# Patient Record
Sex: MALE | Race: WHITE | Employment: FULL TIME | URBAN - METROPOLITAN AREA
[De-identification: names, ages, dates, MRNs, and addresses within clinical notes are randomized per-mention and may not be internally consistent; named-entity substitution may affect disease eponyms.]

---

## 2022-04-14 ENCOUNTER — TELEPHONE (OUTPATIENT)
Dept: CARDIOLOGY | Age: 67
End: 2022-04-14

## 2022-04-26 ENCOUNTER — TELEPHONE (OUTPATIENT)
Dept: CARDIOLOGY | Age: 67
End: 2022-04-26

## 2022-04-26 NOTE — TELEPHONE ENCOUNTER
Spoke with patient and confirmed nuclear stress test appointment on April 28, 2022 at 0800. Instructions for test and COVID-19 preprocedure checklist reviewed with patient, questions answered.

## 2022-04-28 ENCOUNTER — HOSPITAL ENCOUNTER (OUTPATIENT)
Dept: CARDIOLOGY | Age: 67
Discharge: HOME | End: 2022-04-28
Payer: MEDICARE

## 2022-04-28 VITALS
DIASTOLIC BLOOD PRESSURE: 88 MMHG | RESPIRATION RATE: 12 BRPM | HEIGHT: 70 IN | HEART RATE: 74 BPM | SYSTOLIC BLOOD PRESSURE: 148 MMHG | WEIGHT: 200 LBS | BODY MASS INDEX: 28.63 KG/M2

## 2022-04-28 DIAGNOSIS — R06.02 SHORTNESS OF BREATH: Primary | ICD-10-CM

## 2022-04-28 PROCEDURE — 93017 CV STRESS TEST TRACING ONLY: CPT

## 2022-04-28 PROCEDURE — 2580000003 HC RX 258: Performed by: INTERNAL MEDICINE

## 2022-04-28 PROCEDURE — 3430000000 HC RX DIAGNOSTIC RADIOPHARMACEUTICAL: Performed by: INTERNAL MEDICINE

## 2022-04-28 PROCEDURE — 78452 HT MUSCLE IMAGE SPECT MULT: CPT

## 2022-04-28 PROCEDURE — A9500 TC99M SESTAMIBI: HCPCS | Performed by: INTERNAL MEDICINE

## 2022-04-28 PROCEDURE — 6360000002 HC RX W HCPCS: Performed by: FAMILY MEDICINE

## 2022-04-28 RX ORDER — ALBUTEROL SULFATE 90 UG/1
2 AEROSOL, METERED RESPIRATORY (INHALATION) PRN
COMMUNITY
Start: 2022-03-29

## 2022-04-28 RX ORDER — SODIUM CHLORIDE 0.9 % (FLUSH) 0.9 %
10 SYRINGE (ML) INJECTION PRN
Status: DISCONTINUED | OUTPATIENT
Start: 2022-04-28 | End: 2022-04-29 | Stop reason: HOSPADM

## 2022-04-28 RX ADMIN — SODIUM CHLORIDE, PRESERVATIVE FREE 10 ML: 5 INJECTION INTRAVENOUS at 10:22

## 2022-04-28 RX ADMIN — SODIUM CHLORIDE, PRESERVATIVE FREE 10 ML: 5 INJECTION INTRAVENOUS at 08:12

## 2022-04-28 RX ADMIN — REGADENOSON 0.4 MG: 0.08 INJECTION, SOLUTION INTRAVENOUS at 10:22

## 2022-04-28 RX ADMIN — Medication 10.7 MILLICURIE: at 08:11

## 2022-04-28 RX ADMIN — Medication 31.3 MILLICURIE: at 10:22

## 2022-04-28 RX ADMIN — SODIUM CHLORIDE, PRESERVATIVE FREE 10 ML: 5 INJECTION INTRAVENOUS at 10:23

## 2022-04-28 NOTE — PROCEDURES
[address] [facility] [address] [place] [phone]                Pharmacologic Stress Nuclear Gated SPECT Study    Name: Jannie Ron Account Number: [de-identified]    :  1955          Sex: male         Date of Study:  2022    Height: 5' 10\" (177.8 cm)         Weight: 200 lb (90.7 kg)     Yahaira Arguello MD       PCP: Fransisca Baer MD      Cardiologist: None             Interpreting Physician: Malika Archuleta MD  _________________________________________________________________________________    Indication:   Detecting the presence and location of coronary artery disease    Clinical History:   Patient has no known history of coronary artery disease. Resting ECG:    AZ int *** sec, QRS int *** sec, QT int *** sec; HR 83 bpm  {Resting ECG Findings:95099}    Procedure:   Patient unable to achieve target heart rate on treadmill due to dyspnea. Pharmacologic stress testing was performed with regadenoson 0.4 mg for 15 seconds. The heart rate was 83 at baseline and mihaela to 108 beats during the infusion. This corresponds to 70% of maximum predicted heart rate. The blood pressure at baseline was 146/84 and blood pressure at the end of infusion was 128/76. Blood pressure response was normal during the stress procedure. The patient experienced mild shortness of breath and flushed sensation during the infusion. ECG during the infusion {Blank single:11392::\"developed ST segment changes consistent with ischemia\",\"did not change\"}. IMAGING: Myocardial perfusion imaging was performed at rest 30-35 minutes following the intravenous injection of 10.7 mCi of (Tc-Sestamibi) followed by 10 ml of Normal Saline. As per infusion protocol, the patient was injected intravenously with 31.3 mCi of (Tc-Sestamibi) followed by 10 ml of Normal Saline.   Gated post-stress tomographic imaging was performed 20-25 minutes after stress. FINDINGS: The overall quality of the study was {RATIN}. Left ventricular cavity size was noted to be {Blank single:69090::\"enlarged on rest study only\",\"enlarged on stress study only\",\"enlarged on both rest and stress studies\",\"normal\"}. Rotational analog analysis demonstrated {Nuclear Stress Test Findings:69001::\"no patient motion or abnormal extracardiac radioactivity\"}. The gated SPECT stress imaging in the short, vertical long, and horizontal long axis demonstrated normal homogeneous tracer distribution throughout the myocardium. IF TEST NORMAL-HIGHLIGHT AND DELETE FOLLOWING SECTIONS:    A {MILD, MOD, SEV:10135} defect was present in the {Nuclear Stress Test Defect Segments:62929} wall(s) that was  {Blank single:65431::\"small\",\"moderate\",\"large\"} sized by quantification. There also was a {MILD, MOD, SEV:17731} defect present in the {Nuclear Stress Test Defect Segments:25457} wall(s) that was  {Blank single:25611::\"small\",\"moderate\",\"large\"} sized by quantification:     The resting images {Blank single:53065::\"are normal\",\"reveal partial reversibility\",\"reveal complete reversibility\",\"show no change\"}. Gated SPECT left ventricular ejection fraction was calculated to be ***%, with {Myocardial Wall Motion:10445::\"normal myocardial thickening and wall motion\"}. Impression:    1. ECG during the infusion {Blank single:84528::\"developed ST segment changes consistent with ischemia\",\"did not change\"}. 2. The myocardial perfusion imaging was {Myocardial perfusion imagin}. IF TEST NORMAL-HIGHLIGHT AND DELETE FOLLOWING SECTIONS:  The abnormality was a {Myocardial imaging defects:74340}    3. Overall left ventricular systolic function was {Blank single:45187::\"abnormal with regional wall motion abnormalities\",\"abnormal without regional wall motion abnormalities\",\"normal without regional wall motion abnormalities\"}.   4. {Blank single:77239::\"High\",\"Intermediate\",\"Low\"} risk {Blank single:82894::\"pre-operative\",\"post MI\",\"general\"} pharmacologic stress test.    Thank you for sending your patient to this [facility].      {Esignature:[id number]}

## 2022-04-28 NOTE — PROCEDURES
[address] [facility] [address] [phone]                Pharmacologic Stress Nuclear Gated SPECT Study    Name: Jannie Ron Account Number: [de-identified]    :  1955          Sex: male         Date of Study:  2022    Height: 5' 10\" (177.8 cm)         Weight: 200 lb (90.7 kg)     Juan Kuhn MD       PCP: Edu Napier MD      Cardiologist: None             Interpreting Physician: Alley Hyman MD  _________________________________________________________________________________    Indication:   Detecting the presence and location of coronary artery disease    Clinical History:   Patient has no known history of coronary artery disease. Procedure:   Patient unable to achieve target heart rate on treadmill due to dyspnea. Pharmacologic stress testing was performed with regadenoson 0.4 mg for 15 seconds. The heart rate was 83 at baseline and mihaela to 108 beats during the infusion. This corresponds to 70% of maximum predicted heart rate. The blood pressure at baseline was 146/84 and blood pressure at the end of infusion was 128/76. Blood pressure response was normal during the stress procedure. The patient experienced mild shortness of breath and flushed sensation during the infusion. ECG during the infusion did not change. IMAGING: Myocardial perfusion imaging was performed at rest 30-35 minutes following the intravenous injection of 10.7 mCi of (Tc-Sestamibi) followed by 10 ml of Normal Saline. As per infusion protocol, the patient was injected intravenously with 31.3 mCi of (Tc-Sestamibi) followed by 10 ml of Normal Saline. Gated post-stress tomographic imaging was performed 20-25 minutes after stress. FINDINGS: The overall quality of the study was fair.      Left ventricular cavity size was noted to be normal.    Rotational analog analysis demonstrated soft tissue diaphragmatic attenuation and bowel uptake. The gated SPECT stress imaging in the short, vertical long, and horizontal long axis demonstrated normal homogeneous tracer distribution throughout the myocardium, apart from stated artifact. The resting images show no change. Gated SPECT left ventricular ejection fraction was calculated to be 63%, with normal myocardial thickening and wall motion. Impression:    1. ECG during the infusion did not change. 2. The myocardial perfusion imaging was normal with attenuation artifact. 3. Overall left ventricular systolic function was normal without regional wall motion abnormalities. 4. Low risk general pharmacologic nuclear  stress test.    Thank you for sending your patient to this [facility].      Electronically signed by Wil Rainey MD on 4/28/22 at 11:44 AM EDT

## 2022-05-20 ENCOUNTER — HOSPITAL ENCOUNTER (OUTPATIENT)
Dept: GENERAL RADIOLOGY | Age: 67
Discharge: HOME | End: 2022-05-22
Payer: MEDICARE

## 2022-05-20 ENCOUNTER — HOSPITAL ENCOUNTER (OUTPATIENT)
Age: 67
Discharge: HOME | End: 2022-05-22
Payer: MEDICARE

## 2022-05-20 DIAGNOSIS — R06.00 DYSPNEA, UNSPECIFIED TYPE: ICD-10-CM

## 2022-05-20 DIAGNOSIS — R06.02 SHORTNESS OF BREATH: ICD-10-CM

## 2022-05-20 PROCEDURE — 71046 X-RAY EXAM CHEST 2 VIEWS: CPT

## 2022-06-13 ENCOUNTER — HOSPITAL ENCOUNTER (OUTPATIENT)
Dept: HOSPITAL 83 - RESCLI | Age: 67
Discharge: HOME | End: 2022-06-13
Attending: STUDENT IN AN ORGANIZED HEALTH CARE EDUCATION/TRAINING PROGRAM
Payer: MEDICARE

## 2022-06-13 DIAGNOSIS — Z79.899: ICD-10-CM

## 2022-06-13 DIAGNOSIS — J44.9: Primary | ICD-10-CM

## 2023-04-05 ENCOUNTER — HOSPITAL ENCOUNTER (OUTPATIENT)
Dept: HOSPITAL 83 - RESCLI | Age: 68
Discharge: HOME | End: 2023-04-05
Attending: STUDENT IN AN ORGANIZED HEALTH CARE EDUCATION/TRAINING PROGRAM
Payer: MEDICARE

## 2023-04-05 DIAGNOSIS — J44.9: ICD-10-CM

## 2023-04-05 DIAGNOSIS — I10: Primary | ICD-10-CM

## 2023-04-05 DIAGNOSIS — Z87.891: ICD-10-CM

## 2023-04-05 DIAGNOSIS — F10.90: ICD-10-CM

## 2023-04-05 DIAGNOSIS — Z98.890: ICD-10-CM

## 2023-04-05 DIAGNOSIS — Z79.899: ICD-10-CM

## 2024-06-07 ENCOUNTER — HOSPITAL ENCOUNTER (OUTPATIENT)
Dept: HOSPITAL 83 - RESCLI | Age: 69
Discharge: HOME | End: 2024-06-07
Attending: INTERNAL MEDICINE
Payer: MEDICARE

## 2024-06-07 DIAGNOSIS — Z98.890: ICD-10-CM

## 2024-06-07 DIAGNOSIS — Z88.8: ICD-10-CM

## 2024-06-07 DIAGNOSIS — Z79.899: ICD-10-CM

## 2024-06-07 DIAGNOSIS — I10: Primary | ICD-10-CM

## 2024-06-07 DIAGNOSIS — J44.9: ICD-10-CM

## 2025-01-31 ENCOUNTER — APPOINTMENT (OUTPATIENT)
Dept: GENERAL RADIOLOGY | Age: 70
DRG: 193 | End: 2025-01-31
Payer: MEDICARE

## 2025-01-31 ENCOUNTER — HOSPITAL ENCOUNTER (INPATIENT)
Age: 70
LOS: 3 days | Discharge: HOME | DRG: 193 | End: 2025-02-03
Attending: EMERGENCY MEDICINE | Admitting: INTERNAL MEDICINE
Payer: MEDICARE

## 2025-01-31 DIAGNOSIS — J96.01 ACUTE RESPIRATORY FAILURE WITH HYPOXIA: Primary | ICD-10-CM

## 2025-01-31 DIAGNOSIS — J96.01 ACUTE HYPOXIC RESPIRATORY FAILURE: ICD-10-CM

## 2025-01-31 DIAGNOSIS — J44.1 COPD EXACERBATION (HCC): ICD-10-CM

## 2025-01-31 LAB
AADO2: 184.3 MMHG
ALBUMIN SERPL-MCNC: 4 G/DL (ref 3.5–5.2)
ALBUMIN SERPL-MCNC: 4.3 G/DL (ref 3.5–5.2)
ALLEN TEST: POSITIVE
ALP SERPL-CCNC: 119 U/L (ref 40–129)
ALP SERPL-CCNC: 121 U/L (ref 40–129)
ALT SERPL-CCNC: 26 U/L (ref 0–40)
ALT SERPL-CCNC: 28 U/L (ref 0–40)
ANION GAP SERPL CALCULATED.3IONS-SCNC: 15 MMOL/L (ref 7–16)
ANION GAP SERPL CALCULATED.3IONS-SCNC: 15 MMOL/L (ref 7–16)
AST SERPL-CCNC: 50 U/L (ref 0–39)
AST SERPL-CCNC: 54 U/L (ref 0–39)
B PARAP IS1001 DNA NPH QL NAA+NON-PROBE: NOT DETECTED
B PERT DNA SPEC QL NAA+PROBE: NOT DETECTED
B.E.: -1.4 MMOL/L (ref -3–3)
BASOPHILS # BLD: 0.02 K/UL (ref 0–0.2)
BASOPHILS # BLD: 0.05 K/UL (ref 0–0.2)
BASOPHILS NFR BLD: 0 % (ref 0–2)
BASOPHILS NFR BLD: 1 % (ref 0–2)
BILIRUB SERPL-MCNC: 0.3 MG/DL (ref 0–1.2)
BILIRUB SERPL-MCNC: 0.5 MG/DL (ref 0–1.2)
BNP SERPL-MCNC: 589 PG/ML (ref 0–125)
BNP SERPL-MCNC: 712 PG/ML (ref 0–125)
BUN SERPL-MCNC: 17 MG/DL (ref 6–23)
BUN SERPL-MCNC: 18 MG/DL (ref 6–23)
C PNEUM DNA NPH QL NAA+NON-PROBE: NOT DETECTED
CALCIUM SERPL-MCNC: 8.1 MG/DL (ref 8.6–10.2)
CALCIUM SERPL-MCNC: 8.8 MG/DL (ref 8.6–10.2)
CHLORIDE SERPL-SCNC: 94 MMOL/L (ref 98–107)
CHLORIDE SERPL-SCNC: 98 MMOL/L (ref 98–107)
CO2 SERPL-SCNC: 21 MMOL/L (ref 22–29)
CO2 SERPL-SCNC: 27 MMOL/L (ref 22–29)
COHB: 0.3 % (ref 0–1.5)
CREAT SERPL-MCNC: 1 MG/DL (ref 0.7–1.2)
CREAT SERPL-MCNC: 1.1 MG/DL (ref 0.7–1.2)
CRITICAL ACTION: NORMAL
CRITICAL NOTIFICATION DATE/TIME: NORMAL
CRITICAL NOTIFICATION: NORMAL
CRITICAL VALUE READ BACK: YES
CRITICAL: ABNORMAL
DATE ANALYZED: ABNORMAL
DATE OF COLLECTION: ABNORMAL
EOSINOPHIL # BLD: 0 K/UL (ref 0.05–0.5)
EOSINOPHIL # BLD: 0 K/UL (ref 0.05–0.5)
EOSINOPHILS RELATIVE PERCENT: 0 % (ref 0–6)
EOSINOPHILS RELATIVE PERCENT: 0 % (ref 0–6)
ERYTHROCYTE [DISTWIDTH] IN BLOOD BY AUTOMATED COUNT: 12.3 % (ref 11.5–15)
ERYTHROCYTE [DISTWIDTH] IN BLOOD BY AUTOMATED COUNT: 12.4 % (ref 11.5–15)
FIO2: 100 %
FLOW RATE: 4.5
FLUAV H3 RNA NPH QL NAA+NON-PROBE: DETECTED
FLUAV RNA NPH QL NAA+NON-PROBE: DETECTED
FLUBV RNA NPH QL NAA+NON-PROBE: NOT DETECTED
GFR, ESTIMATED: 71 ML/MIN/1.73M2
GFR, ESTIMATED: 82 ML/MIN/1.73M2
GLUCOSE BLD-MCNC: 131 MG/DL (ref 74–99)
GLUCOSE SERPL-MCNC: 122 MG/DL (ref 74–99)
GLUCOSE SERPL-MCNC: 141 MG/DL (ref 74–99)
HADV DNA NPH QL NAA+NON-PROBE: NOT DETECTED
HCO3: 27.3 MMOL/L (ref 22–26)
HCOV 229E RNA NPH QL NAA+NON-PROBE: NOT DETECTED
HCOV HKU1 RNA NPH QL NAA+NON-PROBE: NOT DETECTED
HCOV NL63 RNA NPH QL NAA+NON-PROBE: NOT DETECTED
HCOV OC43 RNA NPH QL NAA+NON-PROBE: NOT DETECTED
HCT VFR BLD AUTO: 51.2 % (ref 37–54)
HCT VFR BLD AUTO: 53 % (ref 37–54)
HGB BLD-MCNC: 16.6 G/DL (ref 12.5–16.5)
HGB BLD-MCNC: 16.7 G/DL (ref 12.5–16.5)
HHB: 0.3 % (ref 0–5)
HMPV RNA NPH QL NAA+NON-PROBE: NOT DETECTED
HPIV1 RNA NPH QL NAA+NON-PROBE: NOT DETECTED
HPIV2 RNA NPH QL NAA+NON-PROBE: NOT DETECTED
HPIV3 RNA NPH QL NAA+NON-PROBE: NOT DETECTED
HPIV4 RNA NPH QL NAA+NON-PROBE: NOT DETECTED
IMM GRANULOCYTES # BLD AUTO: 0.07 K/UL (ref 0–0.58)
IMM GRANULOCYTES # BLD AUTO: 0.07 K/UL (ref 0–0.58)
IMM GRANULOCYTES NFR BLD: 1 % (ref 0–5)
IMM GRANULOCYTES NFR BLD: 1 % (ref 0–5)
LAB: ABNORMAL
LACTATE BLDV-SCNC: 1.1 MMOL/L (ref 0.5–2.2)
LYMPHOCYTES NFR BLD: 0.38 K/UL (ref 1.5–4)
LYMPHOCYTES NFR BLD: 0.67 K/UL (ref 1.5–4)
LYMPHOCYTES RELATIVE PERCENT: 5 % (ref 20–42)
LYMPHOCYTES RELATIVE PERCENT: 7 % (ref 20–42)
Lab: 1923
M PNEUMO DNA NPH QL NAA+NON-PROBE: NOT DETECTED
MAGNESIUM SERPL-MCNC: 2.2 MG/DL (ref 1.6–2.6)
MAGNESIUM SERPL-MCNC: 2.7 MG/DL (ref 1.6–2.6)
MCH RBC QN AUTO: 30.3 PG (ref 26–35)
MCH RBC QN AUTO: 30.5 PG (ref 26–35)
MCHC RBC AUTO-ENTMCNC: 31.5 G/DL (ref 32–34.5)
MCHC RBC AUTO-ENTMCNC: 32.4 G/DL (ref 32–34.5)
MCV RBC AUTO: 93.9 FL (ref 80–99.9)
MCV RBC AUTO: 96 FL (ref 80–99.9)
METHB: 0.8 % (ref 0–1.5)
MODE: ABNORMAL
MONOCYTES NFR BLD: 0.08 K/UL (ref 0.1–0.95)
MONOCYTES NFR BLD: 1 % (ref 2–12)
MONOCYTES NFR BLD: 1.12 K/UL (ref 0.1–0.95)
MONOCYTES NFR BLD: 12 % (ref 2–12)
NEGATIVE BASE EXCESS, ART: 0.2 MMOL/L
NEUTROPHILS NFR BLD: 80 % (ref 43–80)
NEUTROPHILS NFR BLD: 93 % (ref 43–80)
NEUTS SEG NFR BLD: 7.25 K/UL (ref 1.8–7.3)
NEUTS SEG NFR BLD: 7.69 K/UL (ref 1.8–7.3)
O2 DELIVERY DEVICE: ABNORMAL
O2 SATURATION: 99.7 % (ref 92–98.5)
O2HB: 98.6 % (ref 94–97)
OPERATOR ID: 1768
PATIENT TEMP: 37
PATIENT TEMP: 37 C
PCO2: 61.5 MMHG (ref 35–45)
PEEP/CPAP: 8 CMH2O
PFO2: 4.67 MMHG/%
PH BLOOD GAS: 7.26 (ref 7.35–7.45)
PHOSPHATE SERPL-MCNC: 4.3 MG/DL (ref 2.5–4.5)
PLATELET # BLD AUTO: 137 K/UL (ref 130–450)
PLATELET # BLD AUTO: 140 K/UL (ref 130–450)
PMV BLD AUTO: 12.3 FL (ref 7–12)
PMV BLD AUTO: 12.7 FL (ref 7–12)
PO2: 467.2 MMHG (ref 75–100)
POC HCO3: 31.6 MMOL/L (ref 22–26)
POC O2 SATURATION: 96.2 % (ref 92–98.5)
POC PCO2 TEMP: 80.7 MM HG
POC PCO2: 80.7 MM HG (ref 35–45)
POC PH TEMP: 7.2
POC PH: 7.2 (ref 7.35–7.45)
POC PO2 TEMP: 106.1 MM HG
POC PO2: 106.1 MM HG (ref 60–80)
POTASSIUM SERPL-SCNC: 5.2 MMOL/L (ref 3.5–5)
POTASSIUM SERPL-SCNC: 5.2 MMOL/L (ref 3.5–5)
PROT SERPL-MCNC: 7.2 G/DL (ref 6.4–8.3)
PROT SERPL-MCNC: 7.3 G/DL (ref 6.4–8.3)
RBC # BLD AUTO: 5.45 M/UL (ref 3.8–5.8)
RBC # BLD AUTO: 5.52 M/UL (ref 3.8–5.8)
RBC # BLD: ABNORMAL 10*6/UL
RI(T): 0.39
RR MECHANICAL: 22 B/MIN
RSV RNA NPH QL NAA+NON-PROBE: NOT DETECTED
RV+EV RNA NPH QL NAA+NON-PROBE: NOT DETECTED
SAMPLE SITE: ABNORMAL
SARS-COV-2 RNA NPH QL NAA+NON-PROBE: NOT DETECTED
SODIUM SERPL-SCNC: 134 MMOL/L (ref 132–146)
SODIUM SERPL-SCNC: 136 MMOL/L (ref 132–146)
SOURCE, BLOOD GAS: ABNORMAL
SPECIMEN DESCRIPTION: ABNORMAL
THB: 17 G/DL (ref 11.5–16.5)
TIME ANALYZED: 1942
TROPONIN I SERPL HS-MCNC: 19 NG/L (ref 0–11)
TROPONIN I SERPL HS-MCNC: 27 NG/L (ref 0–11)
TROPONIN I SERPL HS-MCNC: 28 NG/L (ref 0–11)
VT MECHANICAL: 400 ML
WBC OTHER # BLD: 7.8 K/UL (ref 4.5–11.5)
WBC OTHER # BLD: 9.6 K/UL (ref 4.5–11.5)

## 2025-01-31 PROCEDURE — 82805 BLOOD GASES W/O2 SATURATION: CPT

## 2025-01-31 PROCEDURE — 6370000000 HC RX 637 (ALT 250 FOR IP)

## 2025-01-31 PROCEDURE — 83605 ASSAY OF LACTIC ACID: CPT

## 2025-01-31 PROCEDURE — 84484 ASSAY OF TROPONIN QUANT: CPT

## 2025-01-31 PROCEDURE — 83880 ASSAY OF NATRIURETIC PEPTIDE: CPT

## 2025-01-31 PROCEDURE — 99291 CRITICAL CARE FIRST HOUR: CPT

## 2025-01-31 PROCEDURE — 71045 X-RAY EXAM CHEST 1 VIEW: CPT

## 2025-01-31 PROCEDURE — 5A09357 ASSISTANCE WITH RESPIRATORY VENTILATION, LESS THAN 24 CONSECUTIVE HOURS, CONTINUOUS POSITIVE AIRWAY PRESSURE: ICD-10-PCS | Performed by: INTERNAL MEDICINE

## 2025-01-31 PROCEDURE — 82803 BLOOD GASES ANY COMBINATION: CPT

## 2025-01-31 PROCEDURE — 6360000002 HC RX W HCPCS

## 2025-01-31 PROCEDURE — 6360000002 HC RX W HCPCS: Performed by: INTERNAL MEDICINE

## 2025-01-31 PROCEDURE — 2000000000 HC ICU R&B

## 2025-01-31 PROCEDURE — 85025 COMPLETE CBC W/AUTO DIFF WBC: CPT

## 2025-01-31 PROCEDURE — 6360000002 HC RX W HCPCS: Performed by: NURSE PRACTITIONER

## 2025-01-31 PROCEDURE — 2500000003 HC RX 250 WO HCPCS

## 2025-01-31 PROCEDURE — 80053 COMPREHEN METABOLIC PANEL: CPT

## 2025-01-31 PROCEDURE — 96365 THER/PROPH/DIAG IV INF INIT: CPT

## 2025-01-31 PROCEDURE — 83735 ASSAY OF MAGNESIUM: CPT

## 2025-01-31 PROCEDURE — 2580000003 HC RX 258

## 2025-01-31 PROCEDURE — 2500000003 HC RX 250 WO HCPCS: Performed by: NURSE PRACTITIONER

## 2025-01-31 PROCEDURE — 99291 CRITICAL CARE FIRST HOUR: CPT | Performed by: INTERNAL MEDICINE

## 2025-01-31 PROCEDURE — 6370000000 HC RX 637 (ALT 250 FOR IP): Performed by: NURSE PRACTITIONER

## 2025-01-31 PROCEDURE — 84100 ASSAY OF PHOSPHORUS: CPT

## 2025-01-31 PROCEDURE — 82962 GLUCOSE BLOOD TEST: CPT

## 2025-01-31 PROCEDURE — 93005 ELECTROCARDIOGRAM TRACING: CPT

## 2025-01-31 PROCEDURE — 94660 CPAP INITIATION&MGMT: CPT

## 2025-01-31 PROCEDURE — 94640 AIRWAY INHALATION TREATMENT: CPT

## 2025-01-31 PROCEDURE — 0202U NFCT DS 22 TRGT SARS-COV-2: CPT

## 2025-01-31 RX ORDER — ARFORMOTEROL TARTRATE 15 UG/2ML
15 SOLUTION RESPIRATORY (INHALATION)
Status: DISCONTINUED | OUTPATIENT
Start: 2025-01-31 | End: 2025-02-03 | Stop reason: HOSPADM

## 2025-01-31 RX ORDER — ACETAMINOPHEN 650 MG/1
650 SUPPOSITORY RECTAL EVERY 6 HOURS PRN
Status: DISCONTINUED | OUTPATIENT
Start: 2025-01-31 | End: 2025-02-03 | Stop reason: HOSPADM

## 2025-01-31 RX ORDER — MAGNESIUM SULFATE IN WATER 40 MG/ML
2000 INJECTION, SOLUTION INTRAVENOUS ONCE
Status: COMPLETED | OUTPATIENT
Start: 2025-01-31 | End: 2025-01-31

## 2025-01-31 RX ORDER — ACETAMINOPHEN 325 MG/1
650 TABLET ORAL EVERY 6 HOURS PRN
Status: DISCONTINUED | OUTPATIENT
Start: 2025-01-31 | End: 2025-02-03 | Stop reason: HOSPADM

## 2025-01-31 RX ORDER — METHYLPREDNISOLONE SODIUM SUCCINATE 125 MG/2ML
INJECTION INTRAMUSCULAR; INTRAVENOUS
Status: DISPENSED
Start: 2025-01-31 | End: 2025-02-01

## 2025-01-31 RX ORDER — ONDANSETRON 4 MG/1
4 TABLET, ORALLY DISINTEGRATING ORAL EVERY 8 HOURS PRN
Status: DISCONTINUED | OUTPATIENT
Start: 2025-01-31 | End: 2025-02-03 | Stop reason: HOSPADM

## 2025-01-31 RX ORDER — ENOXAPARIN SODIUM 100 MG/ML
40 INJECTION SUBCUTANEOUS DAILY
Status: DISCONTINUED | OUTPATIENT
Start: 2025-01-31 | End: 2025-02-03 | Stop reason: HOSPADM

## 2025-01-31 RX ORDER — SODIUM CHLORIDE 9 MG/ML
INJECTION, SOLUTION INTRAVENOUS PRN
Status: DISCONTINUED | OUTPATIENT
Start: 2025-01-31 | End: 2025-02-03 | Stop reason: HOSPADM

## 2025-01-31 RX ORDER — PREDNISONE 20 MG/1
20 TABLET ORAL 2 TIMES DAILY
Status: DISCONTINUED | OUTPATIENT
Start: 2025-02-02 | End: 2025-02-03 | Stop reason: HOSPADM

## 2025-01-31 RX ORDER — BUDESONIDE 0.5 MG/2ML
0.5 INHALANT ORAL
Status: DISCONTINUED | OUTPATIENT
Start: 2025-01-31 | End: 2025-02-03 | Stop reason: HOSPADM

## 2025-01-31 RX ORDER — SODIUM CHLORIDE 9 MG/ML
INJECTION, SOLUTION INTRAVENOUS CONTINUOUS
Status: ACTIVE | OUTPATIENT
Start: 2025-01-31 | End: 2025-02-01

## 2025-01-31 RX ORDER — OSELTAMIVIR PHOSPHATE 75 MG/1
75 CAPSULE ORAL 2 TIMES DAILY
Status: DISCONTINUED | OUTPATIENT
Start: 2025-02-03 | End: 2025-02-03 | Stop reason: HOSPADM

## 2025-01-31 RX ORDER — GUAIFENESIN 400 MG/1
400 TABLET ORAL 3 TIMES DAILY
Status: DISCONTINUED | OUTPATIENT
Start: 2025-01-31 | End: 2025-02-03 | Stop reason: HOSPADM

## 2025-01-31 RX ORDER — ACETAMINOPHEN 500 MG
1000 TABLET ORAL ONCE
Status: COMPLETED | OUTPATIENT
Start: 2025-01-31 | End: 2025-01-31

## 2025-01-31 RX ORDER — DIPHENHYDRAMINE HYDROCHLORIDE 50 MG/ML
25 INJECTION INTRAMUSCULAR; INTRAVENOUS ONCE
Status: COMPLETED | OUTPATIENT
Start: 2025-01-31 | End: 2025-01-31

## 2025-01-31 RX ORDER — ONDANSETRON 2 MG/ML
4 INJECTION INTRAMUSCULAR; INTRAVENOUS EVERY 6 HOURS PRN
Status: DISCONTINUED | OUTPATIENT
Start: 2025-01-31 | End: 2025-02-03 | Stop reason: HOSPADM

## 2025-01-31 RX ORDER — IPRATROPIUM BROMIDE AND ALBUTEROL SULFATE 2.5; .5 MG/3ML; MG/3ML
1 SOLUTION RESPIRATORY (INHALATION)
Status: DISCONTINUED | OUTPATIENT
Start: 2025-01-31 | End: 2025-02-03 | Stop reason: HOSPADM

## 2025-01-31 RX ORDER — HYDRALAZINE HYDROCHLORIDE 20 MG/ML
10 INJECTION INTRAMUSCULAR; INTRAVENOUS ONCE
Status: DISCONTINUED | OUTPATIENT
Start: 2025-01-31 | End: 2025-02-03 | Stop reason: HOSPADM

## 2025-01-31 RX ORDER — IPRATROPIUM BROMIDE AND ALBUTEROL SULFATE 2.5; .5 MG/3ML; MG/3ML
1 SOLUTION RESPIRATORY (INHALATION)
Status: DISCONTINUED | OUTPATIENT
Start: 2025-01-31 | End: 2025-01-31

## 2025-01-31 RX ORDER — BUDESONIDE AND FORMOTEROL FUMARATE DIHYDRATE 160; 4.5 UG/1; UG/1
2 AEROSOL RESPIRATORY (INHALATION) 2 TIMES DAILY
COMMUNITY

## 2025-01-31 RX ORDER — SODIUM CHLORIDE 0.9 % (FLUSH) 0.9 %
5-40 SYRINGE (ML) INJECTION PRN
Status: DISCONTINUED | OUTPATIENT
Start: 2025-01-31 | End: 2025-02-03 | Stop reason: HOSPADM

## 2025-01-31 RX ORDER — HYDRALAZINE HYDROCHLORIDE 20 MG/ML
INJECTION INTRAMUSCULAR; INTRAVENOUS
Status: COMPLETED
Start: 2025-01-31 | End: 2025-01-31

## 2025-01-31 RX ORDER — DEXAMETHASONE SODIUM PHOSPHATE 4 MG/ML
10 INJECTION, SOLUTION INTRA-ARTICULAR; INTRALESIONAL; INTRAMUSCULAR; INTRAVENOUS; SOFT TISSUE ONCE
Status: DISCONTINUED | OUTPATIENT
Start: 2025-01-31 | End: 2025-02-03 | Stop reason: HOSPADM

## 2025-01-31 RX ORDER — SODIUM CHLORIDE 0.9 % (FLUSH) 0.9 %
5-40 SYRINGE (ML) INJECTION EVERY 12 HOURS SCHEDULED
Status: DISCONTINUED | OUTPATIENT
Start: 2025-01-31 | End: 2025-02-03 | Stop reason: HOSPADM

## 2025-01-31 RX ORDER — 0.9 % SODIUM CHLORIDE 0.9 %
1000 INTRAVENOUS SOLUTION INTRAVENOUS ONCE
Status: COMPLETED | OUTPATIENT
Start: 2025-01-31 | End: 2025-01-31

## 2025-01-31 RX ORDER — IPRATROPIUM BROMIDE AND ALBUTEROL SULFATE 2.5; .5 MG/3ML; MG/3ML
3 SOLUTION RESPIRATORY (INHALATION) ONCE
Status: COMPLETED | OUTPATIENT
Start: 2025-01-31 | End: 2025-01-31

## 2025-01-31 RX ORDER — TIOTROPIUM BROMIDE 18 UG/1
18 CAPSULE ORAL; RESPIRATORY (INHALATION) DAILY
COMMUNITY

## 2025-01-31 RX ORDER — ALBUTEROL SULFATE 0.83 MG/ML
2.5 SOLUTION RESPIRATORY (INHALATION)
Status: DISCONTINUED | OUTPATIENT
Start: 2025-01-31 | End: 2025-01-31

## 2025-01-31 RX ORDER — HYDRALAZINE HYDROCHLORIDE 20 MG/ML
10 INJECTION INTRAMUSCULAR; INTRAVENOUS EVERY 6 HOURS PRN
Status: DISCONTINUED | OUTPATIENT
Start: 2025-01-31 | End: 2025-02-03 | Stop reason: HOSPADM

## 2025-01-31 RX ADMIN — HYDRALAZINE HYDROCHLORIDE 10 MG: 20 INJECTION INTRAMUSCULAR; INTRAVENOUS at 15:35

## 2025-01-31 RX ADMIN — SODIUM CHLORIDE 1000 ML: 9 INJECTION, SOLUTION INTRAVENOUS at 11:35

## 2025-01-31 RX ADMIN — IPRATROPIUM BROMIDE AND ALBUTEROL SULFATE 1 DOSE: 2.5; .5 SOLUTION RESPIRATORY (INHALATION) at 21:37

## 2025-01-31 RX ADMIN — BUDESONIDE INHALATION 500 MCG: 0.5 SUSPENSION RESPIRATORY (INHALATION) at 21:37

## 2025-01-31 RX ADMIN — HYDRALAZINE HYDROCHLORIDE: 20 INJECTION INTRAMUSCULAR; INTRAVENOUS at 11:14

## 2025-01-31 RX ADMIN — ARFORMOTEROL TARTRATE 15 MCG: 15 SOLUTION RESPIRATORY (INHALATION) at 21:36

## 2025-01-31 RX ADMIN — WATER 1000 MG: 1 INJECTION INTRAMUSCULAR; INTRAVENOUS; SUBCUTANEOUS at 13:45

## 2025-01-31 RX ADMIN — MAGNESIUM SULFATE HEPTAHYDRATE 2000 MG: 40 INJECTION, SOLUTION INTRAVENOUS at 08:25

## 2025-01-31 RX ADMIN — IPRATROPIUM BROMIDE AND ALBUTEROL SULFATE 3 DOSE: 2.5; .5 SOLUTION RESPIRATORY (INHALATION) at 08:26

## 2025-01-31 RX ADMIN — SODIUM CHLORIDE, PRESERVATIVE FREE 10 ML: 5 INJECTION INTRAVENOUS at 21:37

## 2025-01-31 RX ADMIN — DIPHENHYDRAMINE HYDROCHLORIDE 25 MG: 50 INJECTION INTRAMUSCULAR; INTRAVENOUS at 15:36

## 2025-01-31 RX ADMIN — WATER 40 MG: 1 INJECTION INTRAMUSCULAR; INTRAVENOUS; SUBCUTANEOUS at 13:43

## 2025-01-31 RX ADMIN — METHYLPREDNISOLONE SODIUM SUCCINATE 125 MG: 125 INJECTION, POWDER, LYOPHILIZED, FOR SOLUTION INTRAMUSCULAR; INTRAVENOUS at 14:24

## 2025-01-31 RX ADMIN — ACETAMINOPHEN 1000 MG: 500 TABLET, FILM COATED ORAL at 11:34

## 2025-01-31 RX ADMIN — GUAIFENESIN 400 MG: 400 TABLET ORAL at 21:35

## 2025-01-31 ASSESSMENT — LIFESTYLE VARIABLES
HOW MANY STANDARD DRINKS CONTAINING ALCOHOL DO YOU HAVE ON A TYPICAL DAY: PATIENT DOES NOT DRINK
HOW OFTEN DO YOU HAVE A DRINK CONTAINING ALCOHOL: NEVER

## 2025-01-31 ASSESSMENT — PAIN - FUNCTIONAL ASSESSMENT: PAIN_FUNCTIONAL_ASSESSMENT: NONE - DENIES PAIN

## 2025-01-31 ASSESSMENT — PAIN SCALES - GENERAL: PAINLEVEL_OUTOF10: 0

## 2025-01-31 NOTE — ED PROVIDER NOTES
[facility] EMERGENCY DEPARTMENT  EMERGENCY DEPARTMENT ENCOUNTER        Pt Name: Frank Niño  MRN: [id number]  Birthdate 1955  Date of evaluation: 1/31/2025  Provider: Bob Bess MD  PCP: Royce Dowell MD  Note Started: 7:52 AM EST 1/31/25    CHIEF COMPLAINT       Chief Complaint   Patient presents with    Shortness of Breath     X1 week with cough, 85% on RA now 96% after duoneb on 6L       HISTORY OF PRESENT ILLNESS: 1 or more Elements   History From: Patient    Limitations to history : None  Social Determinants : None    Frank Niño is a 69 y.o. male past medical history of COPD who presents to the emergency department with complaints of worsening shortness of breath.  Patient stated symptoms started on Monday have been getting worse.  Patient has a history of COPD.  Patient states that nothing seems to be making symptoms better or worse.  Patient was found to be hypoxic on room air.  EMS gave patient 1 DuoNeb, and Solu-Medrol.  Patient denies any chest pain, fever, chills.  Patient states that he does not follow-up with any pulmonologist.    Nursing Notes were all reviewed and agreed with or any disagreements were addressed in the HPI.    ROS:   Pertinent positives and negatives are stated within HPI, all other systems reviewed and are negative.      --------------------------------------------- PAST HISTORY ---------------------------------------------  Past Medical History:  has no past medical history on file.    Past Surgical History:  has no past surgical history on file.    Social History:  reports that he quit smoking about 12 years ago. His smoking use included cigarettes. He quit smokeless tobacco use about 4 years ago.  His smokeless tobacco use included snuff. He reports current alcohol use of about 6.0 standard drinks of alcohol per week.    Family History: He was adopted. Family history is unknown by patient.     The  PORTABLE   Final Result   Heart size is within normal limits. Lungs are clear. No pleural effusion.           No results found.    No results found.    Procedures:      ------------------------- NURSING NOTES AND VITALS REVIEWED ---------------------------   The nursing notes within the ED encounter and vital signs as below have been reviewed by myself and ED attending.  /86   Pulse (!) 128   Temp 99.1 °F (37.3 °C)   Resp 26   Wt 90.7 kg (200 lb)   SpO2 98%   BMI 28.70 kg/m²   Oxygen Saturation Interpretation: Normal    The patient’s available past medical records and past encounters were reviewed by myself and ED attending        ------------------------------ ED COURSE/MEDICAL DECISION MAKING----------------------    Medical Decision Making/Differential Diagnosis:    CC/HPI Summary, Pertinent Physical Exam Findings, Social Determinants of health, Records Reviewed, DDx, testing done/not done, ED Course, Reassessment, disposition considerations/shared decision making with patient, consults, disposition:      Medical Decision Making/ED COURSE:    Chronic Conditions affecting care:    has no past medical history on file.       Myself and ED attending interpreted findings during patient's stay.   Vital signs /86   Pulse (!) 128   Temp 99.1 °F (37.3 °C)   Resp 26   Wt 90.7 kg (200 lb)   SpO2 98%   BMI 28.70 kg/m²     Frank Niño is a 69 y.o. male past medical history of COPD who presents to the emergency department with complaints of worsening shortness of breath.  Patient stated symptoms started on Monday have been getting worse.While in the ED patient was hemodynamically stable, afebrile, nontoxic-appearing, in no respiratory distress. Physical exam remarkable for diminished breath sounds at the bases of the lungs, tachypnea, scattered wheezing.  Patient's was found to be tachycardic.  Rest of physical exam was unremarkable.  Respiratory panel positive for influenza A, CMP with no

## 2025-01-31 NOTE — PROGRESS NOTES
4 Eyes Skin Assessment     NAME:  Frank Niño  YOB: 1955  MEDICAL RECORD NUMBER:  [id number]    The patient is being assessed for  Admission    I agree that at least one RN has performed a thorough Head to Toe Skin Assessment on the patient. ALL assessment sites listed below have been assessed.      Areas assessed by both nurses:    Head, Face, Ears, Shoulders, Back, Chest, Arms, Elbows, Hands, Sacrum. Buttock, Coccyx, Ischium, Legs. Feet and Heels, and Under Medical Devices         Does the Patient have a Wound? No noted wound(s)       Freeman Prevention initiated by RN: Yes  Wound Care Orders initiated by RN: No    Pressure Injury (Stage 3,4, Unstageable, DTI, NWPT, and Complex wounds) if present, place Wound referral order by RN under : No    New Ostomies, if present place, Ostomy referral order under : No     Nurse 1 eSignature: Electronically signed by MAX SMITH RN on 1/31/25 at 4:32 PM EST    **SHARE this note so that the co-signing nurse can place an eSignature**    Nurse 2 eSignature: Electronically signed by Denver Gutierrez on 1/31/25 at 4:49 PM EST

## 2025-01-31 NOTE — MANAGEMENT PLAN
Responded to Stroke alert and spoke with responding residents at bedside. Patient having respiratory issues and not stroke symptoms, resident canceled stroke alert.     Velma Guthrie, RN, BSN, Stroke Navigator

## 2025-01-31 NOTE — SIGNIFICANT EVENT
Critical Care - Rapid Response Team Note      Date of event: 1/31/2025   Time of event: 02:06 PM     Frank Niño 69 y.o. year old male   YOB: 1955     PCP:  Royce Dowell MD   Location: [facility]   Witnessed? : []Yes  [] No  Initial Code status: [] Full  [] DNR-CCA  []DNR-CC    []Limited  ______________________________________________________________________  Reason for RRT:   Other: Stroke alert and respiratory distress    Chief Complaint for this admission:   Chief Complaint   Patient presents with    Shortness of Breath     X1 week with cough, 85% on RA now 96% after duoneb on 6L       Admit date:  1/31/2025     Admitting Diagnosis: COPD exacerbation (HCC) [J44.1]  Acute respiratory failure with hypoxia [J96.01]  Acute hypoxic respiratory failure [J96.01]      Current Diagnosis: The primary encounter diagnosis was Acute respiratory failure with hypoxia. A diagnosis of COPD exacerbation (HCC) was also pertinent to this visit.     Subjective: Frank Niño is a 68 y/o man w/ PMH of COPD who was admitted to the hospital for COPD exacerbation. He was being managed for his COPD exacerbation and he is hospital day #1. RRT was called today for concern of stroke. On arrival the patient was nonverbal, but he was able to follow commands, no neuro deficit though. However, he was seen in respiratory distress and that was a new change for him. Given the acute concern and risk for decompensation the patient was transferred to MICU for further management.     Objective:   Initial Assessment on arrival:  Vital signs: Temp: 100.1, BP: 230/110, RR: 35, HR: 124 SpO2:95% on 5L    Airway and Condition: Pulse present and Stridor noted    Lungs And Circulation: decreased breath sounds on bilateral lung fields  noted                  Neurologic: responds to pain and follows commands, no deficits, nonverbal initially, alert, awake, oriented        Initial Interventions: Labs ordered: CBC, CMP, lactic,

## 2025-01-31 NOTE — CONSULTS
MICU Consult       Frank Niño  :  1955(69 y.o.)  MRN:  [id number]    Date: 2025   Attending:      Subjective:      Chief Complaint: Shortness of breath    HPI: Patient initially presented to the emergency room due to complaints of worsening shortness of breath.  He does have a known history of COPD.  Apparently today after receiving a dose of Rocephin he became flushed in the face tachycardic had worsening shortness of breath and stridor.  An RRT was called and he was given a dose of Solu-Medrol, Pepcid, and racemic epinephrine.  He was also placed on the BiPAP after an ABG showed respiratory acidosis.    No past medical history on file.    No past surgical history on file.     Family History   Adopted: Yes   Family history unknown: Yes       Social History     Socioeconomic History    Marital status:      Spouse name: Not on file    Number of children: Not on file    Years of education: Not on file    Highest education level: Not on file   Occupational History    Not on file   Tobacco Use    Smoking status: Former     Current packs/day: 0.00     Types: Cigarettes     Quit date: 2012     Years since quittin.7    Smokeless tobacco: Former     Types: Snuff     Quit date: 2020   Vaping Use    Vaping status: Never Used   Substance and Sexual Activity    Alcohol use: Yes     Alcohol/week: 6.0 standard drinks of alcohol     Types: 6 Cans of beer per week     Comment: only during golf season    Drug use: Not on file    Sexual activity: Not on file   Other Topics Concern    Not on file   Social History Narrative    Not on file     Social Determinants of Health     Financial Resource Strain: Not on file   Food Insecurity: No Food Insecurity (2025)    Hunger Vital Sign     Worried About Running Out of Food in the Last Year: Never true     Ran Out of Food in the Last Year: Never true   Transportation Needs: No Transportation Needs (2025)    PRAPARE - Transportation

## 2025-01-31 NOTE — PROGRESS NOTES
4 Eyes Skin Assessment     NAME:  Frank Niño  YOB: 1955  MEDICAL RECORD NUMBER:  [id number]    The patient is being assessed for  Admission    I agree that at least one RN has performed a thorough Head to Toe Skin Assessment on the patient. ALL assessment sites listed below have been assessed.      Areas assessed by both nurses:    Head, Face, Ears, Shoulders, Back, Chest, Arms, Elbows, Hands, Sacrum. Buttock, Coccyx, Ischium, Legs. Feet and Heels, and Under Medical Devices         Does the Patient have a Wound? No noted wound(s)       Freeman Prevention initiated by RN: No  Wound Care Orders initiated by RN: No    Pressure Injury (Stage 3,4, Unstageable, DTI, NWPT, and Complex wounds) if present, place Wound referral order by RN under : No    New Ostomies, if present place, Ostomy referral order under : No     Nurse 1 eSignature: Electronically signed by Freya Alexandra RN on 1/31/25 at 1:04 PM EST    **SHARE this note so that the co-signing nurse can place an eSignature**    Nurse 2 eSignature: Electronically signed by Emerald Hawkins RN on 1/31/25 at 1:06 PM EST

## 2025-01-31 NOTE — PLAN OF CARE
Problem: Skin/Tissue Integrity  Goal: Skin integrity remains intact  Description: 1.  Monitor for areas of redness and/or skin breakdown  2.  Assess vascular access sites hourly  3.  Every 4-6 hours minimum:  Change oxygen saturation probe site  4.  Every 4-6 hours:  If on nasal continuous positive airway pressure, respiratory therapy assess nares and determine need for appliance change or resting period  Outcome: Progressing  Flowsheets (Taken 1/31/2025 1621)  Skin Integrity Remains Intact: Monitor for areas of redness and/or skin breakdown     Problem: Respiratory - Adult  Goal: Achieves optimal ventilation and oxygenation  Outcome: Progressing  Flowsheets (Taken 1/31/2025 1621)  Achieves optimal ventilation and oxygenation:   Assess for changes in respiratory status   Assess for changes in mentation and behavior   Position to facilitate oxygenation and minimize respiratory effort   Oxygen supplementation based on oxygen saturation or arterial blood gases   Assess and instruct to report shortness of breath or any respiratory difficulty   Respiratory therapy support as indicated     Problem: Skin/Tissue Integrity - Adult  Goal: Skin integrity remains intact  Description: 1.  Monitor for areas of redness and/or skin breakdown  2.  Assess vascular access sites hourly  3.  Every 4-6 hours minimum:  Change oxygen saturation probe site  4.  Every 4-6 hours:  If on nasal continuous positive airway pressure, respiratory therapy assess nares and determine need for appliance change or resting period  Outcome: Progressing  Flowsheets (Taken 1/31/2025 1621)  Skin Integrity Remains Intact: Monitor for areas of redness and/or skin breakdown     Problem: Pain  Goal: Verbalizes/displays adequate comfort level or baseline comfort level  Outcome: Progressing  Flowsheets (Taken 1/31/2025 1621)  Verbalizes/displays adequate comfort level or baseline comfort level:   Encourage patient to monitor pain and request assistance

## 2025-01-31 NOTE — PROGRESS NOTES
Patient admitted to MICU with the following belongings:  chapstick, keys, undergarments, Wallet, Pants, Socks, and Shoes. ALL, were sent home with the patient's family.

## 2025-01-31 NOTE — ED PROVIDER NOTES
ATTENDING PROVIDER ATTESTATION:     Frank Niño presented to the emergency department for evaluation of Shortness of Breath (X1 week with cough, 85% on RA now 96% after duoneb on 6L)   and was initially evaluated by the Medical Resident. See Original ED Note for H&P and ED course above.     I have reviewed and discussed the case, including pertinent history (medical, surgical, family and social) and exam findings with the Medical Resident assigned to Frank Niño.  I have personally performed and/or participated in the history, exam, medical decision making, and procedures and agree with all pertinent clinical information and any additional changes or corrections are noted below in my assessment and plan. I have discussed this patient in detail with the resident, and provided the instruction and education,       I have reviewed my findings and recommendations with the assigned Medical Resident, Frank Niño and members of family present at the time of disposition.      I have performed a history and physical examination of this patient and directly supervised the resident caring for this patient              [facility]  EMERGENCY DEPARTMENT ENCOUNTER        Pt Name: Frank Niño  MRN: [id number]  Birthdate 1955  Date of evaluation: 1/31/2025  Provider: Sascha Clarke MD  PCP: Royce Dowell MD  Note Started: 7:58 AM EST 1/31/25    CHIEF COMPLAINT       Chief Complaint   Patient presents with    Shortness of Breath     X1 week with cough, 85% on RA now 96% after duoneb on 6L       HISTORY OF PRESENT ILLNESS: 1 or more Elements       Frank Niño is a 69 y.o. male who presents for breath.  Patient reports shortness of breath with cough for the last week.  He reports that his cough has been slightly productive.  EMS was called as he was worse today.  He arrives in respite distress.  He was 85% on room air.  After DuoNeb treatments and 6 L his oxygen saturation is 96%.  He was still with  tachycardia  Comparison to Old EKG Changes from prior EKG        Risk  OTC drugs.  Prescription drug management.  Decision regarding hospitalization.                  CONSULTS:   IP CONSULT TO INTERNAL MEDICINE  IP CONSULT TO SOCIAL WORK            PROCEDURES   Unless otherwise noted below, none      CRITICAL CARE TIME (.cct)   CRITICAL CARE:  Please note that the withdrawal or failure to initiate urgent interventions for this patient would likely result in a life threatening deterioration or permanent disability.      Accordingly this patient received 30 minutes of critical care time, including coordination of care, and direct bedside care and excluding separately billable procedures.        Sascha SALDANA MD, am the primary provider of record      FINAL IMPRESSION      1. Acute respiratory failure with hypoxia    2. COPD exacerbation (HCC)          DISPOSITION/PLAN     DISPOSITION Admitted 01/31/2025 10:07:54 AM      PATIENT REFERRED TO:  No follow-up provider specified.    DISCHARGE MEDICATIONS:  Current Discharge Medication List          DISCONTINUED MEDICATIONS:  Current Discharge Medication List                 (Please note that portions of this note were completed with a voice recognition program.  Efforts were made to edit the dictations but occasionally words are mis-transcribed.)    Sascha Clarke MD (electronically signed)            Sascha Clarke MD  01/31/25 1084

## 2025-02-01 LAB
AADO2: 341 MMHG
ALBUMIN SERPL-MCNC: 3.8 G/DL (ref 3.5–5.2)
ALP SERPL-CCNC: 103 U/L (ref 40–129)
ALT SERPL-CCNC: 24 U/L (ref 0–40)
ANION GAP SERPL CALCULATED.3IONS-SCNC: 12 MMOL/L (ref 7–16)
AST SERPL-CCNC: 49 U/L (ref 0–39)
B.E.: -0.2 MMOL/L (ref -3–3)
BASOPHILS # BLD: 0 K/UL (ref 0–0.2)
BASOPHILS NFR BLD: 0 % (ref 0–2)
BILIRUB SERPL-MCNC: 0.3 MG/DL (ref 0–1.2)
BUN SERPL-MCNC: 25 MG/DL (ref 6–23)
CALCIUM SERPL-MCNC: 8.3 MG/DL (ref 8.6–10.2)
CHLORIDE SERPL-SCNC: 95 MMOL/L (ref 98–107)
CO2 SERPL-SCNC: 26 MMOL/L (ref 22–29)
COHB: 0.5 % (ref 0–1.5)
CREAT SERPL-MCNC: 0.9 MG/DL (ref 0.7–1.2)
CRITICAL: ABNORMAL
DATE ANALYZED: ABNORMAL
DATE OF COLLECTION: ABNORMAL
EOSINOPHIL # BLD: 0 K/UL (ref 0.05–0.5)
EOSINOPHILS RELATIVE PERCENT: 0 % (ref 0–6)
ERYTHROCYTE [DISTWIDTH] IN BLOOD BY AUTOMATED COUNT: 12.6 % (ref 11.5–15)
FIO2: 100 %
GFR, ESTIMATED: 88 ML/MIN/1.73M2
GLUCOSE SERPL-MCNC: 147 MG/DL (ref 74–99)
HCO3: 27.7 MMOL/L (ref 22–26)
HCT VFR BLD AUTO: 50.5 % (ref 37–54)
HGB BLD-MCNC: 16.2 G/DL (ref 12.5–16.5)
HHB: 0.2 % (ref 0–5)
IMM GRANULOCYTES # BLD AUTO: 0.03 K/UL (ref 0–0.58)
IMM GRANULOCYTES NFR BLD: 0 % (ref 0–5)
LAB: ABNORMAL
LYMPHOCYTES NFR BLD: 0.42 K/UL (ref 1.5–4)
LYMPHOCYTES RELATIVE PERCENT: 6 % (ref 20–42)
Lab: 429
MCH RBC QN AUTO: 30.7 PG (ref 26–35)
MCHC RBC AUTO-ENTMCNC: 32.1 G/DL (ref 32–34.5)
MCV RBC AUTO: 95.8 FL (ref 80–99.9)
METHB: 0.5 % (ref 0–1.5)
MODE: ABNORMAL
MONOCYTES NFR BLD: 0.59 K/UL (ref 0.1–0.95)
MONOCYTES NFR BLD: 8 % (ref 2–12)
NEUTROPHILS NFR BLD: 85 % (ref 43–80)
NEUTS SEG NFR BLD: 5.97 K/UL (ref 1.8–7.3)
O2 SATURATION: 99.8 % (ref 92–98.5)
O2HB: 98.8 % (ref 94–97)
OPERATOR ID: 7221
PATIENT TEMP: 37 C
PCO2: 57.4 MMHG (ref 35–45)
PEEP/CPAP: 10 CMH2O
PFO2: 3.15 MMHG/%
PH BLOOD GAS: 7.3 (ref 7.35–7.45)
PLATELET # BLD AUTO: 144 K/UL (ref 130–450)
PMV BLD AUTO: 12.7 FL (ref 7–12)
PO2: 314.6 MMHG (ref 75–100)
POTASSIUM SERPL-SCNC: 5.7 MMOL/L (ref 3.5–5)
PROCALCITONIN SERPL-MCNC: 0.32 NG/ML (ref 0–0.08)
PROT SERPL-MCNC: 6.9 G/DL (ref 6.4–8.3)
RBC # BLD AUTO: 5.27 M/UL (ref 3.8–5.8)
RBC # BLD: NORMAL 10*6/UL
RI(T): 1.08
RR MECHANICAL: 24 B/MIN
SODIUM SERPL-SCNC: 133 MMOL/L (ref 132–146)
SOURCE, BLOOD GAS: ABNORMAL
THB: 16.6 G/DL (ref 11.5–16.5)
TIME ANALYZED: 448
VT MECHANICAL: 450 ML
WBC OTHER # BLD: 7 K/UL (ref 4.5–11.5)

## 2025-02-01 PROCEDURE — 82805 BLOOD GASES W/O2 SATURATION: CPT

## 2025-02-01 PROCEDURE — 6370000000 HC RX 637 (ALT 250 FOR IP): Performed by: NURSE PRACTITIONER

## 2025-02-01 PROCEDURE — 2500000003 HC RX 250 WO HCPCS: Performed by: NURSE PRACTITIONER

## 2025-02-01 PROCEDURE — 36415 COLL VENOUS BLD VENIPUNCTURE: CPT

## 2025-02-01 PROCEDURE — 94660 CPAP INITIATION&MGMT: CPT

## 2025-02-01 PROCEDURE — 84145 PROCALCITONIN (PCT): CPT

## 2025-02-01 PROCEDURE — 2700000000 HC OXYGEN THERAPY PER DAY

## 2025-02-01 PROCEDURE — 80053 COMPREHEN METABOLIC PANEL: CPT

## 2025-02-01 PROCEDURE — 6370000000 HC RX 637 (ALT 250 FOR IP)

## 2025-02-01 PROCEDURE — 6360000002 HC RX W HCPCS

## 2025-02-01 PROCEDURE — 99233 SBSQ HOSP IP/OBS HIGH 50: CPT | Performed by: INTERNAL MEDICINE

## 2025-02-01 PROCEDURE — 1200000000 HC SEMI PRIVATE

## 2025-02-01 PROCEDURE — 85025 COMPLETE CBC W/AUTO DIFF WBC: CPT

## 2025-02-01 PROCEDURE — 2580000003 HC RX 258: Performed by: NURSE PRACTITIONER

## 2025-02-01 PROCEDURE — 87040 BLOOD CULTURE FOR BACTERIA: CPT

## 2025-02-01 PROCEDURE — 6360000002 HC RX W HCPCS: Performed by: NURSE PRACTITIONER

## 2025-02-01 PROCEDURE — 6360000002 HC RX W HCPCS: Performed by: INTERNAL MEDICINE

## 2025-02-01 PROCEDURE — 94640 AIRWAY INHALATION TREATMENT: CPT

## 2025-02-01 RX ORDER — LABETALOL HYDROCHLORIDE 5 MG/ML
10 INJECTION, SOLUTION INTRAVENOUS EVERY 6 HOURS PRN
Status: DISCONTINUED | OUTPATIENT
Start: 2025-02-01 | End: 2025-02-03 | Stop reason: HOSPADM

## 2025-02-01 RX ORDER — AMLODIPINE BESYLATE 5 MG/1
5 TABLET ORAL DAILY
Status: DISCONTINUED | OUTPATIENT
Start: 2025-02-01 | End: 2025-02-03 | Stop reason: HOSPADM

## 2025-02-01 RX ADMIN — GUAIFENESIN 400 MG: 400 TABLET ORAL at 08:57

## 2025-02-01 RX ADMIN — BUDESONIDE INHALATION 500 MCG: 0.5 SUSPENSION RESPIRATORY (INHALATION) at 09:14

## 2025-02-01 RX ADMIN — IPRATROPIUM BROMIDE AND ALBUTEROL SULFATE 1 DOSE: 2.5; .5 SOLUTION RESPIRATORY (INHALATION) at 09:14

## 2025-02-01 RX ADMIN — IPRATROPIUM BROMIDE AND ALBUTEROL SULFATE 1 DOSE: 2.5; .5 SOLUTION RESPIRATORY (INHALATION) at 15:59

## 2025-02-01 RX ADMIN — IPRATROPIUM BROMIDE AND ALBUTEROL SULFATE 1 DOSE: 2.5; .5 SOLUTION RESPIRATORY (INHALATION) at 11:13

## 2025-02-01 RX ADMIN — WATER 40 MG: 1 INJECTION INTRAMUSCULAR; INTRAVENOUS; SUBCUTANEOUS at 15:05

## 2025-02-01 RX ADMIN — ENOXAPARIN SODIUM 40 MG: 100 INJECTION SUBCUTANEOUS at 08:58

## 2025-02-01 RX ADMIN — BUDESONIDE INHALATION 500 MCG: 0.5 SUSPENSION RESPIRATORY (INHALATION) at 21:52

## 2025-02-01 RX ADMIN — GUAIFENESIN 400 MG: 400 TABLET ORAL at 20:52

## 2025-02-01 RX ADMIN — SODIUM CHLORIDE, PRESERVATIVE FREE 10 ML: 5 INJECTION INTRAVENOUS at 09:14

## 2025-02-01 RX ADMIN — ARFORMOTEROL TARTRATE 15 MCG: 15 SOLUTION RESPIRATORY (INHALATION) at 21:52

## 2025-02-01 RX ADMIN — AZITHROMYCIN MONOHYDRATE 500 MG: 500 INJECTION, POWDER, LYOPHILIZED, FOR SOLUTION INTRAVENOUS at 15:08

## 2025-02-01 RX ADMIN — WATER 40 MG: 1 INJECTION INTRAMUSCULAR; INTRAVENOUS; SUBCUTANEOUS at 02:30

## 2025-02-01 RX ADMIN — SODIUM CHLORIDE, PRESERVATIVE FREE 10 ML: 5 INJECTION INTRAVENOUS at 20:52

## 2025-02-01 RX ADMIN — ARFORMOTEROL TARTRATE 15 MCG: 15 SOLUTION RESPIRATORY (INHALATION) at 09:14

## 2025-02-01 RX ADMIN — GUAIFENESIN 400 MG: 400 TABLET ORAL at 15:05

## 2025-02-01 RX ADMIN — IPRATROPIUM BROMIDE AND ALBUTEROL SULFATE 1 DOSE: 2.5; .5 SOLUTION RESPIRATORY (INHALATION) at 21:52

## 2025-02-01 RX ADMIN — AMLODIPINE BESYLATE 5 MG: 5 TABLET ORAL at 08:57

## 2025-02-01 RX ADMIN — HYDRALAZINE HYDROCHLORIDE 10 MG: 20 INJECTION INTRAMUSCULAR; INTRAVENOUS at 02:33

## 2025-02-01 RX ADMIN — SODIUM POLYSTYRENE SULFONATE 15 G: 15 SUSPENSION ORAL; RECTAL at 09:57

## 2025-02-01 ASSESSMENT — PAIN SCALES - GENERAL
PAINLEVEL_OUTOF10: 0
PAINLEVEL_OUTOF10: 0

## 2025-02-01 NOTE — H&P
[facility]  History and Physical      CHIEF COMPLAINT:    Chief Complaint   Patient presents with    Shortness of Breath     X1 week with cough, 85% on RA now 96% after duoneb on 6L        Patient of Royce Dowell MD presents with:  Acute hypoxic respiratory failure    History of Present Illness:   Mr. Niño is a 69 year old male with a past medical history of COPD.   Mr. Niño presented to [facility] ED on 01/31/2025 with complaints of worsening dyspnea over the past week with cough.  EMS was summoned and upon their arrival his SaO2 reportedly was 85% on RA for which she was placed on 6 L NC and received DuoNeb and route with repeat SaO2 96%.  Upon arrival to the ED his VS were oral temperature 99.1-131-20-96% RA-192/104.  Respiratory panel positive for influenza A.  WBC 9.6.  H&H 16.6/51.2.  .  K5.2.  BUN/SCr 17/1.1.  NT proBNP 589.  Troponin 27.  CXR unremarkable.  EKG ST with .  He received DuoNeb, magnesium sulfate 2 g IV.  He was admitted to a telemetry monitored unit for continued management.  On 01/31/2025 after admission to the telemetry monitored unit an RRT was called for concern of stroke.  Upon arrival, he was nonverbal but able to follow commands without neuro deficit.  He was noted to be in respiratory distress.  Of note, he recently received ceftriaxone.  He was evaluated by Dr. Ferguson (Neurology) with no need for CT of the head as he returned to baseline.  He received methylprednisolone and Decadron.  Given risk for acute decompensation he was transferred to MICU for further management.  On evaluation he is resting comfortably in the ICU setting.  Multiple family members are at bedside.  He denies any acute complaints, he still appears quite dyspneic and acutely ill on my evaluation today.  He is asking about being discharged to home.  He does have a history of COPD for which she does not follow with pulmonology as an outpatient.  Unfortunately he received Rocephin  in the ER and developed a significant reaction requiring racemic epi, steroids, Protonix etc. he also had to wear BiPAP overnight which she is not very happy about.    REVIEW OF SYSTEMS:  Pertinent negatives are above in HPI.  10 point ROS otherwise negative.      No past medical history on file.      No past surgical history on file.    Medications Prior to Admission:    Medications Prior to Admission: budesonide-formoterol (SYMBICORT) 160-4.5 MCG/ACT AERO, Inhale 2 puffs into the lungs 2 times daily  tiotropium (SPIRIVA) 18 MCG inhalation capsule, Inhale 1 capsule into the lungs daily  albuterol sulfate  (90 Base) MCG/ACT inhaler, Inhale 2 puffs into the lungs every 4 hours as needed for Wheezing or Shortness of Breath    Note that the patient's home medications were reviewed and the above list is accurate to the best of my knowledge at the time of the exam.    Allergies:    Rocephin [ceftriaxone] and Shellfish-derived products    Social History:    reports that he quit smoking about 12 years ago. His smoking use included cigarettes. He quit smokeless tobacco use about 4 years ago.  His smokeless tobacco use included snuff. He reports current alcohol use of about 6.0 standard drinks of alcohol per week.    Family History:   He was adopted. Family history is unknown by patient.      PHYSICAL EXAM:    Vitals:  BP (!) 158/80   Pulse (!) 101   Temp 97.5 °F (36.4 °C) (Temporal)   Resp 23   Ht 1.778 m (5' 10\")   Wt 90.7 kg (199 lb 15.3 oz)   SpO2 99%   BMI 28.69 kg/m²       General appearance: NAD, conversant, appears acutely ill  Eyes: Sclerae anicteric, PERRLA  HEENT: AT/NC, MMM  Neck: FROM, supple, no thyromegaly  Lymph: No cervical / supraclavicular lymphadenopathy  Lungs: Coarse breath sounds bilateral lung fields  CV: Sinus tachycardia, no MRGs, no lower extremity edema  Abdomen: Soft, non-tender; no masses or HSM, +BS  Extremities: FROM without synovitis.  No clubbing or cyanosis of the

## 2025-02-01 NOTE — PROGRESS NOTES
Notified by nursing staff regarding patient's lethargy soon after he came up to the floor from ER  ABGs and stat head CT ordered   RRT called shortly thereafter by nursing staff due to concern for acute stroke   RRT's team's ASSESSMENT: thought to be acute reaction to IV Rocephin  Epi/methylprednisolone Decadron famotidine given with improvement in symptoms  List penicillin as allergy  Discussed with medical resident  Monitor in ICU  Can likely transfer out tomorrow    Pam Shafer MD  1/31/2025

## 2025-02-01 NOTE — PROGRESS NOTES
4 Eyes Skin Assessment     NAME:  Frank Niño  YOB: 1955  MEDICAL RECORD NUMBER:  [id number]    The patient is being assessed for  Transfer to New Unit    I agree that at least one RN has performed a thorough Head to Toe Skin Assessment on the patient. ALL assessment sites listed below have been assessed.      Areas assessed by both nurses:    Head, Face, Ears, Shoulders, Back, Chest, Arms, Elbows, Hands, Sacrum. Buttock, Coccyx, Ischium, and Legs. Feet and Heels        Does the Patient have a Wound? No noted wound(s)       [facility] Prevention initiated by RN: Yes  Wound Care Orders initiated by RN: No    Pressure Injury (Stage 3,4, Unstageable, DTI, NWPT, and Complex wounds) if present, place Wound referral order by RN under : No    New Ostomies, if present place, Ostomy referral order under : No     Nurse 1 eSignature: Electronically signed by Kalyani Dempsey RN on 2/1/25 at 6:43 PM EST    **SHARE this note so that the co-signing nurse can place an eSignature**    Nurse 2 eSignature: Electronically signed by Antonio Packer RN on 2/2/25 at 7:09 AM EST

## 2025-02-01 NOTE — PROGRESS NOTES
CRITICAL CARE PROGRESS NOTE      [facility]  Department of Pulmonary, Critical Care and Sleep Medicine  [facility]  Dr. Melton, Dr. Ramos, Dr. Israel    SUBJECTIVE:    Patient seen and examined at bedside.  Tolerated the BiPAP overnight.  Overall he is feeling much better today he is asking whether or not he will be able to be discharged today.    OBJECTIVE:  Vitals:    02/01/25 1113 02/01/25 1200 02/01/25 1300 02/01/25 1400   BP:  (!) 143/81 (!) 154/77 (!) 160/78   Pulse: (!) 101 (!) 116 (!) 105 (!) 105   Resp: 22 21 21 26   Temp:       TempSrc:       SpO2: 95% 95% 94% 95%   Weight:       Height:           1. General: Alert and oriented x 3, No acute distress.  2. Eyes: Vision - grossly normal, PERRLA   3. HENT: Head is atraumatic & normocephalic. Neck Supple, No jugular venous distention   4. Respiratory: Continues to have expiratory wheezing bilaterally  5. Cardiovascular: S1, S2 normal, Regular rate & rhythm, No murmur, No pedal edema   6. Gastrointestinal: Soft, Non-tender, Non-distended, Normal bowel sounds. No organomegaly.  7. Neurologic: Awake & Alert, Cranial nerves 2-12 grossly intact, No focal motor or sensory deficits.  8. Skin: no rashes, breakdown  9. Musculoskeletal: no LE edema, no joint effusion.  10. Psychiatric - No Anxiety, Depression    DATA:    CBC:   Lab Results   Component Value Date    WBC 7.0 02/01/2025    HGB 16.2 02/01/2025    HCT 50.5 02/01/2025    MCV 95.8 02/01/2025     02/01/2025     LFTs:   Lab Results   Component Value Date    ALT 24 02/01/2025    AST 49 (H) 02/01/2025    ALKPHOS 103 02/01/2025    BILITOT 0.3 02/01/2025    ALBUMIN 3.8 02/01/2025     Coags: No results found for: \"INR\"    RADIOLOGY:      XR CHEST PORTABLE    Result Date: 1/31/2025  EXAMINATION: ONE XRAY VIEW OF THE CHEST 1/31/2025 2:37 pm COMPARISON: 8:26 a.m. HISTORY: ORDERING SYSTEM PROVIDED HISTORY: Hypoxic TECHNOLOGIST PROVIDED

## 2025-02-01 NOTE — PLAN OF CARE
Problem: Discharge Planning  Goal: Discharge to home or other facility with appropriate resources  Outcome: Progressing     Problem: Skin/Tissue Integrity  Goal: Skin integrity remains intact  Description: 1.  Monitor for areas of redness and/or skin breakdown  2.  Assess vascular access sites hourly  3.  Every 4-6 hours minimum:  Change oxygen saturation probe site  4.  Every 4-6 hours:  If on nasal continuous positive airway pressure, respiratory therapy assess nares and determine need for appliance change or resting period  Outcome: Progressing  Flowsheets (Taken 2/1/2025 0800)  Skin Integrity Remains Intact: Monitor for areas of redness and/or skin breakdown     Problem: Neurosensory - Adult  Goal: Achieves stable or improved neurological status  Outcome: Progressing  Flowsheets (Taken 2/1/2025 0800)  Achieves stable or improved neurological status: Assess for and report changes in neurological status     Problem: Respiratory - Adult  Goal: Achieves optimal ventilation and oxygenation  Outcome: Progressing     Problem: Cardiovascular - Adult  Goal: Absence of cardiac dysrhythmias or at baseline  Outcome: Progressing     Problem: Skin/Tissue Integrity - Adult  Goal: Skin integrity remains intact  Description: 1.  Monitor for areas of redness and/or skin breakdown  2.  Assess vascular access sites hourly  3.  Every 4-6 hours minimum:  Change oxygen saturation probe site  4.  Every 4-6 hours:  If on nasal continuous positive airway pressure, respiratory therapy assess nares and determine need for appliance change or resting period  Outcome: Progressing  Flowsheets (Taken 2/1/2025 0800)  Skin Integrity Remains Intact: Monitor for areas of redness and/or skin breakdown     Problem: Pain  Goal: Verbalizes/displays adequate comfort level or baseline comfort level  Outcome: Progressing  Flowsheets (Taken 2/1/2025 0800)  Verbalizes/displays adequate comfort level or baseline comfort level: Encourage patient to monitor

## 2025-02-01 NOTE — PLAN OF CARE
Problem: Skin/Tissue Integrity  Goal: Skin integrity remains intact  Description: 1.  Monitor for areas of redness and/or skin breakdown  2.  Assess vascular access sites hourly  3.  Every 4-6 hours minimum:  Change oxygen saturation probe site  4.  Every 4-6 hours:  If on nasal continuous positive airway pressure, respiratory therapy assess nares and determine need for appliance change or resting period  1/31/2025 2123 by Julissa Faye RN  Outcome: Progressing  Flowsheets  Taken 1/31/2025 2122  Skin Integrity Remains Intact:   Monitor for areas of redness and/or skin breakdown   Assess vascular access sites hourly  Taken 1/31/2025 2000  Skin Integrity Remains Intact:   Monitor for areas of redness and/or skin breakdown   Assess vascular access sites hourly     Problem: Respiratory - Adult  Goal: Achieves optimal ventilation and oxygenation  1/31/2025 2123 by Julissa Faye RN  Outcome: Progressing     Problem: Skin/Tissue Integrity - Adult  Goal: Skin integrity remains intact  Description: 1.  Monitor for areas of redness and/or skin breakdown  2.  Assess vascular access sites hourly  3.  Every 4-6 hours minimum:  Change oxygen saturation probe site  4.  Every 4-6 hours:  If on nasal continuous positive airway pressure, respiratory therapy assess nares and determine need for appliance change or resting period  1/31/2025 2123 by Julissa Faye RN  Outcome: Progressing  Flowsheets  Taken 1/31/2025 2122  Skin Integrity Remains Intact:   Monitor for areas of redness and/or skin breakdown   Assess vascular access sites hourly  Taken 1/31/2025 2000  Skin Integrity Remains Intact:   Monitor for areas of redness and/or skin breakdown   Assess vascular access sites hourly     Problem: Pain  Goal: Verbalizes/displays adequate comfort level or baseline comfort level  1/31/2025 2123 by Julissa Faye RN  Outcome: Progressing     Problem: Safety - Adult  Goal: Free from fall injury  1/31/2025 2123 by Julissa Faye,

## 2025-02-02 LAB
ALBUMIN SERPL-MCNC: 4.1 G/DL (ref 3.5–5.2)
ALP SERPL-CCNC: 102 U/L (ref 40–129)
ALT SERPL-CCNC: 32 U/L (ref 0–40)
ANION GAP SERPL CALCULATED.3IONS-SCNC: 12 MMOL/L (ref 7–16)
AST SERPL-CCNC: 45 U/L (ref 0–39)
BASOPHILS # BLD: 0.01 K/UL (ref 0–0.2)
BASOPHILS NFR BLD: 0 % (ref 0–2)
BILIRUB SERPL-MCNC: 0.4 MG/DL (ref 0–1.2)
BUN SERPL-MCNC: 27 MG/DL (ref 6–23)
CALCIUM SERPL-MCNC: 9.1 MG/DL (ref 8.6–10.2)
CHLORIDE SERPL-SCNC: 96 MMOL/L (ref 98–107)
CO2 SERPL-SCNC: 30 MMOL/L (ref 22–29)
CREAT SERPL-MCNC: 1 MG/DL (ref 0.7–1.2)
EOSINOPHIL # BLD: 0 K/UL (ref 0.05–0.5)
EOSINOPHILS RELATIVE PERCENT: 0 % (ref 0–6)
ERYTHROCYTE [DISTWIDTH] IN BLOOD BY AUTOMATED COUNT: 12.5 % (ref 11.5–15)
GFR, ESTIMATED: 84 ML/MIN/1.73M2
GLUCOSE SERPL-MCNC: 131 MG/DL (ref 74–99)
HCT VFR BLD AUTO: 52.2 % (ref 37–54)
HGB BLD-MCNC: 16.5 G/DL (ref 12.5–16.5)
IMM GRANULOCYTES # BLD AUTO: 0.08 K/UL (ref 0–0.58)
IMM GRANULOCYTES NFR BLD: 1 % (ref 0–5)
LYMPHOCYTES NFR BLD: 0.59 K/UL (ref 1.5–4)
LYMPHOCYTES RELATIVE PERCENT: 4 % (ref 20–42)
MCH RBC QN AUTO: 30.6 PG (ref 26–35)
MCHC RBC AUTO-ENTMCNC: 31.6 G/DL (ref 32–34.5)
MCV RBC AUTO: 96.8 FL (ref 80–99.9)
MONOCYTES NFR BLD: 1.03 K/UL (ref 0.1–0.95)
MONOCYTES NFR BLD: 7 % (ref 2–12)
NEUTROPHILS NFR BLD: 88 % (ref 43–80)
NEUTS SEG NFR BLD: 13.11 K/UL (ref 1.8–7.3)
PLATELET # BLD AUTO: 170 K/UL (ref 130–450)
PMV BLD AUTO: 13 FL (ref 7–12)
POTASSIUM SERPL-SCNC: 4.8 MMOL/L (ref 3.5–5)
PROT SERPL-MCNC: 7 G/DL (ref 6.4–8.3)
RBC # BLD AUTO: 5.39 M/UL (ref 3.8–5.8)
RBC # BLD: NORMAL 10*6/UL
SODIUM SERPL-SCNC: 138 MMOL/L (ref 132–146)
WBC OTHER # BLD: 14.8 K/UL (ref 4.5–11.5)

## 2025-02-02 PROCEDURE — 94640 AIRWAY INHALATION TREATMENT: CPT

## 2025-02-02 PROCEDURE — 6370000000 HC RX 637 (ALT 250 FOR IP): Performed by: NURSE PRACTITIONER

## 2025-02-02 PROCEDURE — 94660 CPAP INITIATION&MGMT: CPT

## 2025-02-02 PROCEDURE — 2700000000 HC OXYGEN THERAPY PER DAY

## 2025-02-02 PROCEDURE — 6360000002 HC RX W HCPCS: Performed by: NURSE PRACTITIONER

## 2025-02-02 PROCEDURE — 85025 COMPLETE CBC W/AUTO DIFF WBC: CPT

## 2025-02-02 PROCEDURE — 6370000000 HC RX 637 (ALT 250 FOR IP)

## 2025-02-02 PROCEDURE — 99233 SBSQ HOSP IP/OBS HIGH 50: CPT | Performed by: INTERNAL MEDICINE

## 2025-02-02 PROCEDURE — 36415 COLL VENOUS BLD VENIPUNCTURE: CPT

## 2025-02-02 PROCEDURE — 2580000003 HC RX 258: Performed by: NURSE PRACTITIONER

## 2025-02-02 PROCEDURE — 6360000002 HC RX W HCPCS

## 2025-02-02 PROCEDURE — 6360000002 HC RX W HCPCS: Performed by: INTERNAL MEDICINE

## 2025-02-02 PROCEDURE — 2500000003 HC RX 250 WO HCPCS: Performed by: NURSE PRACTITIONER

## 2025-02-02 PROCEDURE — 1200000000 HC SEMI PRIVATE

## 2025-02-02 PROCEDURE — 80053 COMPREHEN METABOLIC PANEL: CPT

## 2025-02-02 RX ADMIN — PREDNISONE 20 MG: 20 TABLET ORAL at 13:42

## 2025-02-02 RX ADMIN — HYDRALAZINE HYDROCHLORIDE 10 MG: 20 INJECTION INTRAMUSCULAR; INTRAVENOUS at 21:29

## 2025-02-02 RX ADMIN — BUDESONIDE INHALATION 500 MCG: 0.5 SUSPENSION RESPIRATORY (INHALATION) at 22:22

## 2025-02-02 RX ADMIN — IPRATROPIUM BROMIDE AND ALBUTEROL SULFATE 1 DOSE: 2.5; .5 SOLUTION RESPIRATORY (INHALATION) at 08:35

## 2025-02-02 RX ADMIN — IPRATROPIUM BROMIDE AND ALBUTEROL SULFATE 1 DOSE: 2.5; .5 SOLUTION RESPIRATORY (INHALATION) at 22:22

## 2025-02-02 RX ADMIN — GUAIFENESIN 400 MG: 400 TABLET ORAL at 15:40

## 2025-02-02 RX ADMIN — ARFORMOTEROL TARTRATE 15 MCG: 15 SOLUTION RESPIRATORY (INHALATION) at 08:35

## 2025-02-02 RX ADMIN — SODIUM CHLORIDE, PRESERVATIVE FREE 10 ML: 5 INJECTION INTRAVENOUS at 21:29

## 2025-02-02 RX ADMIN — WATER 40 MG: 1 INJECTION INTRAMUSCULAR; INTRAVENOUS; SUBCUTANEOUS at 01:11

## 2025-02-02 RX ADMIN — GUAIFENESIN 400 MG: 400 TABLET ORAL at 08:12

## 2025-02-02 RX ADMIN — AMLODIPINE BESYLATE 5 MG: 5 TABLET ORAL at 08:12

## 2025-02-02 RX ADMIN — PREDNISONE 20 MG: 20 TABLET ORAL at 21:30

## 2025-02-02 RX ADMIN — IPRATROPIUM BROMIDE AND ALBUTEROL SULFATE 1 DOSE: 2.5; .5 SOLUTION RESPIRATORY (INHALATION) at 12:31

## 2025-02-02 RX ADMIN — AZITHROMYCIN MONOHYDRATE 500 MG: 500 INJECTION, POWDER, LYOPHILIZED, FOR SOLUTION INTRAVENOUS at 13:41

## 2025-02-02 RX ADMIN — ARFORMOTEROL TARTRATE 15 MCG: 15 SOLUTION RESPIRATORY (INHALATION) at 22:22

## 2025-02-02 RX ADMIN — SODIUM CHLORIDE, PRESERVATIVE FREE 10 ML: 5 INJECTION INTRAVENOUS at 08:14

## 2025-02-02 RX ADMIN — BUDESONIDE INHALATION 500 MCG: 0.5 SUSPENSION RESPIRATORY (INHALATION) at 08:35

## 2025-02-02 RX ADMIN — GUAIFENESIN 400 MG: 400 TABLET ORAL at 21:30

## 2025-02-02 RX ADMIN — ENOXAPARIN SODIUM 40 MG: 100 INJECTION SUBCUTANEOUS at 08:14

## 2025-02-02 NOTE — FLOWSHEET NOTE
Family wants to follow up with Dr. Bryan on discharge. Dr. Ramos asked to be updated.  Dr. Mcclendon was on in perfect serve and messaged sent to him.

## 2025-02-02 NOTE — PROGRESS NOTES
Date: 2/1/2025    Time: 11:44 PM    Patient Placed On BIPAP/CPAP/ Non-Invasive Ventilation?  Yes    If no must comment.  Facial area red/color change? No           If YES are Blister/Lesion present?No   If yes must notify nursing staff  BIPAP/CPAP skin barrier?  Yes    Skin barrier type:mepilexlite       Comments:        Gagan Diallo RCP

## 2025-02-02 NOTE — PROGRESS NOTES
[facility]  Department of Pulmonary, Critical Care and Sleep Medicine  [facility]  Dr. Geronimo Persaud, APRN-CNP    SUBJECTIVE:    Patient seen and examined at bedside. Just came back from the restroom, does not have his oxygen on, breathing is labored. Assisted with getting oxygen back on. Frank is alert and oriented. States he wore BIPAP for a while last night and then \"ripped it off\".     OBJECTIVE:  Vitals:    02/01/25 1559 02/01/25 1810 02/01/25 2047 02/02/25 0730   BP:  (!) 143/77 (!) 157/77 (!) 148/95   Pulse: (!) 105 98 (!) 105 100   Resp: 21 18 18 18   Temp:  97.9 °F (36.6 °C) 98.4 °F (36.9 °C) 97.8 °F (36.6 °C)   TempSrc:  Temporal Temporal Temporal   SpO2: 97% 95% 95% 95%   Weight:       Height:           1. General: Alert and oriented x 3, No acute distress.  2. Eyes: Vision - grossly normal, PERRLA   3. HENT: Head is atraumatic & normocephalic. Neck Supple, No jugular venous distention   4. Respiratory: Continues to have expiratory wheezing bilaterally  5. Cardiovascular: S1, S2 normal, Regular rate & rhythm, No murmur, No pedal edema   6. Gastrointestinal: Soft, Non-tender, Non-distended, Normal bowel sounds. No organomegaly.  7. Neurologic: Awake & Alert, Cranial nerves 2-12 grossly intact, No focal motor or sensory deficits.  8. Skin: no rashes, breakdown  9. Musculoskeletal: no LE edema, no joint effusion.  10. Psychiatric - No Anxiety, Depression    DATA:    CBC:   Lab Results   Component Value Date    WBC 14.8 (H) 02/02/2025    HGB 16.5 02/02/2025    HCT 52.2 02/02/2025    MCV 96.8 02/02/2025     02/02/2025     LFTs:   Lab Results   Component Value Date    ALT 32 02/02/2025    AST 45 (H) 02/02/2025    ALKPHOS 102 02/02/2025    BILITOT 0.4 02/02/2025    ALBUMIN 4.1 02/02/2025     Coags: No results found for: \"INR\"    RADIOLOGY:      XR CHEST PORTABLE    Result Date:  1/31/2025  EXAMINATION: ONE XRAY VIEW OF THE CHEST 1/31/2025 2:37 pm COMPARISON: 8:26 a.m. HISTORY: ORDERING SYSTEM PROVIDED HISTORY: Hypoxic TECHNOLOGIST PROVIDED HISTORY: Reason for exam:->Hypoxic FINDINGS: The lungs are without acute focal process.  There is no effusion or pneumothorax. The cardiomediastinal silhouette is without acute process. The osseous structures are without acute process.     No acute process.     XR CHEST PORTABLE    Result Date: 1/31/2025  EXAMINATION: ONE XRAY VIEW OF THE CHEST 1/31/2025 8:33 am COMPARISON: May 20, 2022. HISTORY: ORDERING SYSTEM PROVIDED HISTORY: sob TECHNOLOGIST PROVIDED HISTORY: Reason for exam:->sob FINDINGS: The lungs are without acute focal process.  There is no effusion or pneumothorax. The cardiomediastinal silhouette is without acute process. The osseous structures are without acute process.  There is slight rotation to the right.  Several monitoring leads seen over the chest.  External tubing and a device projects over the right mid upper chest.     Heart size is within normal limits. Lungs are clear. No pleural effusion.       CBC with Differential:    Lab Results   Component Value Date/Time    WBC 14.8 02/02/2025 07:43 AM    RBC 5.39 02/02/2025 07:43 AM    HGB 16.5 02/02/2025 07:43 AM    HCT 52.2 02/02/2025 07:43 AM     02/02/2025 07:43 AM    MCV 96.8 02/02/2025 07:43 AM    MCH 30.6 02/02/2025 07:43 AM    MCHC 31.6 02/02/2025 07:43 AM    RDW 12.5 02/02/2025 07:43 AM    NRBC PENDING 02/02/2025 07:43 AM    METASPCT PENDING 02/02/2025 07:43 AM    LYMPHOPCT PENDING 02/02/2025 07:43 AM    PROMYELOPCT PENDING 02/02/2025 07:43 AM    MONOPCT PENDING 02/02/2025 07:43 AM    MYELOPCT PENDING 02/02/2025 07:43 AM    EOSPCT PENDING 02/02/2025 07:43 AM    BASOPCT PENDING 02/02/2025 07:43 AM    MONOSABS PENDING 02/02/2025 07:43 AM    LYMPHSABS PENDING 02/02/2025 07:43 AM    EOSABS PENDING 02/02/2025 07:43 AM    BASOSABS PENDING 02/02/2025 07:43 AM     CMP:    Lab

## 2025-02-02 NOTE — PLAN OF CARE
Problem: Discharge Planning  Goal: Discharge to home or other facility with appropriate resources  2/1/2025 2228 by Destiny Hull RN  Outcome: Progressing  2/1/2025 1825 by Kalyani Dempsey, RN  Outcome: Progressing  Flowsheets (Taken 2/1/2025 1825)  Discharge to home or other facility with appropriate resources: Identify barriers to discharge with patient and caregiver  2/1/2025 1529 by Geneva Hogue  Outcome: Progressing     Problem: Skin/Tissue Integrity  Goal: Skin integrity remains intact  Description: 1.  Monitor for areas of redness and/or skin breakdown  2.  Assess vascular access sites hourly  3.  Every 4-6 hours minimum:  Change oxygen saturation probe site  4.  Every 4-6 hours:  If on nasal continuous positive airway pressure, respiratory therapy assess nares and determine need for appliance change or resting period  2/1/2025 2228 by Destiny Hull RN  Outcome: Progressing  2/1/2025 1825 by Kalyani Dempsey, RN  Outcome: Progressing  Flowsheets (Taken 2/1/2025 1825)  Skin Integrity Remains Intact: Monitor for areas of redness and/or skin breakdown  2/1/2025 1529 by Geneva Hogue  Outcome: Progressing  Flowsheets (Taken 2/1/2025 0800)  Skin Integrity Remains Intact: Monitor for areas of redness and/or skin breakdown     Problem: Neurosensory - Adult  Goal: Achieves stable or improved neurological status  2/1/2025 2228 by Destiny Hull RN  Outcome: Progressing  2/1/2025 1825 by Kalyani Demspey, RN  Outcome: Progressing  2/1/2025 1529 by Geneva Hogue  Outcome: Progressing  Flowsheets (Taken 2/1/2025 0800)  Achieves stable or improved neurological status: Assess for and report changes in neurological status     Problem: Respiratory - Adult  Goal: Achieves optimal ventilation and oxygenation  2/1/2025 2228 by Destiny Hull RN  Outcome: Progressing  2/1/2025 1825 by Kalyani Dempsey, RN  Outcome: Progressing  2/1/2025 1529 by Geneva Hogue  Outcome: Progressing     Problem: Cardiovascular -

## 2025-02-02 NOTE — PROGRESS NOTES
25* 27*   CREATININE 1.0 0.9 1.0   CALCIUM 8.1* 8.3* 9.1     01/31/2025 CXR #1:  Heart size is within normal limits. Lungs are clear. No pleural effusion.     01/31/2025 CXR #2:  No acute process    ASSESSMENT:  Acute hypoxic and hypercapnic respiratory failure.  Procalcitonin 0.32 (02/01)  COPD exacerbation  + Influenza A  Aphasia after receiving ceftriaxone with accompanying dyspnea that resolved upon receiving steroids--> concern for angioedema  Hyperkalemia  Hyperglycemia on steroids        PLAN:  Continue IV steroids, Tamiflu if available for influenza A   Allergic reaction to Rocephin-now improved after acute intervention by ICU team yesterday   monitor VS  Continue steroids, azithromycin, inhaled nebulizer therapy  Kayexalate x 1  Monitor BMP, CBC  Appreciate Pulmonology input-Case discussed with Dr. Ramos in ICU  Anxious for discharge home does not like wearing noninvasive ventilation but needs it  Discussed with patient and family that he is not yet ready for discharge home  Will need to establish with pulmonology to continue to follow as outpatient     02/02/2025  Acute hypoxic and hypercapnic respiratory failure  COPD exacerbation  + Influenza A  Allergic reaction to Rocephin with concern for angioedema  Continue Azithromycin, Prednisone, Tamiflu  SaO2 93% on 2L NC  Obtain ambulatory pulse ox  Patient requesting decreased NIV settings (per his discussion with RT)--> discussed with Nursing Staff, will defer to Pulmonology  Hyperkalemia: Treated and resolved  Leukocytosis, received steroids/afebrile  Discharge planning, when okay with Pulmonology      Code status: FULL  Consultants: Pulmonology  DVT Prophylaxis Lovenox 40 mg daily  PT/OT  Discharge planning           GREGORIA Cain - CNP,  6:16 PM  2/2/2025

## 2025-02-03 VITALS
SYSTOLIC BLOOD PRESSURE: 158 MMHG | RESPIRATION RATE: 20 BRPM | HEIGHT: 70 IN | BODY MASS INDEX: 28.63 KG/M2 | TEMPERATURE: 98.2 F | OXYGEN SATURATION: 92 % | WEIGHT: 199.96 LBS | DIASTOLIC BLOOD PRESSURE: 72 MMHG | HEART RATE: 106 BPM

## 2025-02-03 LAB
ALBUMIN SERPL-MCNC: 3.9 G/DL (ref 3.5–5.2)
ALP SERPL-CCNC: 101 U/L (ref 40–129)
ALT SERPL-CCNC: 33 U/L (ref 0–40)
ANION GAP SERPL CALCULATED.3IONS-SCNC: 7 MMOL/L (ref 7–16)
AST SERPL-CCNC: 42 U/L (ref 0–39)
BASOPHILS # BLD: 0.01 K/UL (ref 0–0.2)
BASOPHILS NFR BLD: 0 % (ref 0–2)
BILIRUB SERPL-MCNC: 0.5 MG/DL (ref 0–1.2)
BUN SERPL-MCNC: 20 MG/DL (ref 6–23)
CALCIUM SERPL-MCNC: 9 MG/DL (ref 8.6–10.2)
CHLORIDE SERPL-SCNC: 94 MMOL/L (ref 98–107)
CO2 SERPL-SCNC: 35 MMOL/L (ref 22–29)
CREAT SERPL-MCNC: 1 MG/DL (ref 0.7–1.2)
EKG ATRIAL RATE: 130 BPM
EKG P AXIS: 82 DEGREES
EKG P-R INTERVAL: 136 MS
EKG Q-T INTERVAL: 298 MS
EKG QRS DURATION: 86 MS
EKG QTC CALCULATION (BAZETT): 438 MS
EKG R AXIS: 90 DEGREES
EKG T AXIS: 76 DEGREES
EKG VENTRICULAR RATE: 130 BPM
EOSINOPHIL # BLD: 0 K/UL (ref 0.05–0.5)
EOSINOPHILS RELATIVE PERCENT: 0 % (ref 0–6)
ERYTHROCYTE [DISTWIDTH] IN BLOOD BY AUTOMATED COUNT: 12.4 % (ref 11.5–15)
GFR, ESTIMATED: 86 ML/MIN/1.73M2
GLUCOSE SERPL-MCNC: 115 MG/DL (ref 74–99)
HCT VFR BLD AUTO: 49.2 % (ref 37–54)
HGB BLD-MCNC: 15.6 G/DL (ref 12.5–16.5)
IMM GRANULOCYTES # BLD AUTO: 0.05 K/UL (ref 0–0.58)
IMM GRANULOCYTES NFR BLD: 1 % (ref 0–5)
LYMPHOCYTES NFR BLD: 0.95 K/UL (ref 1.5–4)
LYMPHOCYTES RELATIVE PERCENT: 9 % (ref 20–42)
MCH RBC QN AUTO: 30.3 PG (ref 26–35)
MCHC RBC AUTO-ENTMCNC: 31.7 G/DL (ref 32–34.5)
MCV RBC AUTO: 95.5 FL (ref 80–99.9)
MONOCYTES NFR BLD: 1.36 K/UL (ref 0.1–0.95)
MONOCYTES NFR BLD: 13 % (ref 2–12)
NEUTROPHILS NFR BLD: 78 % (ref 43–80)
NEUTS SEG NFR BLD: 8.32 K/UL (ref 1.8–7.3)
PLATELET # BLD AUTO: 153 K/UL (ref 130–450)
PMV BLD AUTO: 12.3 FL (ref 7–12)
POTASSIUM SERPL-SCNC: 4.6 MMOL/L (ref 3.5–5)
PROCALCITONIN SERPL-MCNC: 0.11 NG/ML (ref 0–0.08)
PROT SERPL-MCNC: 6.6 G/DL (ref 6.4–8.3)
RBC # BLD AUTO: 5.15 M/UL (ref 3.8–5.8)
SODIUM SERPL-SCNC: 136 MMOL/L (ref 132–146)
WBC OTHER # BLD: 10.7 K/UL (ref 4.5–11.5)

## 2025-02-03 PROCEDURE — 6370000000 HC RX 637 (ALT 250 FOR IP): Performed by: NURSE PRACTITIONER

## 2025-02-03 PROCEDURE — 6370000000 HC RX 637 (ALT 250 FOR IP)

## 2025-02-03 PROCEDURE — 6360000002 HC RX W HCPCS

## 2025-02-03 PROCEDURE — 97535 SELF CARE MNGMENT TRAINING: CPT

## 2025-02-03 PROCEDURE — 36415 COLL VENOUS BLD VENIPUNCTURE: CPT

## 2025-02-03 PROCEDURE — 6360000002 HC RX W HCPCS: Performed by: NURSE PRACTITIONER

## 2025-02-03 PROCEDURE — 94640 AIRWAY INHALATION TREATMENT: CPT

## 2025-02-03 PROCEDURE — 80053 COMPREHEN METABOLIC PANEL: CPT

## 2025-02-03 PROCEDURE — 85025 COMPLETE CBC W/AUTO DIFF WBC: CPT

## 2025-02-03 PROCEDURE — 6370000000 HC RX 637 (ALT 250 FOR IP): Performed by: INTERNAL MEDICINE

## 2025-02-03 PROCEDURE — 84145 PROCALCITONIN (PCT): CPT

## 2025-02-03 PROCEDURE — 2700000000 HC OXYGEN THERAPY PER DAY

## 2025-02-03 PROCEDURE — 93010 ELECTROCARDIOGRAM REPORT: CPT | Performed by: INTERNAL MEDICINE

## 2025-02-03 PROCEDURE — 97165 OT EVAL LOW COMPLEX 30 MIN: CPT

## 2025-02-03 PROCEDURE — 97530 THERAPEUTIC ACTIVITIES: CPT

## 2025-02-03 PROCEDURE — 94660 CPAP INITIATION&MGMT: CPT

## 2025-02-03 PROCEDURE — 2580000003 HC RX 258: Performed by: NURSE PRACTITIONER

## 2025-02-03 PROCEDURE — 2500000003 HC RX 250 WO HCPCS: Performed by: NURSE PRACTITIONER

## 2025-02-03 PROCEDURE — 6360000002 HC RX W HCPCS: Performed by: INTERNAL MEDICINE

## 2025-02-03 PROCEDURE — 97161 PT EVAL LOW COMPLEX 20 MIN: CPT

## 2025-02-03 RX ORDER — PREDNISONE 20 MG/1
20 TABLET ORAL 2 TIMES DAILY
Qty: 20 TABLET | Refills: 0 | Status: SHIPPED | OUTPATIENT
Start: 2025-02-03 | End: 2025-02-13

## 2025-02-03 RX ORDER — GUAIFENESIN 400 MG/1
400 TABLET ORAL 3 TIMES DAILY
Qty: 56 TABLET | Refills: 0 | Status: SHIPPED | OUTPATIENT
Start: 2025-02-03

## 2025-02-03 RX ORDER — OSELTAMIVIR PHOSPHATE 75 MG/1
75 CAPSULE ORAL 2 TIMES DAILY
Qty: 9 CAPSULE | Refills: 0 | Status: SHIPPED | OUTPATIENT
Start: 2025-02-03 | End: 2025-02-08

## 2025-02-03 RX ORDER — AMLODIPINE BESYLATE 5 MG/1
5 TABLET ORAL DAILY
Qty: 30 TABLET | Refills: 3 | Status: SHIPPED | OUTPATIENT
Start: 2025-02-04

## 2025-02-03 RX ORDER — AZITHROMYCIN 500 MG/1
500 TABLET, FILM COATED ORAL DAILY
Qty: 1 PACKET | Refills: 0 | Status: SHIPPED | OUTPATIENT
Start: 2025-02-04 | End: 2025-02-05

## 2025-02-03 RX ADMIN — ENOXAPARIN SODIUM 40 MG: 100 INJECTION SUBCUTANEOUS at 08:34

## 2025-02-03 RX ADMIN — IPRATROPIUM BROMIDE AND ALBUTEROL SULFATE 1 DOSE: 2.5; .5 SOLUTION RESPIRATORY (INHALATION) at 12:16

## 2025-02-03 RX ADMIN — LABETALOL HYDROCHLORIDE 10 MG: 5 INJECTION INTRAVENOUS at 08:48

## 2025-02-03 RX ADMIN — AZITHROMYCIN MONOHYDRATE 500 MG: 500 INJECTION, POWDER, LYOPHILIZED, FOR SOLUTION INTRAVENOUS at 13:16

## 2025-02-03 RX ADMIN — OSELTAMIVIR PHOSPHATE 75 MG: 75 CAPSULE ORAL at 08:34

## 2025-02-03 RX ADMIN — PREDNISONE 20 MG: 20 TABLET ORAL at 08:34

## 2025-02-03 RX ADMIN — HYDRALAZINE HYDROCHLORIDE 10 MG: 20 INJECTION INTRAMUSCULAR; INTRAVENOUS at 05:02

## 2025-02-03 RX ADMIN — SODIUM CHLORIDE, PRESERVATIVE FREE 10 ML: 5 INJECTION INTRAVENOUS at 08:35

## 2025-02-03 RX ADMIN — GUAIFENESIN 400 MG: 400 TABLET ORAL at 08:34

## 2025-02-03 RX ADMIN — SODIUM CHLORIDE, PRESERVATIVE FREE 10 ML: 5 INJECTION INTRAVENOUS at 05:02

## 2025-02-03 RX ADMIN — IPRATROPIUM BROMIDE AND ALBUTEROL SULFATE 1 DOSE: 2.5; .5 SOLUTION RESPIRATORY (INHALATION) at 08:57

## 2025-02-03 RX ADMIN — BUDESONIDE INHALATION 500 MCG: 0.5 SUSPENSION RESPIRATORY (INHALATION) at 08:57

## 2025-02-03 RX ADMIN — ARFORMOTEROL TARTRATE 15 MCG: 15 SOLUTION RESPIRATORY (INHALATION) at 08:57

## 2025-02-03 RX ADMIN — AMLODIPINE BESYLATE 5 MG: 5 TABLET ORAL at 08:34

## 2025-02-03 NOTE — PROGRESS NOTES
2/3/2025 1841 - [facility] called regarding question about Zithromax. That is why this nurse was in the chart.

## 2025-02-03 NOTE — DISCHARGE SUMMARY
[facility]   Discharge summary   Patient ID:  Frank Niño  [id number]  69 y.o.  1955    Admit date: 1/31/2025    Discharge date and time: 2/3/2025    Admission Diagnoses:   Patient Active Problem List   Diagnosis    Acute hypoxic respiratory failure       Discharge Diagnoses:   Patient Active Problem List   Diagnosis    Acute hypoxic respiratory failure       Consults: Pulmonology    Procedures: None    Hospital Course: The patient is a 69 y.o. male of Royce Dowell MD with significant past medical history of COPD.   Mr. Niño presented to [facility] ED on 01/31/2025 with complaints of worsening dyspnea over the past week with cough.  EMS was summoned and upon their arrival his SaO2 reportedly was 85% on RA for which she was placed on 6 L NC and received DuoNeb and route with repeat SaO2 96%.  Upon arrival to the ED his VS were oral temperature 99.1-131-20-96% RA-192/104.  Respiratory panel positive for influenza A.  WBC 9.6.  H&H 16.6/51.2.  .  K5.2.  BUN/SCr 17/1.1.  NT proBNP 589.  Troponin 27.  CXR unremarkable.  EKG ST with .  He received DuoNeb, magnesium sulfate 2 g IV.  He was admitted to a telemetry monitored unit for continued management.  On 01/31/2025 after admission to the telemetry monitored unit an RRT was called for concern of stroke.  Upon arrival, he was nonverbal but able to follow commands without neuro deficit.  He was noted to be in respiratory distress.  Of note, he recently received ceftriaxone.  He was evaluated by Dr. Ferguson (Neurology) with no need for CT of the head as he returned to baseline.  He received methylprednisolone and Decadron.  Given risk for acute decompensation he was transferred to MICU for further management.  On evaluation he is resting comfortably in the ICU setting.  Multiple family members are at bedside.  He denies any acute complaints, he still appears quite dyspneic and acutely ill on my evaluation today.  He is asking about

## 2025-02-03 NOTE — PROGRESS NOTES
Increased oxygen to 4L via nasal canula as patient was observed to have dyspnea upon exertion and at rest. Patient stated that the increase in oxygen was \"much better.\" Pulse ox recorded a reading range of 92%-93%. I elevated patient's head of bead to a high trimble's position. Reassessed pt's O2 level and obtained a reading of 97% with nasal canula on, and with patient in a high trimble's position. Patient is observed resting in bed. Respirations currently 18

## 2025-02-03 NOTE — PROGRESS NOTES
Occupational Therapy    OCCUPATIONAL THERAPY INITIAL EVALUATION    [facility]  [address], [place]        Date:2/3/2025                                                  Patient Name: Frank Niño    MRN: [id number]    : 1955    Room: [address] [place] [address]          Evaluating OT: Ivette Virk, MARTÍND, OTR/L; [id number]      Occupational therapy physician order:   Start   Ordering Provider    25 1300  OT eval and treat  Start:  25 1300,   End:  25 1300,   ONE TIME,   Standing Count:  1 Occurrences,   R         Bebee, Bouchra, APRN - CNP          Pt presents to ED with hypoxia       Diagnosis:    1. Acute respiratory failure with hypoxia    2. COPD exacerbation (HCC)       Patient Active Problem List   Diagnosis    Acute hypoxic respiratory failure          Pertinent Medical History: No past medical history on file.       Surgery/Procedures: none this admission        Recommended Adaptive Equipment: TBD        Precautions:  Fall Risk, O2, monitor Ow     Assessment of current deficits    [x] Functional mobility  [x]ADLs  [x] Strength               [x]Cognition    [x] Functional transfers   [x] IADLs         [x] Safety Awareness   [x]Endurance    [x] Fine Coordination              [x] Balance      [] Vision/perception   []Sensation     []Gross Motor Coordination  [] ROM  [] Delirium                   [] Motor Control     OT PLAN OF CARE   OT POC based on physician orders, patient diagnosis and results of clinical assessment    Frequency/Duration 1-3 days/wk for 2 weeks PRN   Specific OT Treatment Interventions to include:   * Instruction/training on adapted ADL techniques and AE recommendations to increase functional independence within precautions       * Training on energy conservation strategies, correct breathing pattern and techniques to improve independence/tolerance for self-care routine  * Functional transfer/mobility  independence with completion of ADL tasks and functional mobility for improved quality of life and return to PLOF.       Treatment: OT treatment provided this date includes:   ADL-  Instruction/training on safety and adapted techniques for completion of ADLs: Therapist facilitated & pt educated on activity modifications/adaptations to promote implementation of fall prevention strategies, EC/WS strategies, & safety awareness throughout ADLs.   Mobility-  Instruction/training on safety and improved independence with bed mobility/functional transfers and functional mobility.   Activity tolerance- Instruction/training on energy conservation/work simplification for completion of ADLs:.     Neuromuscular Reeducation to facilitate balance/righting reactions for increased function with ADLs tasks:    Skilled positioning/alignment-  Therapist facilitated proper positioning/alignment throughout session to maintain skin/joint integrity & proper body mechanics.   Skilled monitoring of vitals- To maximize safe participation throughout functional activities.     Pt appeared to have tolerated session well and appears motivated/cooperative/pleasant. Pt instructed on use of call light for assistance and fall prevention. Pt demo'ing  understanding of education provided. Continue to educate.     Rehab Potential: Good  for established goals     LTG: maximize independence and safety with ADLs to return to PLOF    Patient and/or family were instructed on functional diagnosis, prognosis/goals and OT plan of care. Demonstrated good understanding.      Eval Complexity:      Description  Performance deficits  Clinical decision making  Co-morbidities affecting occupational performance  Modification or assistance to complete eval    Low Complexity   1 to 3 [x]  Low [x]  None []  None []   Moderate Complexity   3 to 5 []  Mod []  Maybe []  Min to Mod [x]   High Complexity   5 or more []  High []  Yes [x]  Max []     The above evaluation is

## 2025-02-03 NOTE — PROGRESS NOTES
gait without assistive device  *Skillful positioning in bed to protect skin/joint integrity.  *Vitals and symptoms were closely monitored throughout session.    Pt's/ family goals      Return Home    Prognosis is good  for reaching above PT goals.    Patient and or family understand(s) diagnosis, prognosis, and plan of care.  yes    PHYSICAL THERAPY PLAN OF CARE:    PT POC is established based on physician order and patient diagnosis     Diagnosis:  COPD exacerbation (HCC) [J44.1]  Acute respiratory failure with hypoxia [J96.01]  Acute hypoxic respiratory failure [J96.01]  Specific instructions for next treatment:  Increase ambulation distance  Current Treatment Recommendations:     [x] Strengthening to improve independence with functional mobility   [x] ROM to improve independence with functional mobility   [x] Balance Training to improve static/dynamic balance and to reduce fall risk  [x] Endurance Training to improve activity tolerance during functional mobility   [x] Transfer Training to improve safety and independence with all functional transfers   [x] Gait Training to improve gait mechanics, endurance and asses need for appropriate assistive device when appropriate.   [x] Stair Training in preparation for safe discharge home and/or into the community when appropriate  [] Positioning to prevent skin breakdown and contractures  [x] Safety and Education Training   [] Patient/Caregiver Education   [] HEP  [] Gait Team to be added to POC  [] Other     PT long term treatment goals are located in above grid    Frequency of treatments: 2-5x/week x 1-2 weeks.    Time in  1030  Time out  1055    Total Treatment Time  10 minutes     Evaluation Time includes thorough review of current medical information, gathering information on past medical history/social history and prior level of function, completion of standardized testing/informal observation of tasks, assessment of data and education on plan of care and  goals.    CPT codes:  [x] Low Complexity PT evaluation 07812  [] Moderate Complexity PT evaluation 98626  [] High Complexity PT evaluation 66130  [] PT Re-evaluation 31027  [] Gait training 02800 - minutes  [] Manual therapy 88200  minutes  [x] Therapeutic activities 25973 -10 minutes  [] Therapeutic exercises 34829 - minutes  [] Neuromuscular reeducation 45503 minutes     Torito Smith, PT [id number]

## 2025-02-03 NOTE — PLAN OF CARE
Problem: Discharge Planning  Goal: Discharge to home or other facility with appropriate resources  Outcome: Progressing     Problem: Skin/Tissue Integrity  Goal: Skin integrity remains intact  Description: 1.  Monitor for areas of redness and/or skin breakdown  2.  Assess vascular access sites hourly  3.  Every 4-6 hours minimum:  Change oxygen saturation probe site  4.  Every 4-6 hours:  If on nasal continuous positive airway pressure, respiratory therapy assess nares and determine need for appliance change or resting period  Outcome: Progressing     Problem: Neurosensory - Adult  Goal: Achieves stable or improved neurological status  Outcome: Progressing     Problem: Respiratory - Adult  Goal: Achieves optimal ventilation and oxygenation  Outcome: Progressing     Problem: Cardiovascular - Adult  Goal: Absence of cardiac dysrhythmias or at baseline  Outcome: Progressing     Problem: Skin/Tissue Integrity - Adult  Goal: Skin integrity remains intact  Description: 1.  Monitor for areas of redness and/or skin breakdown  2.  Assess vascular access sites hourly  3.  Every 4-6 hours minimum:  Change oxygen saturation probe site  4.  Every 4-6 hours:  If on nasal continuous positive airway pressure, respiratory therapy assess nares and determine need for appliance change or resting period  Outcome: Progressing     Problem: Pain  Goal: Verbalizes/displays adequate comfort level or baseline comfort level  Outcome: Progressing     Problem: Safety - Adult  Goal: Free from fall injury  Outcome: Progressing

## 2025-02-03 NOTE — CARE COORDINATION
2/3. Met with the pt and his wife at the bedside to discuss transition of care. The pt lives with his spouse and is independent. He was admitted to the hospital with COPD exacerbation/Acute Respiratory failure. The pt does not use supplemental O2 at home. They are agreeable to [facility]. Referral made. He will return home when medically stable. His wife will provide transportation.  Marifer Palma RN    PULSE OX ___94_% ROOM AIR SITTING   **IF 88% OR BELOW DO NOT NEED ROOM AIR AMBULATION    PULSE OX __81__% ROOM AIR AMBULATING    PULSE OX ___92_% ON _2__L AMBULATING     PULSE OX ___92_% ON __2_L SITTING (RECOVERY)     Case Management Assessment  Initial Evaluation    Date/Time of Evaluation: 2/3/2025 1:30 PM  Assessment Completed by: Marifer Palma RN    If patient is discharged prior to next notation, then this note serves as note for discharge by case management.    Patient Name: Frank Niño                   YOB: 1955  Diagnosis: COPD exacerbation (HCC) [J44.1]  Acute respiratory failure with hypoxia [J96.01]  Acute hypoxic respiratory failure [J96.01]                   Date / Time: 1/31/2025  7:53 AM    Patient Admission Status: Inpatient   Readmission Risk (Low < 19, Mod (19-27), High > 27): Readmission Risk Score: 9.1    Current PCP: Royce Dowell MD  PCP verified by ? Yes (Dr Dowell)    Chart Reviewed: Yes      History Provided by: Patient, Spouse  Patient Orientation: Alert and Oriented    Patient Cognition: Alert    Hospitalization in the last 30 days (Readmission):  No    If yes, Readmission Assessment in  Navigator will be completed.    Advance Directives:      Code Status: Full Code   Patient's Primary Decision Maker is: Legal Next of Kin      Discharge Planning:    Patient lives with: Spouse/Significant Other Type of Home: House  Primary Care Giver: Self  Patient Support Systems include: Spouse/Significant Other, Children, Family Members   Current Financial

## 2025-02-04 LAB
EKG ATRIAL RATE: 116 BPM
EKG P AXIS: 87 DEGREES
EKG P-R INTERVAL: 146 MS
EKG Q-T INTERVAL: 340 MS
EKG QRS DURATION: 86 MS
EKG QTC CALCULATION (BAZETT): 472 MS
EKG R AXIS: 84 DEGREES
EKG T AXIS: 92 DEGREES
EKG VENTRICULAR RATE: 116 BPM

## 2025-02-06 LAB
MICROORGANISM SPEC CULT: NORMAL
SERVICE CMNT-IMP: NORMAL
SPECIMEN DESCRIPTION: NORMAL

## 2025-02-10 NOTE — PROGRESS NOTES
Physician Progress Note      PATIENT:               RASHEED GALEANA  CSN #:                  [id number]  :                       1955  ADMIT DATE:       2025 7:53 AM  DISCH DATE:        2/3/2025 4:53 PM  RESPONDING  PROVIDER #:        Pam Shafer MD          QUERY TEXT:    Patient was noted to have axillary temperature 100.7 on  at 1101, HR   123-131, RR 26-30, acute respiratory failure. Patient was diagnosed with   Influenza A and treated with IV Steroids, Tamiflu. Based on clinical   indicators and treatment, can the patient's condition be further specified as:    The medical record reflects the following:  Risk Factors: Flu  Clinical Indicators: Temp: 100.7 on , -131, RR 26-30.  WBC: 14.8.  Treatment:  IV Steroids, Tamiflu  Options provided:  -- Sepsis secondary to Influenza A present on admission  -- Sepsis secondary to Influenza A developed after admission.  -- Influenza A without Sepsis  -- Other - I will add my own diagnosis  -- Disagree - Not applicable / Not valid  -- Disagree - Clinically unable to determine / Unknown  -- Refer to Clinical Documentation Reviewer    PROVIDER RESPONSE TEXT:    This patient has Sepsis secondary to Influenza A present on admission.    Query created by: Erendira Daley on 2025 11:07 AM      Electronically signed by:  Pam Shafer MD 2/10/2025 4:21 PM

## 2025-03-07 ENCOUNTER — HOSPITAL ENCOUNTER (OUTPATIENT)
Dept: GENERAL RADIOLOGY | Age: 70
Discharge: HOME | End: 2025-03-09
Payer: MEDICARE

## 2025-03-07 ENCOUNTER — HOSPITAL ENCOUNTER (OUTPATIENT)
Age: 70
Discharge: HOME | End: 2025-03-09
Payer: MEDICARE

## 2025-03-07 DIAGNOSIS — J44.1 OBSTRUCTIVE CHRONIC BRONCHITIS WITH EXACERBATION (HCC): ICD-10-CM

## 2025-03-07 PROCEDURE — 71046 X-RAY EXAM CHEST 2 VIEWS: CPT

## 2025-03-11 ENCOUNTER — TRANSCRIBE ORDERS (OUTPATIENT)
Dept: ADMINISTRATIVE | Age: 70
End: 2025-03-11

## 2025-03-11 DIAGNOSIS — J43.9 PULMONARY EMPHYSEMA, UNSPECIFIED EMPHYSEMA TYPE (HCC): Primary | ICD-10-CM

## 2025-03-11 DIAGNOSIS — J69.0 ASPIRATION PNEUMONIA OF RIGHT LUNG, UNSPECIFIED ASPIRATION PNEUMONIA TYPE, UNSPECIFIED PART OF LUNG (HCC): ICD-10-CM

## 2025-06-12 ENCOUNTER — OUTSIDE SERVICES (OUTPATIENT)
Dept: PRIMARY CARE CLINIC | Age: 70
End: 2025-06-12

## 2025-06-12 DIAGNOSIS — J96.11 CHRONIC RESPIRATORY FAILURE WITH HYPOXIA AND HYPERCAPNIA (HCC): Primary | ICD-10-CM

## 2025-06-12 DIAGNOSIS — I82.413 ACUTE BILATERAL DEEP VEIN THROMBOSIS (DVT) OF FEMORAL VEINS (HCC): ICD-10-CM

## 2025-06-12 DIAGNOSIS — R91.8 RIGHT LOWER LOBE LUNG MASS: ICD-10-CM

## 2025-06-12 DIAGNOSIS — C34.31 ADENOCARCINOMA OF LOWER LOBE OF RIGHT LUNG (HCC): ICD-10-CM

## 2025-06-12 DIAGNOSIS — C34.90 PRIMARY MALIGNANT NEOPLASM OF LUNG WITH METASTASIS TO BRAIN (HCC): ICD-10-CM

## 2025-06-12 DIAGNOSIS — Z93.0 TRACHEOSTOMY PRESENT (HCC): ICD-10-CM

## 2025-06-12 DIAGNOSIS — Z99.11 DEPENDENT ON VENTILATOR (HCC): ICD-10-CM

## 2025-06-12 DIAGNOSIS — J96.12 CHRONIC RESPIRATORY FAILURE WITH HYPOXIA AND HYPERCAPNIA (HCC): Primary | ICD-10-CM

## 2025-06-12 DIAGNOSIS — J44.9 STAGE 4 VERY SEVERE COPD BY GOLD CLASSIFICATION (HCC): ICD-10-CM

## 2025-06-12 DIAGNOSIS — C79.31 PRIMARY MALIGNANT NEOPLASM OF LUNG WITH METASTASIS TO BRAIN (HCC): ICD-10-CM

## 2025-06-12 NOTE — PROGRESS NOTES
Component Value Date     (L) 06/10/2025    K 5.0 06/10/2025    CL 92 (L) 06/10/2025    CO2 31 (H) 06/10/2025    BUN 29 (H) 06/10/2025    CREATININE 1.0 06/10/2025    GLUCOSE 94 06/10/2025    CALCIUM 8.4 (L) 06/10/2025    BILITOT 0.3 06/01/2025    ALKPHOS 108 06/01/2025    AST 29 06/01/2025    ALT 22 06/01/2025    LABGLOM 83 06/10/2025      No results found for: \"LABA1C\"  No results found for: \"CHOL\"  No results found for: \"TRIG\"  No results found for: \"HDL\"  No components found for: \"LDLCHOLESTEROL\", \"LDLCALC\"  No results found for: \"VLDL\"  No results found for: \"CHOLHDLRATIO\"  No results found for: \"TSH\", \"TSHFT4\", \"TSHELE\", \"VUY6ZMA\", \"TSHHS\"         Assessment & Plan   ASSESSMENT/PLAN:  1. Chronic respiratory failure with hypoxia and hypercapnia (HCC)  2. Right lower lobe lung mass  3. Adenocarcinoma of lower lobe of right lung (HCC)  4. Dependent on ventilator (HCC)  5. Tracheostomy present (HCC)  6. Stage 4 very severe COPD by GOLD classification (HCC)  7. 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 Acute bilateral deep vein thrombosis (DVT) of femoral veins (HCC)       Hospital records reviewed  Medication reviewed  Vitals reviewed  Continue bronchodilators and tracheal hygiene  PT OT and respiratory therapy and speech therapy  Monitor nutrition  Per family further treatment for lung cancer and brain mets is postponed for now.  Patient evaluated.  45 minute face to face evaluation, hospital chart review, and discussion with the patient and medical staff and documentation.          An electronic signature was used to authenticate this note.    --Rona Biggs MD

## 2025-06-13 ASSESSMENT — ENCOUNTER SYMPTOMS
ABDOMINAL PAIN: 0
EYE REDNESS: 0
EYE ITCHING: 0
WHEEZING: 0
SHORTNESS OF BREATH: 0
CHEST TIGHTNESS: 0
VOMITING: 0
NAUSEA: 0

## 2025-06-15 ENCOUNTER — APPOINTMENT (OUTPATIENT)
Dept: GENERAL RADIOLOGY | Age: 70
DRG: 870 | End: 2025-06-15
Payer: MEDICARE

## 2025-06-15 ENCOUNTER — HOSPITAL ENCOUNTER (INPATIENT)
Age: 70
LOS: 10 days | Discharge: NURSING, REHAB OR LONG-TERM CARE | DRG: 870 | End: 2025-06-25
Attending: EMERGENCY MEDICINE | Admitting: FAMILY MEDICINE
Payer: MEDICARE

## 2025-06-15 ENCOUNTER — APPOINTMENT (OUTPATIENT)
Dept: CT IMAGING | Age: 70
DRG: 870 | End: 2025-06-15
Payer: MEDICARE

## 2025-06-15 DIAGNOSIS — I50.813 ACUTE ON CHRONIC RIGHT-SIDED CONGESTIVE HEART FAILURE (HCC): ICD-10-CM

## 2025-06-15 DIAGNOSIS — M79.89 LEFT ARM SWELLING: ICD-10-CM

## 2025-06-15 DIAGNOSIS — R65.20 SEPSIS WITH ACUTE HYPOXIC RESPIRATORY FAILURE, DUE TO UNSPECIFIED ORGANISM, UNSPECIFIED WHETHER SEPTIC SHOCK PRESENT (HCC): ICD-10-CM

## 2025-06-15 DIAGNOSIS — A41.9 SEPSIS WITH ACUTE HYPOXIC RESPIRATORY FAILURE, DUE TO UNSPECIFIED ORGANISM, UNSPECIFIED WHETHER SEPTIC SHOCK PRESENT (HCC): ICD-10-CM

## 2025-06-15 DIAGNOSIS — J18.9 PNEUMONIA OF RIGHT LUNG DUE TO INFECTIOUS ORGANISM, UNSPECIFIED PART OF LUNG: Primary | ICD-10-CM

## 2025-06-15 DIAGNOSIS — J96.01 SEPSIS WITH ACUTE HYPOXIC RESPIRATORY FAILURE, DUE TO UNSPECIFIED ORGANISM, UNSPECIFIED WHETHER SEPTIC SHOCK PRESENT (HCC): ICD-10-CM

## 2025-06-15 DIAGNOSIS — J96.01 ACUTE HYPOXIC RESPIRATORY FAILURE (HCC): ICD-10-CM

## 2025-06-15 DIAGNOSIS — J90 PLEURAL EFFUSION ON RIGHT: ICD-10-CM

## 2025-06-15 PROBLEM — R65.21 SEPTIC SHOCK (HCC): Status: ACTIVE | Noted: 2025-06-15

## 2025-06-15 LAB
AADO2: 551.8 MMHG
AADO2: 569.7 MMHG
AADO2: 577.8 MMHG
ALBUMIN SERPL-MCNC: 2.8 G/DL (ref 3.5–5.2)
ALP SERPL-CCNC: 106 U/L (ref 40–129)
ALT SERPL-CCNC: 25 U/L (ref 0–50)
ANION GAP SERPL CALCULATED.3IONS-SCNC: 15 MMOL/L (ref 7–16)
ANION GAP SERPL CALCULATED.3IONS-SCNC: 16 MMOL/L (ref 7–16)
ANION GAP SERPL CALCULATED.3IONS-SCNC: 16 MMOL/L (ref 7–16)
ANION GAP SERPL CALCULATED.3IONS-SCNC: 17 MMOL/L (ref 7–16)
AST SERPL-CCNC: 20 U/L (ref 0–50)
B PARAP IS1001 DNA NPH QL NAA+NON-PROBE: NOT DETECTED
B PERT DNA SPEC QL NAA+PROBE: NOT DETECTED
B.E.: -0.3 MMOL/L (ref -3–3)
B.E.: -2.4 MMOL/L (ref -3–3)
B.E.: -8.3 MMOL/L (ref -3–3)
BACTERIA URNS QL MICRO: ABNORMAL
BASOPHILS # BLD: 0.23 K/UL (ref 0–0.2)
BASOPHILS # BLD: 0.24 K/UL (ref 0–0.2)
BASOPHILS NFR BLD: 1 % (ref 0–2)
BASOPHILS NFR BLD: 1 % (ref 0–2)
BILIRUB SERPL-MCNC: 0.2 MG/DL (ref 0–1.2)
BILIRUB UR QL STRIP: NEGATIVE
BNP SERPL-MCNC: 1389 PG/ML (ref 0–125)
BUN SERPL-MCNC: 86 MG/DL (ref 8–23)
BUN SERPL-MCNC: 93 MG/DL (ref 8–23)
BUN SERPL-MCNC: 98 MG/DL (ref 8–23)
BUN SERPL-MCNC: 98 MG/DL (ref 8–23)
C PNEUM DNA NPH QL NAA+NON-PROBE: NOT DETECTED
CA-I BLD-SCNC: 1.1 MMOL/L (ref 1.15–1.33)
CALCIUM SERPL-MCNC: 7.4 MG/DL (ref 8.8–10.2)
CALCIUM SERPL-MCNC: 7.8 MG/DL (ref 8.8–10.2)
CALCIUM SERPL-MCNC: 7.8 MG/DL (ref 8.8–10.2)
CALCIUM SERPL-MCNC: 8.2 MG/DL (ref 8.8–10.2)
CASTS #/AREA URNS LPF: ABNORMAL /LPF
CASTS #/AREA URNS LPF: ABNORMAL /LPF
CHLORIDE SERPL-SCNC: 87 MMOL/L (ref 98–107)
CHLORIDE SERPL-SCNC: 88 MMOL/L (ref 98–107)
CHLORIDE SERPL-SCNC: 94 MMOL/L (ref 98–107)
CHLORIDE SERPL-SCNC: 97 MMOL/L (ref 98–107)
CHLORIDE UR-SCNC: <20 MMOL/L
CLARITY UR: CLEAR
CO2 SERPL-SCNC: 20 MMOL/L (ref 22–29)
CO2 SERPL-SCNC: 24 MMOL/L (ref 22–29)
CO2 SERPL-SCNC: 25 MMOL/L (ref 22–29)
CO2 SERPL-SCNC: 25 MMOL/L (ref 22–29)
COHB: 0.3 % (ref 0–1.5)
COLOR UR: YELLOW
CREAT SERPL-MCNC: 4.7 MG/DL (ref 0.7–1.2)
CREAT SERPL-MCNC: 5.2 MG/DL (ref 0.7–1.2)
CREAT SERPL-MCNC: 5.6 MG/DL (ref 0.7–1.2)
CREAT SERPL-MCNC: 5.6 MG/DL (ref 0.7–1.2)
CREAT UR-MCNC: 115 MG/DL (ref 40–278)
CRITICAL: ABNORMAL
CRP SERPL HS-MCNC: 14.7 MG/L (ref 0–5)
DATE ANALYZED: ABNORMAL
DATE OF COLLECTION: ABNORMAL
EKG ATRIAL RATE: 99 BPM
EKG P AXIS: 86 DEGREES
EKG P-R INTERVAL: 122 MS
EKG Q-T INTERVAL: 352 MS
EKG QRS DURATION: 118 MS
EKG QTC CALCULATION (BAZETT): 451 MS
EKG R AXIS: 76 DEGREES
EKG T AXIS: 55 DEGREES
EKG VENTRICULAR RATE: 99 BPM
EOSINOPHIL # BLD: 0 K/UL (ref 0.05–0.5)
EOSINOPHIL # BLD: 0 K/UL (ref 0.05–0.5)
EOSINOPHILS RELATIVE PERCENT: 0 % (ref 0–6)
EOSINOPHILS RELATIVE PERCENT: 0 % (ref 0–6)
ERYTHROCYTE [DISTWIDTH] IN BLOOD BY AUTOMATED COUNT: 13.6 % (ref 11.5–15)
ERYTHROCYTE [DISTWIDTH] IN BLOOD BY AUTOMATED COUNT: 13.8 % (ref 11.5–15)
FIO2: 100 %
FLUAV RNA NPH QL NAA+NON-PROBE: NOT DETECTED
FLUBV RNA NPH QL NAA+NON-PROBE: NOT DETECTED
GFR, ESTIMATED: 10 ML/MIN/1.73M2
GFR, ESTIMATED: 10 ML/MIN/1.73M2
GFR, ESTIMATED: 11 ML/MIN/1.73M2
GFR, ESTIMATED: 13 ML/MIN/1.73M2
GLUCOSE BLD-MCNC: 130 MG/DL (ref 74–99)
GLUCOSE BLD-MCNC: 133 MG/DL (ref 74–99)
GLUCOSE BLD-MCNC: 134 MG/DL (ref 74–99)
GLUCOSE BLD-MCNC: 157 MG/DL (ref 74–99)
GLUCOSE BLD-MCNC: 175 MG/DL (ref 74–99)
GLUCOSE BLD-MCNC: 181 MG/DL (ref 74–99)
GLUCOSE BLD-MCNC: 240 MG/DL (ref 74–99)
GLUCOSE BLD-MCNC: 242 MG/DL (ref 74–99)
GLUCOSE SERPL-MCNC: 150 MG/DL (ref 74–99)
GLUCOSE SERPL-MCNC: 154 MG/DL (ref 74–99)
GLUCOSE SERPL-MCNC: 158 MG/DL (ref 74–99)
GLUCOSE SERPL-MCNC: 185 MG/DL (ref 74–99)
GLUCOSE UR STRIP-MCNC: NEGATIVE MG/DL
HADV DNA NPH QL NAA+NON-PROBE: NOT DETECTED
HCO3: 17.5 MMOL/L (ref 22–26)
HCO3: 23.8 MMOL/L (ref 22–26)
HCO3: 25.9 MMOL/L (ref 22–26)
HCOV 229E RNA NPH QL NAA+NON-PROBE: NOT DETECTED
HCOV HKU1 RNA NPH QL NAA+NON-PROBE: NOT DETECTED
HCOV NL63 RNA NPH QL NAA+NON-PROBE: NOT DETECTED
HCOV OC43 RNA NPH QL NAA+NON-PROBE: NOT DETECTED
HCT VFR BLD AUTO: 33.4 % (ref 37–54)
HCT VFR BLD AUTO: 36 % (ref 37–54)
HGB BLD-MCNC: 10.2 G/DL (ref 12.5–16.5)
HGB BLD-MCNC: 11.5 G/DL (ref 12.5–16.5)
HGB UR QL STRIP.AUTO: NEGATIVE
HHB: 3.1 % (ref 0–5)
HHB: 4 % (ref 0–5)
HHB: 7.9 % (ref 0–5)
HMPV RNA NPH QL NAA+NON-PROBE: NOT DETECTED
HPIV1 RNA NPH QL NAA+NON-PROBE: NOT DETECTED
HPIV2 RNA NPH QL NAA+NON-PROBE: NOT DETECTED
HPIV3 RNA NPH QL NAA+NON-PROBE: NOT DETECTED
HPIV4 RNA NPH QL NAA+NON-PROBE: NOT DETECTED
INR PPP: 1.1
KETONES UR STRIP-MCNC: NEGATIVE MG/DL
LAB: ABNORMAL
LACTATE BLDV-SCNC: 1.2 MMOL/L (ref 0.5–1.9)
LACTATE BLDV-SCNC: 1.7 MMOL/L (ref 0.5–1.9)
LACTATE BLDV-SCNC: 2.4 MMOL/L (ref 0.5–2.2)
LEUKOCYTE ESTERASE UR QL STRIP: NEGATIVE
LYMPHOCYTES NFR BLD: 0 K/UL (ref 1.5–4)
LYMPHOCYTES NFR BLD: 1.44 K/UL (ref 1.5–4)
LYMPHOCYTES RELATIVE PERCENT: 0 % (ref 20–42)
LYMPHOCYTES RELATIVE PERCENT: 5 % (ref 20–42)
Lab: 135
Lab: 1620
Lab: 305
M PNEUMO DNA NPH QL NAA+NON-PROBE: NOT DETECTED
MAGNESIUM SERPL-MCNC: 2.4 MG/DL (ref 1.6–2.4)
MAGNESIUM SERPL-MCNC: 2.8 MG/DL (ref 1.6–2.4)
MCH RBC QN AUTO: 29 PG (ref 26–35)
MCH RBC QN AUTO: 29.2 PG (ref 26–35)
MCHC RBC AUTO-ENTMCNC: 30.5 G/DL (ref 32–34.5)
MCHC RBC AUTO-ENTMCNC: 31.9 G/DL (ref 32–34.5)
MCV RBC AUTO: 91.4 FL (ref 80–99.9)
MCV RBC AUTO: 94.9 FL (ref 80–99.9)
METAMYELOCYTES ABSOLUTE COUNT: 0.23 K/UL (ref 0–0.12)
METAMYELOCYTES: 1 % (ref 0–1)
METHB: 0.3 % (ref 0–1.5)
METHB: 0.3 % (ref 0–1.5)
METHB: 0.5 % (ref 0–1.5)
MODE: ABNORMAL
MONOCYTES NFR BLD: 0.96 K/UL (ref 0.1–0.95)
MONOCYTES NFR BLD: 1.13 K/UL (ref 0.1–0.95)
MONOCYTES NFR BLD: 4 % (ref 2–12)
MONOCYTES NFR BLD: 4 % (ref 2–12)
MYELOCYTES ABSOLUTE COUNT: 1.13 K/UL
MYELOCYTES: 4 %
NEUTROPHILS NFR BLD: 90 % (ref 43–80)
NEUTROPHILS NFR BLD: 90 % (ref 43–80)
NEUTS SEG NFR BLD: 23.69 K/UL (ref 1.8–7.3)
NEUTS SEG NFR BLD: 24.96 K/UL (ref 1.8–7.3)
NITRITE UR QL STRIP: NEGATIVE
O2 SATURATION: 92.1 % (ref 92–98.5)
O2 SATURATION: 96 % (ref 92–98.5)
O2 SATURATION: 96.9 % (ref 92–98.5)
O2HB: 91.5 % (ref 94–97)
O2HB: 95.4 % (ref 94–97)
O2HB: 96.1 % (ref 94–97)
OPERATOR ID: 1893
OPERATOR ID: 7296
OPERATOR ID: 7296
OSMOLALITY UR: 363 MOSM/KG (ref 300–900)
PATIENT TEMP: 37 C
PCO2: 37.4 MMHG (ref 35–45)
PCO2: 46.9 MMHG (ref 35–45)
PCO2: 48.6 MMHG (ref 35–45)
PEEP/CPAP: 6 CMH2O
PEEP/CPAP: 6 CMH2O
PFO2: 0.7 MMHG/%
PFO2: 0.89 MMHG/%
PFO2: 0.98 MMHG/%
PH BLOOD GAS: 7.29 (ref 7.35–7.45)
PH BLOOD GAS: 7.32 (ref 7.35–7.45)
PH BLOOD GAS: 7.34 (ref 7.35–7.45)
PH UR STRIP: 5 [PH] (ref 5–8)
PHOSPHATE SERPL-MCNC: 8.7 MG/DL (ref 2.5–4.5)
PIP: 12 CMH2O
PIP: 12 CMH2O
PLATELET # BLD AUTO: 336 K/UL (ref 130–450)
PLATELET # BLD AUTO: 339 K/UL (ref 130–450)
PMV BLD AUTO: 12.6 FL (ref 7–12)
PMV BLD AUTO: 12.8 FL (ref 7–12)
PO2: 69.7 MMHG (ref 75–100)
PO2: 89.3 MMHG (ref 75–100)
PO2: 97.8 MMHG (ref 75–100)
POTASSIUM SERPL-SCNC: 5.48 MMOL/L (ref 3.5–5)
POTASSIUM SERPL-SCNC: 5.5 MMOL/L (ref 3.5–5.1)
POTASSIUM SERPL-SCNC: 5.8 MMOL/L (ref 3.5–5.1)
POTASSIUM SERPL-SCNC: 6.1 MMOL/L (ref 3.5–5.1)
POTASSIUM SERPL-SCNC: 6.4 MMOL/L (ref 3.5–5.1)
PROCALCITONIN SERPL-MCNC: 0.47 NG/ML (ref 0–0.08)
PROT SERPL-MCNC: 5.6 G/DL (ref 6.4–8.3)
PROT UR STRIP-MCNC: ABNORMAL MG/DL
PROTHROMBIN TIME: 11.8 SEC (ref 9.3–12.4)
RBC # BLD AUTO: 3.52 M/UL (ref 3.8–5.8)
RBC # BLD AUTO: 3.94 M/UL (ref 3.8–5.8)
RBC # BLD: ABNORMAL 10*6/UL
RBC #/AREA URNS HPF: ABNORMAL /HPF
RI(T): 5.91
RI(T): 6.18
RI(T): 8.17
RSV RNA NPH QL NAA+NON-PROBE: NOT DETECTED
RV+EV RNA NPH QL NAA+NON-PROBE: NOT DETECTED
SARS-COV-2 RNA NPH QL NAA+NON-PROBE: NOT DETECTED
SODIUM SERPL-SCNC: 129 MMOL/L (ref 136–145)
SODIUM SERPL-SCNC: 129 MMOL/L (ref 136–145)
SODIUM SERPL-SCNC: 132 MMOL/L (ref 136–145)
SODIUM SERPL-SCNC: 134 MMOL/L (ref 136–145)
SODIUM UR-SCNC: <20 MMOL/L
SOURCE, BLOOD GAS: ABNORMAL
SP GR UR STRIP: 1.02 (ref 1–1.03)
SPECIMEN DESCRIPTION: NORMAL
THB: 11.8 G/DL (ref 11.5–16.5)
THB: 12.1 G/DL (ref 11.5–16.5)
THB: 9.2 G/DL (ref 11.5–16.5)
TIME ANALYZED: 137
TIME ANALYZED: 1625
TIME ANALYZED: 313
TOTAL PROTEIN, URINE: 43 MG/DL (ref 0–12)
TROPONIN I SERPL HS-MCNC: 540 NG/L (ref 0–22)
TROPONIN I SERPL HS-MCNC: 616 NG/L (ref 0–22)
URINE TOTAL PROTEIN CREATININE RATIO: 0.37 (ref 0–0.2)
UROBILINOGEN UR STRIP-ACNC: 0.2 EU/DL (ref 0–1)
WBC #/AREA URNS HPF: ABNORMAL /HPF
WBC OTHER # BLD: 26.4 K/UL (ref 4.5–11.5)
WBC OTHER # BLD: 27.6 K/UL (ref 4.5–11.5)

## 2025-06-15 PROCEDURE — 80053 COMPREHEN METABOLIC PANEL: CPT

## 2025-06-15 PROCEDURE — 2580000003 HC RX 258: Performed by: FAMILY MEDICINE

## 2025-06-15 PROCEDURE — 96375 TX/PRO/DX INJ NEW DRUG ADDON: CPT

## 2025-06-15 PROCEDURE — 6360000002 HC RX W HCPCS: Performed by: INTERNAL MEDICINE

## 2025-06-15 PROCEDURE — 2580000003 HC RX 258: Performed by: INTERNAL MEDICINE

## 2025-06-15 PROCEDURE — 71045 X-RAY EXAM CHEST 1 VIEW: CPT

## 2025-06-15 PROCEDURE — 83935 ASSAY OF URINE OSMOLALITY: CPT

## 2025-06-15 PROCEDURE — 6370000000 HC RX 637 (ALT 250 FOR IP)

## 2025-06-15 PROCEDURE — 99291 CRITICAL CARE FIRST HOUR: CPT | Performed by: INTERNAL MEDICINE

## 2025-06-15 PROCEDURE — 87449 NOS EACH ORGANISM AG IA: CPT

## 2025-06-15 PROCEDURE — 83735 ASSAY OF MAGNESIUM: CPT

## 2025-06-15 PROCEDURE — 87086 URINE CULTURE/COLONY COUNT: CPT

## 2025-06-15 PROCEDURE — 96365 THER/PROPH/DIAG IV INF INIT: CPT

## 2025-06-15 PROCEDURE — 93010 ELECTROCARDIOGRAM REPORT: CPT | Performed by: INTERNAL MEDICINE

## 2025-06-15 PROCEDURE — 2500000003 HC RX 250 WO HCPCS: Performed by: INTERNAL MEDICINE

## 2025-06-15 PROCEDURE — 36620 INSERTION CATHETER ARTERY: CPT

## 2025-06-15 PROCEDURE — 82570 ASSAY OF URINE CREATININE: CPT

## 2025-06-15 PROCEDURE — 82962 GLUCOSE BLOOD TEST: CPT

## 2025-06-15 PROCEDURE — 85025 COMPLETE CBC W/AUTO DIFF WBC: CPT

## 2025-06-15 PROCEDURE — 87899 AGENT NOS ASSAY W/OPTIC: CPT

## 2025-06-15 PROCEDURE — 85610 PROTHROMBIN TIME: CPT

## 2025-06-15 PROCEDURE — 94660 CPAP INITIATION&MGMT: CPT

## 2025-06-15 PROCEDURE — 6360000002 HC RX W HCPCS: Performed by: FAMILY MEDICINE

## 2025-06-15 PROCEDURE — 99285 EMERGENCY DEPT VISIT HI MDM: CPT

## 2025-06-15 PROCEDURE — 82805 BLOOD GASES W/O2 SATURATION: CPT

## 2025-06-15 PROCEDURE — 6370000000 HC RX 637 (ALT 250 FOR IP): Performed by: NURSE PRACTITIONER

## 2025-06-15 PROCEDURE — 87081 CULTURE SCREEN ONLY: CPT

## 2025-06-15 PROCEDURE — 84100 ASSAY OF PHOSPHORUS: CPT

## 2025-06-15 PROCEDURE — 2700000000 HC OXYGEN THERAPY PER DAY

## 2025-06-15 PROCEDURE — 6360000002 HC RX W HCPCS

## 2025-06-15 PROCEDURE — 84300 ASSAY OF URINE SODIUM: CPT

## 2025-06-15 PROCEDURE — 36556 INSERT NON-TUNNEL CV CATH: CPT

## 2025-06-15 PROCEDURE — 36415 COLL VENOUS BLD VENIPUNCTURE: CPT

## 2025-06-15 PROCEDURE — 84484 ASSAY OF TROPONIN QUANT: CPT

## 2025-06-15 PROCEDURE — 80048 BASIC METABOLIC PNL TOTAL CA: CPT

## 2025-06-15 PROCEDURE — 36592 COLLECT BLOOD FROM PICC: CPT

## 2025-06-15 PROCEDURE — 6370000000 HC RX 637 (ALT 250 FOR IP): Performed by: EMERGENCY MEDICINE

## 2025-06-15 PROCEDURE — 81001 URINALYSIS AUTO W/SCOPE: CPT

## 2025-06-15 PROCEDURE — 6360000002 HC RX W HCPCS: Performed by: NURSE PRACTITIONER

## 2025-06-15 PROCEDURE — 2500000003 HC RX 250 WO HCPCS: Performed by: EMERGENCY MEDICINE

## 2025-06-15 PROCEDURE — 82436 ASSAY OF URINE CHLORIDE: CPT

## 2025-06-15 PROCEDURE — 74176 CT ABD & PELVIS W/O CONTRAST: CPT

## 2025-06-15 PROCEDURE — 04HY32Z INSERTION OF MONITORING DEVICE INTO LOWER ARTERY, PERCUTANEOUS APPROACH: ICD-10-PCS | Performed by: INTERNAL MEDICINE

## 2025-06-15 PROCEDURE — 6370000000 HC RX 637 (ALT 250 FOR IP): Performed by: INTERNAL MEDICINE

## 2025-06-15 PROCEDURE — 86140 C-REACTIVE PROTEIN: CPT

## 2025-06-15 PROCEDURE — 84156 ASSAY OF PROTEIN URINE: CPT

## 2025-06-15 PROCEDURE — 37799 UNLISTED PX VASCULAR SURGERY: CPT

## 2025-06-15 PROCEDURE — 96367 TX/PROPH/DG ADDL SEQ IV INF: CPT

## 2025-06-15 PROCEDURE — 84145 PROCALCITONIN (PCT): CPT

## 2025-06-15 PROCEDURE — 2500000003 HC RX 250 WO HCPCS: Performed by: FAMILY MEDICINE

## 2025-06-15 PROCEDURE — 86738 MYCOPLASMA ANTIBODY: CPT

## 2025-06-15 PROCEDURE — 94640 AIRWAY INHALATION TREATMENT: CPT

## 2025-06-15 PROCEDURE — 2000000000 HC ICU R&B

## 2025-06-15 PROCEDURE — 83605 ASSAY OF LACTIC ACID: CPT

## 2025-06-15 PROCEDURE — 0202U NFCT DS 22 TRGT SARS-COV-2: CPT

## 2025-06-15 PROCEDURE — 83880 ASSAY OF NATRIURETIC PEPTIDE: CPT

## 2025-06-15 PROCEDURE — P9047 ALBUMIN (HUMAN), 25%, 50ML: HCPCS | Performed by: INTERNAL MEDICINE

## 2025-06-15 PROCEDURE — 2580000003 HC RX 258

## 2025-06-15 PROCEDURE — 51702 INSERT TEMP BLADDER CATH: CPT

## 2025-06-15 PROCEDURE — 87077 CULTURE AEROBIC IDENTIFY: CPT

## 2025-06-15 PROCEDURE — 82330 ASSAY OF CALCIUM: CPT

## 2025-06-15 PROCEDURE — 6360000002 HC RX W HCPCS: Performed by: EMERGENCY MEDICINE

## 2025-06-15 PROCEDURE — 87070 CULTURE OTHR SPECIMN AEROBIC: CPT

## 2025-06-15 PROCEDURE — 87205 SMEAR GRAM STAIN: CPT

## 2025-06-15 PROCEDURE — 87040 BLOOD CULTURE FOR BACTERIA: CPT

## 2025-06-15 PROCEDURE — 84132 ASSAY OF SERUM POTASSIUM: CPT

## 2025-06-15 PROCEDURE — 93005 ELECTROCARDIOGRAM TRACING: CPT

## 2025-06-15 RX ORDER — ARFORMOTEROL TARTRATE 15 UG/2ML
15 SOLUTION RESPIRATORY (INHALATION)
Status: DISCONTINUED | OUTPATIENT
Start: 2025-06-15 | End: 2025-06-25 | Stop reason: HOSPADM

## 2025-06-15 RX ORDER — DEXTROSE MONOHYDRATE 25 G/50ML
25 INJECTION, SOLUTION INTRAVENOUS ONCE
Status: COMPLETED | OUTPATIENT
Start: 2025-06-15 | End: 2025-06-15

## 2025-06-15 RX ORDER — SODIUM CHLORIDE 0.9 % (FLUSH) 0.9 %
5-40 SYRINGE (ML) INJECTION PRN
Status: DISCONTINUED | OUTPATIENT
Start: 2025-06-15 | End: 2025-06-23

## 2025-06-15 RX ORDER — IPRATROPIUM BROMIDE AND ALBUTEROL SULFATE 2.5; .5 MG/3ML; MG/3ML
3 SOLUTION RESPIRATORY (INHALATION) ONCE
Status: COMPLETED | OUTPATIENT
Start: 2025-06-15 | End: 2025-06-15

## 2025-06-15 RX ORDER — CALCIUM GLUCONATE 20 MG/ML
1000 INJECTION, SOLUTION INTRAVENOUS ONCE
Status: COMPLETED | OUTPATIENT
Start: 2025-06-15 | End: 2025-06-15

## 2025-06-15 RX ORDER — 0.9 % SODIUM CHLORIDE 0.9 %
1000 INTRAVENOUS SOLUTION INTRAVENOUS ONCE
Status: COMPLETED | OUTPATIENT
Start: 2025-06-15 | End: 2025-06-15

## 2025-06-15 RX ORDER — LIDOCAINE HYDROCHLORIDE 10 MG/ML
INJECTION, SOLUTION INFILTRATION; PERINEURAL
Status: COMPLETED
Start: 2025-06-15 | End: 2025-06-15

## 2025-06-15 RX ORDER — NOREPINEPHRINE BITARTRATE 0.06 MG/ML
1-100 INJECTION, SOLUTION INTRAVENOUS CONTINUOUS
Status: DISCONTINUED | OUTPATIENT
Start: 2025-06-15 | End: 2025-06-18

## 2025-06-15 RX ORDER — SODIUM CHLORIDE 9 MG/ML
INJECTION, SOLUTION INTRAVENOUS CONTINUOUS
Status: DISCONTINUED | OUTPATIENT
Start: 2025-06-15 | End: 2025-06-18

## 2025-06-15 RX ORDER — HEPARIN SODIUM 5000 [USP'U]/ML
5000 INJECTION, SOLUTION INTRAVENOUS; SUBCUTANEOUS EVERY 8 HOURS SCHEDULED
Status: DISCONTINUED | OUTPATIENT
Start: 2025-06-15 | End: 2025-06-25 | Stop reason: HOSPADM

## 2025-06-15 RX ORDER — ACETAMINOPHEN 325 MG/1
650 TABLET ORAL EVERY 6 HOURS PRN
Status: DISCONTINUED | OUTPATIENT
Start: 2025-06-15 | End: 2025-06-25 | Stop reason: HOSPADM

## 2025-06-15 RX ORDER — IPRATROPIUM BROMIDE AND ALBUTEROL SULFATE 2.5; .5 MG/3ML; MG/3ML
1 SOLUTION RESPIRATORY (INHALATION)
Status: DISCONTINUED | OUTPATIENT
Start: 2025-06-15 | End: 2025-06-21

## 2025-06-15 RX ORDER — SODIUM CHLORIDE, SODIUM LACTATE, POTASSIUM CHLORIDE, AND CALCIUM CHLORIDE .6; .31; .03; .02 G/100ML; G/100ML; G/100ML; G/100ML
1000 INJECTION, SOLUTION INTRAVENOUS ONCE
Status: COMPLETED | OUTPATIENT
Start: 2025-06-15 | End: 2025-06-15

## 2025-06-15 RX ORDER — SODIUM CHLORIDE 0.9 % (FLUSH) 0.9 %
5-40 SYRINGE (ML) INJECTION EVERY 12 HOURS SCHEDULED
Status: DISCONTINUED | OUTPATIENT
Start: 2025-06-15 | End: 2025-06-23

## 2025-06-15 RX ORDER — BUDESONIDE 0.25 MG/2ML
250 INHALANT ORAL 2 TIMES DAILY
Status: DISCONTINUED | OUTPATIENT
Start: 2025-06-15 | End: 2025-06-25 | Stop reason: HOSPADM

## 2025-06-15 RX ORDER — ALBUMIN (HUMAN) 12.5 G/50ML
25 SOLUTION INTRAVENOUS EVERY 6 HOURS
Status: COMPLETED | OUTPATIENT
Start: 2025-06-15 | End: 2025-06-17

## 2025-06-15 RX ORDER — ONDANSETRON 4 MG/1
4 TABLET, ORALLY DISINTEGRATING ORAL EVERY 8 HOURS PRN
Status: DISCONTINUED | OUTPATIENT
Start: 2025-06-15 | End: 2025-06-25 | Stop reason: HOSPADM

## 2025-06-15 RX ORDER — 0.9 % SODIUM CHLORIDE 0.9 %
500 INTRAVENOUS SOLUTION INTRAVENOUS ONCE
Status: COMPLETED | OUTPATIENT
Start: 2025-06-15 | End: 2025-06-15

## 2025-06-15 RX ORDER — ONDANSETRON 2 MG/ML
4 INJECTION INTRAMUSCULAR; INTRAVENOUS EVERY 6 HOURS PRN
Status: DISCONTINUED | OUTPATIENT
Start: 2025-06-15 | End: 2025-06-25 | Stop reason: HOSPADM

## 2025-06-15 RX ORDER — POLYETHYLENE GLYCOL 3350 17 G/17G
17 POWDER, FOR SOLUTION ORAL DAILY PRN
Status: DISCONTINUED | OUTPATIENT
Start: 2025-06-15 | End: 2025-06-25 | Stop reason: HOSPADM

## 2025-06-15 RX ORDER — SODIUM CHLORIDE 9 MG/ML
INJECTION, SOLUTION INTRAVENOUS PRN
Status: DISCONTINUED | OUTPATIENT
Start: 2025-06-15 | End: 2025-06-23

## 2025-06-15 RX ORDER — ACETAMINOPHEN 650 MG/1
650 SUPPOSITORY RECTAL EVERY 6 HOURS PRN
Status: DISCONTINUED | OUTPATIENT
Start: 2025-06-15 | End: 2025-06-25 | Stop reason: HOSPADM

## 2025-06-15 RX ADMIN — HYDROCORTISONE SODIUM SUCCINATE 100 MG: 100 INJECTION INTRAMUSCULAR; INTRAVENOUS at 11:38

## 2025-06-15 RX ADMIN — SODIUM ZIRCONIUM CYCLOSILICATE 10 G: 10 POWDER, FOR SUSPENSION ORAL at 21:15

## 2025-06-15 RX ADMIN — PIPERACILLIN AND TAZOBACTAM 4500 MG: 4; .5 INJECTION, POWDER, FOR SOLUTION INTRAVENOUS at 11:40

## 2025-06-15 RX ADMIN — HYDROCORTISONE SODIUM SUCCINATE 100 MG: 100 INJECTION INTRAMUSCULAR; INTRAVENOUS at 18:02

## 2025-06-15 RX ADMIN — IPRATROPIUM BROMIDE AND ALBUTEROL SULFATE 3 DOSE: .5; 3 SOLUTION RESPIRATORY (INHALATION) at 01:15

## 2025-06-15 RX ADMIN — Medication 5 MCG/MIN: at 04:50

## 2025-06-15 RX ADMIN — DEXTROSE MONOHYDRATE 25 G: 25 INJECTION, SOLUTION INTRAVENOUS at 04:20

## 2025-06-15 RX ADMIN — SODIUM CHLORIDE 1000 ML: 0.9 INJECTION, SOLUTION INTRAVENOUS at 02:59

## 2025-06-15 RX ADMIN — INSULIN HUMAN 10 UNITS: 100 INJECTION, SOLUTION PARENTERAL at 20:27

## 2025-06-15 RX ADMIN — ARFORMOTEROL TARTRATE 15 MCG: 15 SOLUTION RESPIRATORY (INHALATION) at 20:16

## 2025-06-15 RX ADMIN — SODIUM CHLORIDE 1500 MG: 0.9 INJECTION, SOLUTION INTRAVENOUS at 05:34

## 2025-06-15 RX ADMIN — PETROLATUM: 420 OINTMENT TOPICAL at 20:27

## 2025-06-15 RX ADMIN — SODIUM CHLORIDE: 0.9 INJECTION, SOLUTION INTRAVENOUS at 16:49

## 2025-06-15 RX ADMIN — SODIUM CHLORIDE 500 ML: 0.9 INJECTION, SOLUTION INTRAVENOUS at 15:43

## 2025-06-15 RX ADMIN — VASOPRESSIN 0.03 UNITS/MIN: 20 INJECTION INTRAVENOUS at 18:05

## 2025-06-15 RX ADMIN — ARFORMOTEROL TARTRATE 15 MCG: 15 SOLUTION RESPIRATORY (INHALATION) at 12:36

## 2025-06-15 RX ADMIN — VASOPRESSIN 0.03 UNITS/MIN: 20 INJECTION INTRAVENOUS at 11:00

## 2025-06-15 RX ADMIN — IPRATROPIUM BROMIDE AND ALBUTEROL SULFATE 1 DOSE: 2.5; .5 SOLUTION RESPIRATORY (INHALATION) at 12:36

## 2025-06-15 RX ADMIN — IPRATROPIUM BROMIDE AND ALBUTEROL SULFATE 1 DOSE: 2.5; .5 SOLUTION RESPIRATORY (INHALATION) at 16:31

## 2025-06-15 RX ADMIN — SODIUM CHLORIDE, PRESERVATIVE FREE 10 ML: 5 INJECTION INTRAVENOUS at 20:35

## 2025-06-15 RX ADMIN — Medication 20 MCG/MIN: at 16:12

## 2025-06-15 RX ADMIN — CALCIUM GLUCONATE 1000 MG: 20 INJECTION, SOLUTION INTRAVENOUS at 03:03

## 2025-06-15 RX ADMIN — PIPERACILLIN AND TAZOBACTAM 4500 MG: 4; .5 INJECTION, POWDER, FOR SOLUTION INTRAVENOUS at 23:20

## 2025-06-15 RX ADMIN — HEPARIN SODIUM 5000 UNITS: 5000 INJECTION INTRAVENOUS; SUBCUTANEOUS at 21:15

## 2025-06-15 RX ADMIN — LIDOCAINE HYDROCHLORIDE 200 MG: 10 INJECTION, SOLUTION INFILTRATION; PERINEURAL at 15:30

## 2025-06-15 RX ADMIN — HEPARIN SODIUM 5000 UNITS: 5000 INJECTION INTRAVENOUS; SUBCUTANEOUS at 07:29

## 2025-06-15 RX ADMIN — IPRATROPIUM BROMIDE AND ALBUTEROL SULFATE 1 DOSE: 2.5; .5 SOLUTION RESPIRATORY (INHALATION) at 20:16

## 2025-06-15 RX ADMIN — CALCIUM GLUCONATE 1000 MG: 20 INJECTION, SOLUTION INTRAVENOUS at 15:42

## 2025-06-15 RX ADMIN — ALBUMIN (HUMAN) 25 G: 0.25 INJECTION, SOLUTION INTRAVENOUS at 16:13

## 2025-06-15 RX ADMIN — ALBUMIN (HUMAN) 25 G: 0.25 INJECTION, SOLUTION INTRAVENOUS at 20:30

## 2025-06-15 RX ADMIN — SODIUM CHLORIDE: 0.9 INJECTION, SOLUTION INTRAVENOUS at 20:31

## 2025-06-15 RX ADMIN — INSULIN HUMAN 10 UNITS: 100 INJECTION, SOLUTION PARENTERAL at 04:21

## 2025-06-15 RX ADMIN — DEXTROSE 250 ML: 10 SOLUTION INTRAVENOUS at 15:58

## 2025-06-15 RX ADMIN — BUDESONIDE 250 MCG: 0.25 SUSPENSION RESPIRATORY (INHALATION) at 20:16

## 2025-06-15 RX ADMIN — DEXTROSE 250 ML: 10 SOLUTION INTRAVENOUS at 20:35

## 2025-06-15 RX ADMIN — INSULIN HUMAN 10 UNITS: 100 INJECTION, SOLUTION PARENTERAL at 15:56

## 2025-06-15 RX ADMIN — SODIUM CHLORIDE, SODIUM LACTATE, POTASSIUM CHLORIDE, AND CALCIUM CHLORIDE 1000 ML: .6; .31; .03; .02 INJECTION, SOLUTION INTRAVENOUS at 13:06

## 2025-06-15 RX ADMIN — PIPERACILLIN AND TAZOBACTAM 4500 MG: 4; .5 INJECTION, POWDER, FOR SOLUTION INTRAVENOUS at 04:23

## 2025-06-15 RX ADMIN — CALCIUM GLUCONATE 1000 MG: 20 INJECTION, SOLUTION INTRAVENOUS at 20:41

## 2025-06-15 RX ADMIN — BUDESONIDE 250 MCG: 0.25 SUSPENSION RESPIRATORY (INHALATION) at 12:36

## 2025-06-15 RX ADMIN — SODIUM CHLORIDE 1000 ML: 9 INJECTION, SOLUTION INTRAVENOUS at 01:47

## 2025-06-15 ASSESSMENT — LIFESTYLE VARIABLES: HOW OFTEN DO YOU HAVE A DRINK CONTAINING ALCOHOL: NEVER

## 2025-06-15 ASSESSMENT — PAIN SCALES - GENERAL
PAINLEVEL_OUTOF10: 0
PAINLEVEL_OUTOF10: 0

## 2025-06-15 NOTE — ED NOTES
Report given to SEBAS mcguire from 44.   Report method by phone   The following was reviewed with receiving RN:   Current vital signs:  /62 Comment: Simultaneous filing. User may not have seen previous data.  Pulse 89 Comment: Simultaneous filing. User may not have seen previous data.  Temp 97.6 °F (36.4 °C) (Oral)   Resp 15 Comment: Simultaneous filing. User may not have seen previous data.  Wt 76.2 kg (168 lb)   SpO2 95% Comment: Simultaneous filing. User may not have seen previous data.  BMI 24.11 kg/m²                      Any medication or safety alerts were reviewed. Any pending diagnostics and notifications were also reviewed, as well as any safety concerns or issues, abnormal labs, abnormal imaging, and abnormal assessment findings. Questions were answered.

## 2025-06-15 NOTE — H&P
Hospitalist History & Physical      PCP: No primary care provider on file.    Date of Service:  6/15/2025     Chief Complaint:  had concerns including Respiratory Distress (From NH increased sob 75% on 10L HF at facility arrived on c-pap. Current ATB use. Capped trach ).    History Of Present Illness:    Mr. Frank Niño, a 69 y.o. year old male  who  has no past medical history on file.     Patient arrived to the emergency department from local nursing home with respiratory distress.  Patient noted to be 75% on 10 L high flow at the facility.  Patient has a trach in place.  Vital signs show the patient be hypotensive with pressures as low as 61/38.  He was started on a Levophed infusion.  Patient is tachycardic with a rate in the 110s.  Laboratory studies demonstrate potassium 5.8, sodium 129, creatinine 5.6, BUN 98, troponin 540, glucose 181, WBC 27.6.  Imaging noted a right basilar opacity.  Patient was started on vancomycin and Zosyn.  Patient admitted to the ICU      No past medical history on file.    No past surgical history on file.    Prior to Admission medications    Medication Sig Start Date End Date Taking? Authorizing Provider   amLODIPine (NORVASC) 5 MG tablet Take 1 tablet by mouth daily 2/4/25   Bouchra Vazquez APRN - CNP   guaiFENesin 400 MG tablet Take 1 tablet by mouth in the morning, at noon, and at bedtime 2/3/25   Bouchra Vazquez APRN - CNP   budesonide-formoterol (SYMBICORT) 160-4.5 MCG/ACT AERO Inhale 2 puffs into the lungs 2 times daily    Raul Orozco MD   tiotropium (SPIRIVA) 18 MCG inhalation capsule Inhale 1 capsule into the lungs daily    Raul Orozco MD   albuterol sulfate  (90 Base) MCG/ACT inhaler Inhale 2 puffs into the lungs every 4 hours as needed for Wheezing or Shortness of Breath    Raul Orozco MD         Allergies:  Rocephin [ceftriaxone] and Shellfish-derived products    Social History:    TOBACCO:   reports that he quit smoking about

## 2025-06-15 NOTE — ED NOTES
Obtained permission from attending doctor Carlos Hunt to trial patient off of the bipap. Respiratory called to the bedside for transition.

## 2025-06-15 NOTE — ED PROVIDER NOTES
[facility] EMERGENCY DEPARTMENT  EMERGENCY DEPARTMENT ENCOUNTER        Pt Name: Frank Niño  MRN: [id number]  Birthdate 1955  Date of evaluation: 6/15/2025  Provider: Mary Montalvo MD  PCP: No primary care provider on file.  Note Started: 1:24 AM EDT 6/15/25    CHIEF COMPLAINT       Chief Complaint   Patient presents with   • Respiratory Distress     From NH increased sob 75% on 10L HF at facility arrived on c-pap. Current ATB use. Capped trach        HISTORY OF PRESENT ILLNESS: 1 or more Elements   History From: Patient    Limitations to history : Respiratory distress    Frank Niño is a 69 y.o. male who presents for respiratory distress. Patient arrived via EMS from [facility] for increased work of breathing and shortness of breath.  Patient's oxygen saturation was 75% on 10L of HF.  Capped trach in place.  Patient was recently started on antibiotics per EMS. Patient reports shortness of breath and upper abdominal pain. Notes some discomfort to the center of his chest as well. BiPAP placed on arrival.     Patient denies fever, headache, vomiting, diarrhea, hematuria, hematochezia, and melena.    Nursing Notes were all reviewed and agreed with or any disagreements were addressed in the HPI.      REVIEW OF EXTERNAL NOTES :         5/2/2025: Patient was admitted for respiratory failure.      REVIEW OF SYSTEMS :       Positives and Pertinent negatives as per HPI.     SURGICAL HISTORY   No past surgical history on file.    CURRENTMEDICATIONS       Previous Medications    ALBUTEROL SULFATE  (90 BASE) MCG/ACT INHALER    Inhale 2 puffs into the lungs every 4 hours as needed for Wheezing or Shortness of Breath    AMLODIPINE (NORVASC) 5 MG TABLET    Take 1 tablet by mouth daily    BUDESONIDE-FORMOTEROL (SYMBICORT) 160-4.5 MCG/ACT AERO    Inhale 2 puffs into the lungs 2 times daily    GUAIFENESIN 400 MG TABLET    Take 1 tablet by mouth in the

## 2025-06-16 LAB
AADO2: 322.7 MMHG
AADO2: 547.6 MMHG
ALBUMIN SERPL-MCNC: 3.3 G/DL (ref 3.5–5.2)
ALP SERPL-CCNC: 59 U/L (ref 40–129)
ALT SERPL-CCNC: 13 U/L (ref 0–50)
ANION GAP SERPL CALCULATED.3IONS-SCNC: 14 MMOL/L (ref 7–16)
ANION GAP SERPL CALCULATED.3IONS-SCNC: 14 MMOL/L (ref 7–16)
ANION GAP SERPL CALCULATED.3IONS-SCNC: 15 MMOL/L (ref 7–16)
AST SERPL-CCNC: 10 U/L (ref 0–50)
B.E.: -4 MMOL/L (ref -3–3)
B.E.: -6.9 MMOL/L (ref -3–3)
BASOPHILS # BLD: 0 K/UL (ref 0–0.2)
BASOPHILS NFR BLD: 0 % (ref 0–2)
BILIRUB SERPL-MCNC: 0.3 MG/DL (ref 0–1.2)
BUN SERPL-MCNC: 75 MG/DL (ref 8–23)
BUN SERPL-MCNC: 81 MG/DL (ref 8–23)
BUN SERPL-MCNC: 86 MG/DL (ref 8–23)
CALCIUM SERPL-MCNC: 7.4 MG/DL (ref 8.8–10.2)
CALCIUM SERPL-MCNC: 7.8 MG/DL (ref 8.8–10.2)
CALCIUM SERPL-MCNC: 7.8 MG/DL (ref 8.8–10.2)
CHLORIDE SERPL-SCNC: 101 MMOL/L (ref 98–107)
CHLORIDE SERPL-SCNC: 96 MMOL/L (ref 98–107)
CHLORIDE SERPL-SCNC: 99 MMOL/L (ref 98–107)
CO2 SERPL-SCNC: 20 MMOL/L (ref 22–29)
CO2 SERPL-SCNC: 22 MMOL/L (ref 22–29)
CO2 SERPL-SCNC: 22 MMOL/L (ref 22–29)
COHB: 0.1 % (ref 0–1.5)
COHB: 0.3 % (ref 0–1.5)
CREAT SERPL-MCNC: 4.1 MG/DL (ref 0.7–1.2)
CREAT SERPL-MCNC: 4.5 MG/DL (ref 0.7–1.2)
CREAT SERPL-MCNC: 4.7 MG/DL (ref 0.7–1.2)
CRITICAL: ABNORMAL
CRITICAL: ABNORMAL
DATE ANALYZED: ABNORMAL
DATE ANALYZED: ABNORMAL
DATE LAST DOSE: NORMAL
DATE OF COLLECTION: ABNORMAL
DATE OF COLLECTION: ABNORMAL
EOSINOPHIL # BLD: 0 K/UL (ref 0.05–0.5)
EOSINOPHILS RELATIVE PERCENT: 0 % (ref 0–6)
ERYTHROCYTE [DISTWIDTH] IN BLOOD BY AUTOMATED COUNT: 13.5 % (ref 11.5–15)
FIO2: 65 %
FIO2: 95 %
GFR, ESTIMATED: 13 ML/MIN/1.73M2
GFR, ESTIMATED: 14 ML/MIN/1.73M2
GFR, ESTIMATED: 15 ML/MIN/1.73M2
GLUCOSE BLD-MCNC: 103 MG/DL (ref 74–99)
GLUCOSE BLD-MCNC: 76 MG/DL (ref 74–99)
GLUCOSE BLD-MCNC: 91 MG/DL (ref 74–99)
GLUCOSE BLD-MCNC: 99 MG/DL (ref 74–99)
GLUCOSE SERPL-MCNC: 164 MG/DL (ref 74–99)
GLUCOSE SERPL-MCNC: 74 MG/DL (ref 74–99)
GLUCOSE SERPL-MCNC: 96 MG/DL (ref 74–99)
HCO3: 19.8 MMOL/L (ref 22–26)
HCO3: 22.8 MMOL/L (ref 22–26)
HCT VFR BLD AUTO: 25.7 % (ref 37–54)
HGB BLD-MCNC: 7.8 G/DL (ref 12.5–16.5)
HHB: 3.7 % (ref 0–5)
HHB: 4.9 % (ref 0–5)
L PNEUMO1 AG UR QL IA.RAPID: NEGATIVE
LAB: ABNORMAL
LAB: ABNORMAL
LYMPHOCYTES NFR BLD: 0.14 K/UL (ref 1.5–4)
LYMPHOCYTES RELATIVE PERCENT: 1 % (ref 20–42)
Lab: 1723
Lab: 413
MAGNESIUM SERPL-MCNC: 2.3 MG/DL (ref 1.6–2.4)
MCH RBC QN AUTO: 28.8 PG (ref 26–35)
MCHC RBC AUTO-ENTMCNC: 30.4 G/DL (ref 32–34.5)
MCV RBC AUTO: 94.8 FL (ref 80–99.9)
METHB: 0.5 % (ref 0–1.5)
METHB: 0.7 % (ref 0–1.5)
MICROORGANISM SPEC CULT: NO GROWTH
MICROORGANISM SPEC CULT: NORMAL
MODE: ABNORMAL
MODE: ABNORMAL
MONOCYTES NFR BLD: 0.57 K/UL (ref 0.1–0.95)
MONOCYTES NFR BLD: 4 % (ref 2–12)
MYCOPLASMA AB,IGM: NORMAL
NEUTROPHILS NFR BLD: 96 % (ref 43–80)
NEUTS SEG NFR BLD: 15.69 K/UL (ref 1.8–7.3)
O2 SATURATION: 95.1 % (ref 92–98.5)
O2 SATURATION: 96.3 % (ref 92–98.5)
O2HB: 94.3 % (ref 94–97)
O2HB: 95.5 % (ref 94–97)
OPERATOR ID: 1868
OPERATOR ID: 8219
PATIENT TEMP: 37 C
PATIENT TEMP: 37 C
PCO2: 45.3 MMHG (ref 35–45)
PCO2: 50 MMHG (ref 35–45)
PFO2: 0.83 MMHG/%
PFO2: 1.41 MMHG/%
PH BLOOD GAS: 7.26 (ref 7.35–7.45)
PH BLOOD GAS: 7.28 (ref 7.35–7.45)
PHOSPHATE SERPL-MCNC: 7.3 MG/DL (ref 2.5–4.5)
PLATELET # BLD AUTO: 238 K/UL (ref 130–450)
PMV BLD AUTO: 12.6 FL (ref 7–12)
PO2: 79.1 MMHG (ref 75–100)
PO2: 91.5 MMHG (ref 75–100)
POTASSIUM SERPL-SCNC: 4.6 MMOL/L (ref 3.5–5.1)
POTASSIUM SERPL-SCNC: 4.8 MMOL/L (ref 3.5–5.1)
POTASSIUM SERPL-SCNC: 5.5 MMOL/L (ref 3.5–5.1)
PROT SERPL-MCNC: 5 G/DL (ref 6.4–8.3)
RBC # BLD AUTO: 2.71 M/UL (ref 3.8–5.8)
RBC # BLD: ABNORMAL 10*6/UL
RBC # BLD: ABNORMAL 10*6/UL
RI(T): 3.53
RI(T): 6.92
S PNEUM AG SPEC QL: NEGATIVE
SERVICE CMNT-IMP: NORMAL
SODIUM SERPL-SCNC: 132 MMOL/L (ref 136–145)
SODIUM SERPL-SCNC: 135 MMOL/L (ref 136–145)
SODIUM SERPL-SCNC: 136 MMOL/L (ref 136–145)
SOURCE, BLOOD GAS: ABNORMAL
SOURCE, BLOOD GAS: ABNORMAL
SPECIMEN DESCRIPTION: NORMAL
SPECIMEN DESCRIPTION: NORMAL
SPECIMEN SOURCE: NORMAL
THB: 8.5 G/DL (ref 11.5–16.5)
THB: 8.8 G/DL (ref 11.5–16.5)
TIME ANALYZED: 1728
TIME ANALYZED: 416
TME LAST DOSE: NORMAL H
VANCOMYCIN DOSE: NORMAL MG
VANCOMYCIN SERPL-MCNC: 16.1 UG/ML (ref 5–40)
WBC OTHER # BLD: 16.4 K/UL (ref 4.5–11.5)

## 2025-06-16 PROCEDURE — 80053 COMPREHEN METABOLIC PANEL: CPT

## 2025-06-16 PROCEDURE — 80048 BASIC METABOLIC PNL TOTAL CA: CPT

## 2025-06-16 PROCEDURE — 6360000002 HC RX W HCPCS: Performed by: INTERNAL MEDICINE

## 2025-06-16 PROCEDURE — 80202 ASSAY OF VANCOMYCIN: CPT

## 2025-06-16 PROCEDURE — 6370000000 HC RX 637 (ALT 250 FOR IP): Performed by: INTERNAL MEDICINE

## 2025-06-16 PROCEDURE — 2500000003 HC RX 250 WO HCPCS: Performed by: EMERGENCY MEDICINE

## 2025-06-16 PROCEDURE — 99232 SBSQ HOSP IP/OBS MODERATE 35: CPT | Performed by: INTERNAL MEDICINE

## 2025-06-16 PROCEDURE — 94660 CPAP INITIATION&MGMT: CPT

## 2025-06-16 PROCEDURE — 82805 BLOOD GASES W/O2 SATURATION: CPT

## 2025-06-16 PROCEDURE — 2500000003 HC RX 250 WO HCPCS: Performed by: INTERNAL MEDICINE

## 2025-06-16 PROCEDURE — 2500000003 HC RX 250 WO HCPCS: Performed by: FAMILY MEDICINE

## 2025-06-16 PROCEDURE — 2000000000 HC ICU R&B

## 2025-06-16 PROCEDURE — 85025 COMPLETE CBC W/AUTO DIFF WBC: CPT

## 2025-06-16 PROCEDURE — 2580000003 HC RX 258: Performed by: FAMILY MEDICINE

## 2025-06-16 PROCEDURE — 87205 SMEAR GRAM STAIN: CPT

## 2025-06-16 PROCEDURE — 6360000002 HC RX W HCPCS: Performed by: FAMILY MEDICINE

## 2025-06-16 PROCEDURE — 2580000003 HC RX 258: Performed by: INTERNAL MEDICINE

## 2025-06-16 PROCEDURE — 84100 ASSAY OF PHOSPHORUS: CPT

## 2025-06-16 PROCEDURE — 83735 ASSAY OF MAGNESIUM: CPT

## 2025-06-16 PROCEDURE — 82962 GLUCOSE BLOOD TEST: CPT

## 2025-06-16 PROCEDURE — P9047 ALBUMIN (HUMAN), 25%, 50ML: HCPCS | Performed by: INTERNAL MEDICINE

## 2025-06-16 PROCEDURE — 87070 CULTURE OTHR SPECIMN AEROBIC: CPT

## 2025-06-16 PROCEDURE — 51702 INSERT TEMP BLADDER CATH: CPT

## 2025-06-16 PROCEDURE — 2700000000 HC OXYGEN THERAPY PER DAY

## 2025-06-16 PROCEDURE — 94640 AIRWAY INHALATION TREATMENT: CPT

## 2025-06-16 PROCEDURE — 6370000000 HC RX 637 (ALT 250 FOR IP): Performed by: NURSE PRACTITIONER

## 2025-06-16 RX ADMIN — HYDROCORTISONE SODIUM SUCCINATE 100 MG: 100 INJECTION INTRAMUSCULAR; INTRAVENOUS at 03:04

## 2025-06-16 RX ADMIN — IPRATROPIUM BROMIDE AND ALBUTEROL SULFATE 1 DOSE: 2.5; .5 SOLUTION RESPIRATORY (INHALATION) at 16:30

## 2025-06-16 RX ADMIN — BUDESONIDE 250 MCG: 0.25 SUSPENSION RESPIRATORY (INHALATION) at 08:46

## 2025-06-16 RX ADMIN — Medication 7 MCG/MIN: at 15:53

## 2025-06-16 RX ADMIN — IPRATROPIUM BROMIDE AND ALBUTEROL SULFATE 1 DOSE: 2.5; .5 SOLUTION RESPIRATORY (INHALATION) at 13:03

## 2025-06-16 RX ADMIN — ALBUMIN (HUMAN) 25 G: 0.25 INJECTION, SOLUTION INTRAVENOUS at 15:12

## 2025-06-16 RX ADMIN — SODIUM CHLORIDE: 0.9 INJECTION, SOLUTION INTRAVENOUS at 02:58

## 2025-06-16 RX ADMIN — ALBUMIN (HUMAN) 25 G: 0.25 INJECTION, SOLUTION INTRAVENOUS at 03:05

## 2025-06-16 RX ADMIN — PETROLATUM: 420 OINTMENT TOPICAL at 21:00

## 2025-06-16 RX ADMIN — HEPARIN SODIUM 5000 UNITS: 5000 INJECTION INTRAVENOUS; SUBCUTANEOUS at 21:51

## 2025-06-16 RX ADMIN — BUDESONIDE 250 MCG: 0.25 SUSPENSION RESPIRATORY (INHALATION) at 20:15

## 2025-06-16 RX ADMIN — ALBUMIN (HUMAN) 25 G: 0.25 INJECTION, SOLUTION INTRAVENOUS at 08:36

## 2025-06-16 RX ADMIN — ALBUMIN (HUMAN) 25 G: 0.25 INJECTION, SOLUTION INTRAVENOUS at 21:53

## 2025-06-16 RX ADMIN — IPRATROPIUM BROMIDE AND ALBUTEROL SULFATE 1 DOSE: 2.5; .5 SOLUTION RESPIRATORY (INHALATION) at 08:46

## 2025-06-16 RX ADMIN — PIPERACILLIN AND TAZOBACTAM 4500 MG: 4; .5 INJECTION, POWDER, FOR SOLUTION INTRAVENOUS at 10:52

## 2025-06-16 RX ADMIN — PETROLATUM: 420 OINTMENT TOPICAL at 08:41

## 2025-06-16 RX ADMIN — SODIUM ZIRCONIUM CYCLOSILICATE 10 G: 10 POWDER, FOR SUSPENSION ORAL at 05:32

## 2025-06-16 RX ADMIN — HEPARIN SODIUM 5000 UNITS: 5000 INJECTION INTRAVENOUS; SUBCUTANEOUS at 05:33

## 2025-06-16 RX ADMIN — ARFORMOTEROL TARTRATE 15 MCG: 15 SOLUTION RESPIRATORY (INHALATION) at 20:15

## 2025-06-16 RX ADMIN — SODIUM CHLORIDE, PRESERVATIVE FREE 40 MG: 5 INJECTION INTRAVENOUS at 12:38

## 2025-06-16 RX ADMIN — IPRATROPIUM BROMIDE AND ALBUTEROL SULFATE 1 DOSE: 2.5; .5 SOLUTION RESPIRATORY (INHALATION) at 20:15

## 2025-06-16 RX ADMIN — SODIUM CHLORIDE: 0.9 INJECTION, SOLUTION INTRAVENOUS at 16:25

## 2025-06-16 RX ADMIN — HYDROCORTISONE SODIUM SUCCINATE 100 MG: 100 INJECTION INTRAMUSCULAR; INTRAVENOUS at 10:40

## 2025-06-16 RX ADMIN — SODIUM CHLORIDE: 0.9 INJECTION, SOLUTION INTRAVENOUS at 09:50

## 2025-06-16 RX ADMIN — HYDROCORTISONE SODIUM SUCCINATE 100 MG: 100 INJECTION INTRAMUSCULAR; INTRAVENOUS at 18:32

## 2025-06-16 RX ADMIN — ARFORMOTEROL TARTRATE 15 MCG: 15 SOLUTION RESPIRATORY (INHALATION) at 08:46

## 2025-06-16 RX ADMIN — SODIUM CHLORIDE, PRESERVATIVE FREE 10 ML: 5 INJECTION INTRAVENOUS at 21:00

## 2025-06-16 ASSESSMENT — PAIN SCALES - GENERAL
PAINLEVEL_OUTOF10: 0

## 2025-06-16 NOTE — CARE COORDINATION
Case Management Assessment  Initial Evaluation    Date/Time of Evaluation: 6/16/2025 3:40 PM  Assessment Completed by: Harika Lindo RN    If patient is discharged prior to next notation, then this note serves as note for discharge by case management.    Patient Name: Frank Niño                   YOB: 1955  Diagnosis: Pleural effusion on right [J90]  Septic shock (HCC) [A41.9, R65.21]  Pneumonia of right lung due to infectious organism, unspecified part of lung [J18.9]  Sepsis with acute hypoxic respiratory failure, due to unspecified organism, unspecified whether septic shock present (HCC) [A41.9, R65.20, J96.01]  Acute hypoxic respiratory failure (HCC) [J96.01]                   Date / Time: 6/15/2025  1:19 AM    Patient Admission Status: Inpatient   Readmission Risk (Low < 19, Mod (19-27), High > 27): Readmission Risk Score: 15.9    Current PCP: No primary care provider on file.  PCP verified by CM? Yes    Chart Reviewed: Yes      History Provided by: Patient, Child/Family, Significant Other, Medical Record  Patient Orientation: Alert and Oriented    Patient Cognition: Alert    Hospitalization in the last 30 days (Readmission):  Yes    If yes, Readmission Assessment in CM Navigator will be completed.    Advance Directives:      Code Status: Full Code   Patient's Primary Decision Maker is: Legal Next of Kin    Primary Decision Maker: Kathi Helm - Spouse - [phone]    Secondary Decision Maker: Mary Niño - Child - [phone]    Discharge Planning:    Patient lives with: Other (Comment) (nursing home) Type of Home: Skilled Nursing Facility ([facility])  Primary Care Giver: Private caregiver  Patient Support Systems include: Spouse/Significant Other, Children   Current Financial resources:    Current community resources:    Current services prior to admission: Skilled Nursing Facility            Current DME:              Type of Home Care services:  None    ADLS  Prior functional level:

## 2025-06-16 NOTE — ACP (ADVANCE CARE PLANNING)
Advance Care Planning   Healthcare Decision Maker:    Primary Decision Maker: Kathi Helm - Spouse - [phone]    Secondary Decision Maker: RenayMary - Child - [phone]    Click here to complete Healthcare Decision Makers including selection of the Healthcare Decision Maker Relationship (ie \"Primary\").  Today we documented Decision Maker(s) consistent with Legal Next of Kin hierarchy.       If the relationship to the patient does NOT follow our state's Next of Kin hierarchy, the patient MUST complete an ACP Document to allow him/her to act on the patient's behalf. :

## 2025-06-16 NOTE — CARE COORDINATION
Care Coordination: LOS 1 day. Met with wife and daughter at bedside to discuss transition of care needs. Recent hx of [facility], [facility] and then to [facility].  They are not clear on needs yet but do not believe they want to return to [facility]. Pt has trach/capped. She asked for list of facilities if needed, He is medicare and she states he was only there for 5 days.  No hx of hhc. Follows with Dr Powers.  Pt was admitted MICU with acute on chronic resp failure, hx of non small cell lung ca s/p radiation.  Brain mets, RLL pnea, septic shock. Currently on Airvo  Fio2 65 60 ltr 100%. Levophed, IVF, albumin, Solu cortef, zosyn.  Provided with list of facilities that can take trach, will follow up with her in morning to check on dc plan  will follow    Electronically signed by Harika Lindo RN on 6/16/2025 at 3:37 PM     The Plan for Transition of Care is related to the following treatment goals: dc    The Patient and/or patient representative wife, daughter was provided with a choice of provider and agrees   with the discharge plan. [x] Yes [] No    Freedom of choice list was provided with basic dialogue that supports the patient's individualized plan of care/goals, treatment preferences and shares the quality data associated with the providers. [x] Yes [] No

## 2025-06-17 ENCOUNTER — APPOINTMENT (OUTPATIENT)
Dept: GENERAL RADIOLOGY | Age: 70
DRG: 870 | End: 2025-06-17
Payer: MEDICARE

## 2025-06-17 LAB
1,3 BETA GLUCAN SER-MCNC: 44 PG/ML
1,3 BETA-D-GLUCAN INTERP: NEGATIVE
AADO2: 229.3 MMHG
ALBUMIN SERPL-MCNC: 3.6 G/DL (ref 3.5–5.2)
ALP SERPL-CCNC: 51 U/L (ref 40–129)
ALT SERPL-CCNC: 11 U/L (ref 0–50)
ANION GAP SERPL CALCULATED.3IONS-SCNC: 18 MMOL/L (ref 7–16)
AST SERPL-CCNC: 14 U/L (ref 0–50)
B.E.: -7.5 MMOL/L (ref -3–3)
BASOPHILS # BLD: 0 K/UL (ref 0–0.2)
BASOPHILS NFR BLD: 0 % (ref 0–2)
BILIRUB SERPL-MCNC: 0.3 MG/DL (ref 0–1.2)
BUN SERPL-MCNC: 68 MG/DL (ref 8–23)
CALCIUM SERPL-MCNC: 7.7 MG/DL (ref 8.8–10.2)
CHLORIDE SERPL-SCNC: 106 MMOL/L (ref 98–107)
CO2 SERPL-SCNC: 19 MMOL/L (ref 22–29)
COHB: 0.8 % (ref 0–1.5)
CREAT SERPL-MCNC: 3.8 MG/DL (ref 0.7–1.2)
CRITICAL: ABNORMAL
DATE ANALYZED: ABNORMAL
DATE OF COLLECTION: ABNORMAL
EOSINOPHIL # BLD: 0 K/UL (ref 0.05–0.5)
EOSINOPHILS RELATIVE PERCENT: 0 % (ref 0–6)
ERYTHROCYTE [DISTWIDTH] IN BLOOD BY AUTOMATED COUNT: 13.7 % (ref 11.5–15)
FIO2: 55 %
GFR, ESTIMATED: 17 ML/MIN/1.73M2
GLUCOSE SERPL-MCNC: 86 MG/DL (ref 74–99)
HCO3: 20.2 MMOL/L (ref 22–26)
HCT VFR BLD AUTO: 26.8 % (ref 37–54)
HGB BLD-MCNC: 8 G/DL (ref 12.5–16.5)
HHB: 2.5 % (ref 0–5)
INR PPP: 1.1
LAB: ABNORMAL
LYMPHOCYTES NFR BLD: 0.51 K/UL (ref 1.5–4)
LYMPHOCYTES RELATIVE PERCENT: 3 % (ref 20–42)
Lab: 428
MAGNESIUM SERPL-MCNC: 2.1 MG/DL (ref 1.6–2.4)
MCH RBC QN AUTO: 29 PG (ref 26–35)
MCHC RBC AUTO-ENTMCNC: 29.9 G/DL (ref 32–34.5)
MCV RBC AUTO: 97.1 FL (ref 80–99.9)
METHB: 0.6 % (ref 0–1.5)
MICROORGANISM SPEC CULT: NORMAL
MICROORGANISM/AGENT SPEC: NORMAL
MODE: ABNORMAL
MONOCYTES NFR BLD: 1.7 K/UL (ref 0.1–0.95)
MONOCYTES NFR BLD: 9 % (ref 2–12)
MYELOCYTES ABSOLUTE COUNT: 0.51 K/UL
MYELOCYTES: 3 %
NEUTROPHILS NFR BLD: 86 % (ref 43–80)
NEUTS SEG NFR BLD: 16.87 K/UL (ref 1.8–7.3)
O2 SATURATION: 97.5 % (ref 92–98.5)
O2HB: 96.1 % (ref 94–97)
OPERATOR ID: 2593
PARTIAL THROMBOPLASTIN TIME: 33.3 SEC (ref 24.5–35.1)
PATIENT TEMP: 37 C
PCO2: 52.7 MMHG (ref 35–45)
PFO2: 1.89 MMHG/%
PH BLOOD GAS: 7.2 (ref 7.35–7.45)
PHOSPHATE SERPL-MCNC: 7 MG/DL (ref 2.5–4.5)
PLATELET # BLD AUTO: 237 K/UL (ref 130–450)
PMV BLD AUTO: 12.5 FL (ref 7–12)
PO2: 104.2 MMHG (ref 75–100)
POTASSIUM SERPL-SCNC: 4.3 MMOL/L (ref 3.5–5.1)
PROT SERPL-MCNC: 5.1 G/DL (ref 6.4–8.3)
PROTHROMBIN TIME: 11.7 SEC (ref 9.3–12.4)
RBC # BLD AUTO: 2.76 M/UL (ref 3.8–5.8)
RBC # BLD: ABNORMAL 10*6/UL
RI(T): 2.2
SODIUM SERPL-SCNC: 143 MMOL/L (ref 136–145)
SOURCE, BLOOD GAS: ABNORMAL
SPECIMEN DESCRIPTION: NORMAL
THB: 8.9 G/DL (ref 11.5–16.5)
TIME ANALYZED: 438
WBC OTHER # BLD: 19.6 K/UL (ref 4.5–11.5)

## 2025-06-17 PROCEDURE — 83735 ASSAY OF MAGNESIUM: CPT

## 2025-06-17 PROCEDURE — 71045 X-RAY EXAM CHEST 1 VIEW: CPT

## 2025-06-17 PROCEDURE — 87205 SMEAR GRAM STAIN: CPT

## 2025-06-17 PROCEDURE — 31502 CHANGE OF WINDPIPE AIRWAY: CPT | Performed by: SURGERY

## 2025-06-17 PROCEDURE — 87077 CULTURE AEROBIC IDENTIFY: CPT

## 2025-06-17 PROCEDURE — 6370000000 HC RX 637 (ALT 250 FOR IP): Performed by: NURSE PRACTITIONER

## 2025-06-17 PROCEDURE — 0B21XFZ CHANGE TRACHEOSTOMY DEVICE IN TRACHEA, EXTERNAL APPROACH: ICD-10-PCS

## 2025-06-17 PROCEDURE — 2580000003 HC RX 258: Performed by: FAMILY MEDICINE

## 2025-06-17 PROCEDURE — 2700000000 HC OXYGEN THERAPY PER DAY

## 2025-06-17 PROCEDURE — 6360000002 HC RX W HCPCS: Performed by: INTERNAL MEDICINE

## 2025-06-17 PROCEDURE — 36620 INSERTION CATHETER ARTERY: CPT

## 2025-06-17 PROCEDURE — 85025 COMPLETE CBC W/AUTO DIFF WBC: CPT

## 2025-06-17 PROCEDURE — 2709999900 HC NON-CHARGEABLE SUPPLY

## 2025-06-17 PROCEDURE — 31624 DX BRONCHOSCOPE/LAVAGE: CPT

## 2025-06-17 PROCEDURE — 94660 CPAP INITIATION&MGMT: CPT

## 2025-06-17 PROCEDURE — 36592 COLLECT BLOOD FROM PICC: CPT

## 2025-06-17 PROCEDURE — 2500000003 HC RX 250 WO HCPCS: Performed by: INTERNAL MEDICINE

## 2025-06-17 PROCEDURE — 51702 INSERT TEMP BLADDER CATH: CPT

## 2025-06-17 PROCEDURE — 87070 CULTURE OTHR SPECIMN AEROBIC: CPT

## 2025-06-17 PROCEDURE — 80053 COMPREHEN METABOLIC PANEL: CPT

## 2025-06-17 PROCEDURE — 99232 SBSQ HOSP IP/OBS MODERATE 35: CPT | Performed by: STUDENT IN AN ORGANIZED HEALTH CARE EDUCATION/TRAINING PROGRAM

## 2025-06-17 PROCEDURE — 2500000003 HC RX 250 WO HCPCS: Performed by: FAMILY MEDICINE

## 2025-06-17 PROCEDURE — 94002 VENT MGMT INPAT INIT DAY: CPT

## 2025-06-17 PROCEDURE — 94669 MECHANICAL CHEST WALL OSCILL: CPT

## 2025-06-17 PROCEDURE — 0BJ08ZZ INSPECTION OF TRACHEOBRONCHIAL TREE, VIA NATURAL OR ARTIFICIAL OPENING ENDOSCOPIC: ICD-10-PCS | Performed by: INTERNAL MEDICINE

## 2025-06-17 PROCEDURE — 85610 PROTHROMBIN TIME: CPT

## 2025-06-17 PROCEDURE — 2580000003 HC RX 258: Performed by: INTERNAL MEDICINE

## 2025-06-17 PROCEDURE — 94640 AIRWAY INHALATION TREATMENT: CPT

## 2025-06-17 PROCEDURE — 2580000003 HC RX 258

## 2025-06-17 PROCEDURE — 84100 ASSAY OF PHOSPHORUS: CPT

## 2025-06-17 PROCEDURE — 6360000002 HC RX W HCPCS

## 2025-06-17 PROCEDURE — 6360000002 HC RX W HCPCS: Performed by: FAMILY MEDICINE

## 2025-06-17 PROCEDURE — 2000000000 HC ICU R&B

## 2025-06-17 PROCEDURE — P9047 ALBUMIN (HUMAN), 25%, 50ML: HCPCS | Performed by: INTERNAL MEDICINE

## 2025-06-17 PROCEDURE — 0BC58ZZ EXTIRPATION OF MATTER FROM RIGHT MIDDLE LOBE BRONCHUS, VIA NATURAL OR ARTIFICIAL OPENING ENDOSCOPIC: ICD-10-PCS | Performed by: INTERNAL MEDICINE

## 2025-06-17 PROCEDURE — 82805 BLOOD GASES W/O2 SATURATION: CPT

## 2025-06-17 PROCEDURE — 85730 THROMBOPLASTIN TIME PARTIAL: CPT

## 2025-06-17 PROCEDURE — 31502 CHANGE OF WINDPIPE AIRWAY: CPT

## 2025-06-17 PROCEDURE — 5A1955Z RESPIRATORY VENTILATION, GREATER THAN 96 CONSECUTIVE HOURS: ICD-10-PCS

## 2025-06-17 RX ORDER — MIDAZOLAM HYDROCHLORIDE 1 MG/ML
INJECTION, SOLUTION INTRAMUSCULAR; INTRAVENOUS
Status: COMPLETED
Start: 2025-06-17 | End: 2025-06-17

## 2025-06-17 RX ORDER — ACETYLCYSTEINE 200 MG/ML
600 SOLUTION ORAL; RESPIRATORY (INHALATION) EVERY 4 HOURS
Status: DISCONTINUED | OUTPATIENT
Start: 2025-06-17 | End: 2025-06-21

## 2025-06-17 RX ORDER — MIDAZOLAM HYDROCHLORIDE 2 MG/2ML
2 INJECTION, SOLUTION INTRAMUSCULAR; INTRAVENOUS ONCE
Status: COMPLETED | OUTPATIENT
Start: 2025-06-17 | End: 2025-06-17

## 2025-06-17 RX ORDER — MIDAZOLAM HYDROCHLORIDE 2 MG/2ML
5 INJECTION, SOLUTION INTRAMUSCULAR; INTRAVENOUS ONCE
Status: COMPLETED | OUTPATIENT
Start: 2025-06-17 | End: 2025-06-17

## 2025-06-17 RX ORDER — FENTANYL CITRATE 50 UG/ML
INJECTION, SOLUTION INTRAMUSCULAR; INTRAVENOUS
Status: COMPLETED
Start: 2025-06-17 | End: 2025-06-17

## 2025-06-17 RX ORDER — FENTANYL CITRATE-0.9 % NACL/PF 10 MCG/ML
25-200 PLASTIC BAG, INJECTION (ML) INTRAVENOUS CONTINUOUS
Refills: 0 | Status: DISCONTINUED | OUTPATIENT
Start: 2025-06-17 | End: 2025-06-18

## 2025-06-17 RX ORDER — FENTANYL CITRATE 50 UG/ML
50 INJECTION, SOLUTION INTRAMUSCULAR; INTRAVENOUS ONCE
Status: COMPLETED | OUTPATIENT
Start: 2025-06-17 | End: 2025-06-17

## 2025-06-17 RX ORDER — MIDAZOLAM HYDROCHLORIDE 1 MG/ML
1-10 INJECTION, SOLUTION INTRAVENOUS CONTINUOUS
Status: DISCONTINUED | OUTPATIENT
Start: 2025-06-17 | End: 2025-06-18

## 2025-06-17 RX ADMIN — MIDAZOLAM HYDROCHLORIDE 2 MG: 1 INJECTION, SOLUTION INTRAMUSCULAR; INTRAVENOUS at 11:01

## 2025-06-17 RX ADMIN — SODIUM CHLORIDE, PRESERVATIVE FREE 40 MG: 5 INJECTION INTRAVENOUS at 08:47

## 2025-06-17 RX ADMIN — HYDROCORTISONE SODIUM SUCCINATE 100 MG: 100 INJECTION INTRAMUSCULAR; INTRAVENOUS at 11:27

## 2025-06-17 RX ADMIN — FENTANYL CITRATE 50 MCG: 50 INJECTION, SOLUTION INTRAMUSCULAR; INTRAVENOUS at 10:36

## 2025-06-17 RX ADMIN — SODIUM CHLORIDE: 0.9 INJECTION, SOLUTION INTRAVENOUS at 11:21

## 2025-06-17 RX ADMIN — PETROLATUM: 420 OINTMENT TOPICAL at 08:48

## 2025-06-17 RX ADMIN — SODIUM CHLORIDE, PRESERVATIVE FREE 10 ML: 5 INJECTION INTRAVENOUS at 08:48

## 2025-06-17 RX ADMIN — SODIUM CHLORIDE: 0.9 INJECTION, SOLUTION INTRAVENOUS at 20:32

## 2025-06-17 RX ADMIN — BUDESONIDE 250 MCG: 0.25 SUSPENSION RESPIRATORY (INHALATION) at 08:11

## 2025-06-17 RX ADMIN — HYDROCORTISONE SODIUM SUCCINATE 100 MG: 100 INJECTION INTRAMUSCULAR; INTRAVENOUS at 04:22

## 2025-06-17 RX ADMIN — Medication 50 MCG/HR: at 11:14

## 2025-06-17 RX ADMIN — ARFORMOTEROL TARTRATE 15 MCG: 15 SOLUTION RESPIRATORY (INHALATION) at 08:11

## 2025-06-17 RX ADMIN — ALBUMIN (HUMAN) 25 G: 0.25 INJECTION, SOLUTION INTRAVENOUS at 04:20

## 2025-06-17 RX ADMIN — FENTANYL CITRATE 50 MCG: 50 INJECTION INTRAMUSCULAR; INTRAVENOUS at 10:36

## 2025-06-17 RX ADMIN — PIPERACILLIN AND TAZOBACTAM 4500 MG: 4; .5 INJECTION, POWDER, FOR SOLUTION INTRAVENOUS at 01:31

## 2025-06-17 RX ADMIN — PIPERACILLIN AND TAZOBACTAM 4500 MG: 4; .5 INJECTION, POWDER, FOR SOLUTION INTRAVENOUS at 11:27

## 2025-06-17 RX ADMIN — ARFORMOTEROL TARTRATE 15 MCG: 15 SOLUTION RESPIRATORY (INHALATION) at 19:39

## 2025-06-17 RX ADMIN — MIDAZOLAM HYDROCHLORIDE 2 MG: 2 INJECTION, SOLUTION INTRAMUSCULAR; INTRAVENOUS at 10:37

## 2025-06-17 RX ADMIN — HEPARIN SODIUM 5000 UNITS: 5000 INJECTION INTRAVENOUS; SUBCUTANEOUS at 05:55

## 2025-06-17 RX ADMIN — BUDESONIDE 250 MCG: 0.25 SUSPENSION RESPIRATORY (INHALATION) at 19:39

## 2025-06-17 RX ADMIN — ACETYLCYSTEINE 600 MG: 200 INHALANT RESPIRATORY (INHALATION) at 11:59

## 2025-06-17 RX ADMIN — IPRATROPIUM BROMIDE AND ALBUTEROL SULFATE 1 DOSE: 2.5; .5 SOLUTION RESPIRATORY (INHALATION) at 11:59

## 2025-06-17 RX ADMIN — PETROLATUM: 420 OINTMENT TOPICAL at 20:39

## 2025-06-17 RX ADMIN — HEPARIN SODIUM 5000 UNITS: 5000 INJECTION INTRAVENOUS; SUBCUTANEOUS at 14:20

## 2025-06-17 RX ADMIN — SODIUM CHLORIDE: 0.9 INJECTION, SOLUTION INTRAVENOUS at 04:22

## 2025-06-17 RX ADMIN — ALBUMIN (HUMAN) 25 G: 0.25 INJECTION, SOLUTION INTRAVENOUS at 08:52

## 2025-06-17 RX ADMIN — ACETYLCYSTEINE 600 MG: 200 INHALANT RESPIRATORY (INHALATION) at 17:19

## 2025-06-17 RX ADMIN — HEPARIN SODIUM 5000 UNITS: 5000 INJECTION INTRAVENOUS; SUBCUTANEOUS at 20:31

## 2025-06-17 RX ADMIN — MIDAZOLAM HYDROCHLORIDE 2 MG: 1 INJECTION, SOLUTION INTRAMUSCULAR; INTRAVENOUS at 10:37

## 2025-06-17 RX ADMIN — IPRATROPIUM BROMIDE AND ALBUTEROL SULFATE 1 DOSE: 2.5; .5 SOLUTION RESPIRATORY (INHALATION) at 17:19

## 2025-06-17 RX ADMIN — SODIUM CHLORIDE, PRESERVATIVE FREE 10 ML: 5 INJECTION INTRAVENOUS at 20:31

## 2025-06-17 RX ADMIN — MIDAZOLAM HYDROCHLORIDE 5 MG: 1 INJECTION, SOLUTION INTRAMUSCULAR; INTRAVENOUS at 10:54

## 2025-06-17 RX ADMIN — Medication 2 MG/HR: at 11:12

## 2025-06-17 RX ADMIN — IPRATROPIUM BROMIDE AND ALBUTEROL SULFATE 1 DOSE: 2.5; .5 SOLUTION RESPIRATORY (INHALATION) at 19:39

## 2025-06-17 RX ADMIN — IPRATROPIUM BROMIDE AND ALBUTEROL SULFATE 1 DOSE: 2.5; .5 SOLUTION RESPIRATORY (INHALATION) at 08:11

## 2025-06-17 RX ADMIN — ACETYLCYSTEINE 600 MG: 200 INHALANT RESPIRATORY (INHALATION) at 19:39

## 2025-06-17 RX ADMIN — HYDROCORTISONE SODIUM SUCCINATE 100 MG: 100 INJECTION INTRAMUSCULAR; INTRAVENOUS at 17:51

## 2025-06-17 RX ADMIN — MIDAZOLAM HYDROCHLORIDE 5 MG: 2 INJECTION, SOLUTION INTRAMUSCULAR; INTRAVENOUS at 10:54

## 2025-06-17 ASSESSMENT — PULMONARY FUNCTION TESTS
PIF_VALUE: 34
PIF_VALUE: 39
PIF_VALUE: 36
PIF_VALUE: 33
PIF_VALUE: 38
PIF_VALUE: 36
PIF_VALUE: 38
PIF_VALUE: 38
PIF_VALUE: 36
PIF_VALUE: 35
PIF_VALUE: 41
PIF_VALUE: 36
PIF_VALUE: 35
PIF_VALUE: 39
PIF_VALUE: 39

## 2025-06-17 ASSESSMENT — PAIN SCALES - GENERAL
PAINLEVEL_OUTOF10: 0

## 2025-06-17 NOTE — PROCEDURES
Arterial Catheter Insertion Procedure Note    Procedure: Insertion of Arterial Catheter    Indications:      Procedure Details   Informed consent was obtained for the procedure, including sedation.  Risks of  hemorrhage, arrhythmia, infection and adverse drug reaction were discussed.     Under sterile conditions the skin above the  left femoral  was prepped with betadine and covered with a sterile drape. Local anesthesia was applied to the skin and subcutaneous tissues. A 22-gauge needle was inserted into the femoral artery. A guide wire was then passed easily through the catheter which was then advanced with no resistance. Good pulsatile blood returned. The catheter was sutured into place.    Findings:  There were no changes to vital signs. Patient did tolerate procedure well.    Total time of procedure 30 minutes.          
PROCEDURE NOTE  Date: 6/17/2025   Name: Frank Niño  YOB: 1955    Procedures    Patient was placed in supine position. Airvo FiO2 increased to 100% 2mg Versed was administered for sedation. The inner cannula was removed from the tracheostomy. A bougie was the passed through the tracheostomy. The old tracheostomy was then removed and a 6-0 cuffed Shiley XLT was placed over the bougie and inserted into the trachea without difficulty. The cuff was inflated and the patient was ventilating well. The patient tolerated the procedure well.     Dr. Lozano was present for procedure    Tiffani Nguyen MD  Surgery Resident PGY1      
the henrik. After careful inspection of the tracheal, the bronchoscope was sequentially passed into all segments of the left and right endobronchial trees to the second and/or third divisions.    Endobronchial findings    Vocal Cords Normal  Trachea: No tracheal stenosis. Normal mucosa  Henrik  Normal mucosa    Right Main Stem Bronchus  Edematous mucosa  Right Upper Lobe Bronchi Normal mucosa  Right Middle Lobe Bronchi  Edematous mucosa  Right Lower Lobe Bronchi (including the Superior segment)  RLL collapse noted with no endobronchial lesion but with external compression  Mucus Plugging in RML, therapeutic aspiration done.    Left Main Stem Bronchus Normal mucosa  Left Upper Lobe Bronchus, Upper Division Normal mucosa  Left Upper Lobe Bronchus, Lingula  Normal mucosa  Left Lower Lobe Bronchus (including the Superior segment)  Normal mucosa    Specimens Taken    Washings -  Amount - 2 ml

## 2025-06-18 ENCOUNTER — APPOINTMENT (OUTPATIENT)
Dept: GENERAL RADIOLOGY | Age: 70
DRG: 870 | End: 2025-06-18
Payer: MEDICARE

## 2025-06-18 PROBLEM — I48.0 PAROXYSMAL ATRIAL FIBRILLATION (HCC): Status: ACTIVE | Noted: 2025-06-18

## 2025-06-18 PROBLEM — R65.20 SEPSIS WITH ACUTE HYPOXIC RESPIRATORY FAILURE (HCC): Status: ACTIVE | Noted: 2025-06-15

## 2025-06-18 PROBLEM — J96.01 SEPSIS WITH ACUTE HYPOXIC RESPIRATORY FAILURE (HCC): Status: ACTIVE | Noted: 2025-06-15

## 2025-06-18 PROBLEM — J90 PLEURAL EFFUSION ON RIGHT: Status: ACTIVE | Noted: 2025-06-18

## 2025-06-18 LAB
AADO2: 276.4 MMHG
ALBUMIN SERPL-MCNC: 3.6 G/DL (ref 3.5–5.2)
ALBUMIN SERPL-MCNC: 3.6 G/DL (ref 3.5–5.2)
ALP SERPL-CCNC: 117 U/L (ref 40–129)
ALP SERPL-CCNC: 57 U/L (ref 40–129)
ALT SERPL-CCNC: 12 U/L (ref 0–50)
ALT SERPL-CCNC: 25 U/L (ref 0–50)
ANION GAP SERPL CALCULATED.3IONS-SCNC: 15 MMOL/L (ref 7–16)
ANION GAP SERPL CALCULATED.3IONS-SCNC: 16 MMOL/L (ref 7–16)
ANION GAP SERPL CALCULATED.3IONS-SCNC: 17 MMOL/L (ref 7–16)
ANION GAP SERPL CALCULATED.3IONS-SCNC: 19 MMOL/L (ref 7–16)
AST SERPL-CCNC: 14 U/L (ref 0–50)
AST SERPL-CCNC: 17 U/L (ref 0–50)
B-OH-BUTYR SERPL-MCNC: 2.19 MMOL/L (ref 0.02–0.27)
B.E.: -15.5 MMOL/L (ref -3–3)
BASOPHILS # BLD: 0 K/UL (ref 0–0.2)
BASOPHILS NFR BLD: 0 % (ref 0–2)
BILIRUB SERPL-MCNC: 0.3 MG/DL (ref 0–1.2)
BILIRUB SERPL-MCNC: 0.4 MG/DL (ref 0–1.2)
BUN SERPL-MCNC: 58 MG/DL (ref 8–23)
BUN SERPL-MCNC: 64 MG/DL (ref 8–23)
BUN SERPL-MCNC: 64 MG/DL (ref 8–23)
BUN SERPL-MCNC: 65 MG/DL (ref 8–23)
CALCIUM SERPL-MCNC: 6.3 MG/DL (ref 8.8–10.2)
CALCIUM SERPL-MCNC: 7.4 MG/DL (ref 8.8–10.2)
CALCIUM SERPL-MCNC: 7.8 MG/DL (ref 8.8–10.2)
CALCIUM SERPL-MCNC: 8.3 MG/DL (ref 8.8–10.2)
CHLORIDE SERPL-SCNC: 109 MMOL/L (ref 98–107)
CHLORIDE SERPL-SCNC: 110 MMOL/L (ref 98–107)
CO2 SERPL-SCNC: 16 MMOL/L (ref 22–29)
CO2 SERPL-SCNC: 19 MMOL/L (ref 22–29)
CO2 SERPL-SCNC: 20 MMOL/L (ref 22–29)
CO2 SERPL-SCNC: 22 MMOL/L (ref 22–29)
COHB: 0.5 % (ref 0–1.5)
CREAT SERPL-MCNC: 3.3 MG/DL (ref 0.7–1.2)
CREAT SERPL-MCNC: 3.5 MG/DL (ref 0.7–1.2)
CREAT SERPL-MCNC: 3.5 MG/DL (ref 0.7–1.2)
CREAT SERPL-MCNC: 3.7 MG/DL (ref 0.7–1.2)
CRITICAL: ABNORMAL
DATE ANALYZED: ABNORMAL
DATE OF COLLECTION: ABNORMAL
EKG ATRIAL RATE: 208 BPM
EKG ATRIAL RATE: 81 BPM
EKG P AXIS: 77 DEGREES
EKG P-R INTERVAL: 120 MS
EKG Q-T INTERVAL: 268 MS
EKG Q-T INTERVAL: 402 MS
EKG QRS DURATION: 114 MS
EKG QRS DURATION: 120 MS
EKG QTC CALCULATION (BAZETT): 466 MS
EKG QTC CALCULATION (BAZETT): 482 MS
EKG R AXIS: 20 DEGREES
EKG R AXIS: 66 DEGREES
EKG T AXIS: 24 DEGREES
EKG T AXIS: 261 DEGREES
EKG VENTRICULAR RATE: 195 BPM
EKG VENTRICULAR RATE: 81 BPM
EOSINOPHIL # BLD: 0 K/UL (ref 0.05–0.5)
EOSINOPHILS RELATIVE PERCENT: 0 % (ref 0–6)
ERYTHROCYTE [DISTWIDTH] IN BLOOD BY AUTOMATED COUNT: 14.1 % (ref 11.5–15)
FIO2: 55 %
GFR, ESTIMATED: 17 ML/MIN/1.73M2
GFR, ESTIMATED: 18 ML/MIN/1.73M2
GFR, ESTIMATED: 19 ML/MIN/1.73M2
GFR, ESTIMATED: 19 ML/MIN/1.73M2
GLUCOSE SERPL-MCNC: 111 MG/DL (ref 74–99)
GLUCOSE SERPL-MCNC: 122 MG/DL (ref 74–99)
GLUCOSE SERPL-MCNC: 138 MG/DL (ref 74–99)
GLUCOSE SERPL-MCNC: 165 MG/DL (ref 74–99)
HCO3: 11 MMOL/L (ref 22–26)
HCT VFR BLD AUTO: 28.7 % (ref 37–54)
HGB BLD-MCNC: 8.6 G/DL (ref 12.5–16.5)
HHB: 5.5 % (ref 0–5)
INR PPP: 1.1
LAB: ABNORMAL
LACTATE BLDV-SCNC: 1.1 MMOL/L (ref 0.5–2.2)
LYMPHOCYTES NFR BLD: 0.75 K/UL (ref 1.5–4)
LYMPHOCYTES RELATIVE PERCENT: 4 % (ref 20–42)
Lab: 405
MAGNESIUM SERPL-MCNC: 2.2 MG/DL (ref 1.6–2.4)
MCH RBC QN AUTO: 29.4 PG (ref 26–35)
MCHC RBC AUTO-ENTMCNC: 30 G/DL (ref 32–34.5)
MCV RBC AUTO: 98 FL (ref 80–99.9)
METHB: 0.9 % (ref 0–1.5)
MICROORGANISM SPEC CULT: ABNORMAL
MICROORGANISM/AGENT SPEC: ABNORMAL
MODE: AC
MONOCYTES NFR BLD: 0.75 K/UL (ref 0.1–0.95)
MONOCYTES NFR BLD: 4 % (ref 2–12)
MYELOCYTES ABSOLUTE COUNT: 0.19 K/UL
MYELOCYTES: 1 %
NEUTROPHILS NFR BLD: 92 % (ref 43–80)
NEUTS SEG NFR BLD: 20 K/UL (ref 1.8–7.3)
O2 SATURATION: 94.4 % (ref 92–98.5)
O2HB: 93.1 % (ref 94–97)
OPERATOR ID: 1893
PARTIAL THROMBOPLASTIN TIME: 38.2 SEC (ref 24.5–35.1)
PATIENT TEMP: 37 C
PCO2: 28.1 MMHG (ref 35–45)
PEEP/CPAP: 8 CMH2O
PFO2: 1.54 MMHG/%
PH BLOOD GAS: 7.21 (ref 7.35–7.45)
PHOSPHATE SERPL-MCNC: 6.6 MG/DL (ref 2.5–4.5)
PLATELET # BLD AUTO: 229 K/UL (ref 130–450)
PMV BLD AUTO: 12.8 FL (ref 7–12)
PO2: 84.5 MMHG (ref 75–100)
POTASSIUM SERPL-SCNC: 3.2 MMOL/L (ref 3.5–5.1)
POTASSIUM SERPL-SCNC: 3.5 MMOL/L (ref 3.5–5.1)
POTASSIUM SERPL-SCNC: 3.7 MMOL/L (ref 3.5–5.1)
POTASSIUM SERPL-SCNC: 4.6 MMOL/L (ref 3.5–5.1)
PROT SERPL-MCNC: 4.9 G/DL (ref 6.4–8.3)
PROT SERPL-MCNC: 5 G/DL (ref 6.4–8.3)
PROTHROMBIN TIME: 12.5 SEC (ref 9.3–12.4)
RBC # BLD AUTO: 2.93 M/UL (ref 3.8–5.8)
RBC # BLD: ABNORMAL 10*6/UL
RI(T): 3.27
RR MECHANICAL: 16 B/MIN
SERVICE CMNT-IMP: ABNORMAL
SODIUM SERPL-SCNC: 145 MMOL/L (ref 136–145)
SODIUM SERPL-SCNC: 146 MMOL/L (ref 136–145)
SODIUM SERPL-SCNC: 146 MMOL/L (ref 136–145)
SODIUM SERPL-SCNC: 147 MMOL/L (ref 136–145)
SOURCE, BLOOD GAS: ABNORMAL
SPECIMEN DESCRIPTION: ABNORMAL
T4 FREE SERPL-MCNC: 0.3 NG/DL (ref 0.9–1.7)
THB: 6.9 G/DL (ref 11.5–16.5)
TIME ANALYZED: 421
TSH SERPL DL<=0.05 MIU/L-ACNC: 14.7 UIU/ML (ref 0.27–4.2)
VT MECHANICAL: 450 ML
WBC OTHER # BLD: 21.7 K/UL (ref 4.5–11.5)

## 2025-06-18 PROCEDURE — 85025 COMPLETE CBC W/AUTO DIFF WBC: CPT

## 2025-06-18 PROCEDURE — 6370000000 HC RX 637 (ALT 250 FOR IP)

## 2025-06-18 PROCEDURE — 84443 ASSAY THYROID STIM HORMONE: CPT

## 2025-06-18 PROCEDURE — 6360000002 HC RX W HCPCS: Performed by: FAMILY MEDICINE

## 2025-06-18 PROCEDURE — 2580000003 HC RX 258: Performed by: INTERNAL MEDICINE

## 2025-06-18 PROCEDURE — 84439 ASSAY OF FREE THYROXINE: CPT

## 2025-06-18 PROCEDURE — 93005 ELECTROCARDIOGRAM TRACING: CPT

## 2025-06-18 PROCEDURE — 2500000003 HC RX 250 WO HCPCS

## 2025-06-18 PROCEDURE — 2500000003 HC RX 250 WO HCPCS: Performed by: INTERNAL MEDICINE

## 2025-06-18 PROCEDURE — 93010 ELECTROCARDIOGRAM REPORT: CPT | Performed by: INTERNAL MEDICINE

## 2025-06-18 PROCEDURE — 99222 1ST HOSP IP/OBS MODERATE 55: CPT | Performed by: NURSE PRACTITIONER

## 2025-06-18 PROCEDURE — 2580000003 HC RX 258: Performed by: FAMILY MEDICINE

## 2025-06-18 PROCEDURE — 85610 PROTHROMBIN TIME: CPT

## 2025-06-18 PROCEDURE — P9047 ALBUMIN (HUMAN), 25%, 50ML: HCPCS

## 2025-06-18 PROCEDURE — 99497 ADVNCD CARE PLAN 30 MIN: CPT | Performed by: NURSE PRACTITIONER

## 2025-06-18 PROCEDURE — 6360000002 HC RX W HCPCS: Performed by: INTERNAL MEDICINE

## 2025-06-18 PROCEDURE — 80048 BASIC METABOLIC PNL TOTAL CA: CPT

## 2025-06-18 PROCEDURE — 99232 SBSQ HOSP IP/OBS MODERATE 35: CPT | Performed by: STUDENT IN AN ORGANIZED HEALTH CARE EDUCATION/TRAINING PROGRAM

## 2025-06-18 PROCEDURE — 84100 ASSAY OF PHOSPHORUS: CPT

## 2025-06-18 PROCEDURE — 6370000000 HC RX 637 (ALT 250 FOR IP): Performed by: INTERNAL MEDICINE

## 2025-06-18 PROCEDURE — 94669 MECHANICAL CHEST WALL OSCILL: CPT

## 2025-06-18 PROCEDURE — 99291 CRITICAL CARE FIRST HOUR: CPT | Performed by: INTERNAL MEDICINE

## 2025-06-18 PROCEDURE — 71045 X-RAY EXAM CHEST 1 VIEW: CPT

## 2025-06-18 PROCEDURE — 94003 VENT MGMT INPAT SUBQ DAY: CPT

## 2025-06-18 PROCEDURE — 6370000000 HC RX 637 (ALT 250 FOR IP): Performed by: NURSE PRACTITIONER

## 2025-06-18 PROCEDURE — 6360000002 HC RX W HCPCS

## 2025-06-18 PROCEDURE — 2000000000 HC ICU R&B

## 2025-06-18 PROCEDURE — 85730 THROMBOPLASTIN TIME PARTIAL: CPT

## 2025-06-18 PROCEDURE — 36415 COLL VENOUS BLD VENIPUNCTURE: CPT

## 2025-06-18 PROCEDURE — 94640 AIRWAY INHALATION TREATMENT: CPT

## 2025-06-18 PROCEDURE — 93005 ELECTROCARDIOGRAM TRACING: CPT | Performed by: NURSE PRACTITIONER

## 2025-06-18 PROCEDURE — 83735 ASSAY OF MAGNESIUM: CPT

## 2025-06-18 PROCEDURE — 82010 KETONE BODYS QUAN: CPT

## 2025-06-18 PROCEDURE — 2500000003 HC RX 250 WO HCPCS: Performed by: FAMILY MEDICINE

## 2025-06-18 PROCEDURE — 2580000003 HC RX 258

## 2025-06-18 PROCEDURE — 80053 COMPREHEN METABOLIC PANEL: CPT

## 2025-06-18 PROCEDURE — 36592 COLLECT BLOOD FROM PICC: CPT

## 2025-06-18 PROCEDURE — 83605 ASSAY OF LACTIC ACID: CPT

## 2025-06-18 PROCEDURE — 82805 BLOOD GASES W/O2 SATURATION: CPT

## 2025-06-18 RX ORDER — CHLORHEXIDINE GLUCONATE ORAL RINSE 1.2 MG/ML
15 SOLUTION DENTAL 2 TIMES DAILY
Status: DISCONTINUED | OUTPATIENT
Start: 2025-06-18 | End: 2025-06-25 | Stop reason: HOSPADM

## 2025-06-18 RX ORDER — ALBUMIN (HUMAN) 12.5 G/50ML
25 SOLUTION INTRAVENOUS ONCE
Status: COMPLETED | OUTPATIENT
Start: 2025-06-18 | End: 2025-06-18

## 2025-06-18 RX ORDER — IPRATROPIUM BROMIDE AND ALBUTEROL SULFATE 2.5; .5 MG/3ML; MG/3ML
1 SOLUTION RESPIRATORY (INHALATION) EVERY 4 HOURS PRN
Status: DISCONTINUED | OUTPATIENT
Start: 2025-06-18 | End: 2025-06-25 | Stop reason: HOSPADM

## 2025-06-18 RX ORDER — PHENYLEPHRINE HYDROCHLORIDE 10 MG/ML
INJECTION INTRAVENOUS
Status: DISCONTINUED
Start: 2025-06-18 | End: 2025-06-18 | Stop reason: WASHOUT

## 2025-06-18 RX ORDER — FENTANYL CITRATE 50 UG/ML
50 INJECTION, SOLUTION INTRAMUSCULAR; INTRAVENOUS
Status: DISCONTINUED | OUTPATIENT
Start: 2025-06-18 | End: 2025-06-25 | Stop reason: HOSPADM

## 2025-06-18 RX ORDER — SODIUM CHLORIDE, SODIUM LACTATE, POTASSIUM CHLORIDE, CALCIUM CHLORIDE 600; 310; 30; 20 MG/100ML; MG/100ML; MG/100ML; MG/100ML
INJECTION, SOLUTION INTRAVENOUS CONTINUOUS
Status: DISCONTINUED | OUTPATIENT
Start: 2025-06-18 | End: 2025-06-19

## 2025-06-18 RX ORDER — MIDAZOLAM HYDROCHLORIDE 2 MG/2ML
2 INJECTION, SOLUTION INTRAMUSCULAR; INTRAVENOUS EVERY 4 HOURS PRN
Status: DISCONTINUED | OUTPATIENT
Start: 2025-06-18 | End: 2025-06-25 | Stop reason: HOSPADM

## 2025-06-18 RX ORDER — POTASSIUM CHLORIDE 29.8 MG/ML
40 INJECTION INTRAVENOUS ONCE
Status: COMPLETED | OUTPATIENT
Start: 2025-06-18 | End: 2025-06-18

## 2025-06-18 RX ORDER — MAGNESIUM SULFATE 1 G/100ML
1000 INJECTION INTRAVENOUS ONCE
Status: COMPLETED | OUTPATIENT
Start: 2025-06-18 | End: 2025-06-18

## 2025-06-18 RX ADMIN — AMIODARONE HYDROCHLORIDE 150 MG: 50 INJECTION, SOLUTION INTRAVENOUS at 03:26

## 2025-06-18 RX ADMIN — PHENYLEPHRINE HYDROCHLORIDE 30 MCG/MIN: 10 INJECTION INTRAVENOUS at 03:42

## 2025-06-18 RX ADMIN — ACETYLCYSTEINE 600 MG: 200 INHALANT RESPIRATORY (INHALATION) at 01:35

## 2025-06-18 RX ADMIN — PIPERACILLIN AND TAZOBACTAM 4500 MG: 4; .5 INJECTION, POWDER, FOR SOLUTION INTRAVENOUS at 00:21

## 2025-06-18 RX ADMIN — ACETYLCYSTEINE 600 MG: 200 INHALANT RESPIRATORY (INHALATION) at 12:36

## 2025-06-18 RX ADMIN — AMIODARONE HYDROCHLORIDE 0.5 MG/MIN: 1.8 INJECTION, SOLUTION INTRAVENOUS at 09:13

## 2025-06-18 RX ADMIN — PETROLATUM: 420 OINTMENT TOPICAL at 20:58

## 2025-06-18 RX ADMIN — HYDROCORTISONE SODIUM SUCCINATE 100 MG: 100 INJECTION INTRAMUSCULAR; INTRAVENOUS at 18:52

## 2025-06-18 RX ADMIN — SODIUM CHLORIDE, PRESERVATIVE FREE 10 ML: 5 INJECTION INTRAVENOUS at 08:10

## 2025-06-18 RX ADMIN — ACETYLCYSTEINE 600 MG: 200 INHALANT RESPIRATORY (INHALATION) at 16:04

## 2025-06-18 RX ADMIN — Medication 75 MCG/HR: at 02:21

## 2025-06-18 RX ADMIN — IPRATROPIUM BROMIDE AND ALBUTEROL SULFATE 1 DOSE: 2.5; .5 SOLUTION RESPIRATORY (INHALATION) at 08:55

## 2025-06-18 RX ADMIN — CALCIUM GLUCONATE 3000 MG: 98 INJECTION, SOLUTION INTRAVENOUS at 15:45

## 2025-06-18 RX ADMIN — ARFORMOTEROL TARTRATE 15 MCG: 15 SOLUTION RESPIRATORY (INHALATION) at 20:29

## 2025-06-18 RX ADMIN — BUDESONIDE 250 MCG: 0.25 SUSPENSION RESPIRATORY (INHALATION) at 20:29

## 2025-06-18 RX ADMIN — ACETYLCYSTEINE 600 MG: 200 INHALANT RESPIRATORY (INHALATION) at 08:55

## 2025-06-18 RX ADMIN — SODIUM CHLORIDE, PRESERVATIVE FREE 10 ML: 5 INJECTION INTRAVENOUS at 20:58

## 2025-06-18 RX ADMIN — BUDESONIDE 250 MCG: 0.25 SUSPENSION RESPIRATORY (INHALATION) at 08:56

## 2025-06-18 RX ADMIN — MAGNESIUM SULFATE HEPTAHYDRATE 1000 MG: 1 INJECTION, SOLUTION INTRAVENOUS at 04:28

## 2025-06-18 RX ADMIN — AMIODARONE HYDROCHLORIDE 0.5 MG/MIN: 1.8 INJECTION, SOLUTION INTRAVENOUS at 20:55

## 2025-06-18 RX ADMIN — ALBUMIN (HUMAN) 25 G: 0.25 INJECTION, SOLUTION INTRAVENOUS at 03:29

## 2025-06-18 RX ADMIN — Medication 3 MG/HR: at 13:41

## 2025-06-18 RX ADMIN — POTASSIUM CHLORIDE 40 MEQ: 29.8 INJECTION, SOLUTION INTRAVENOUS at 15:07

## 2025-06-18 RX ADMIN — IPRATROPIUM BROMIDE AND ALBUTEROL SULFATE 1 DOSE: 2.5; .5 SOLUTION RESPIRATORY (INHALATION) at 16:04

## 2025-06-18 RX ADMIN — HEPARIN SODIUM 5000 UNITS: 5000 INJECTION INTRAVENOUS; SUBCUTANEOUS at 13:41

## 2025-06-18 RX ADMIN — HYDROCORTISONE SODIUM SUCCINATE 100 MG: 100 INJECTION INTRAMUSCULAR; INTRAVENOUS at 11:51

## 2025-06-18 RX ADMIN — SODIUM BICARBONATE: 84 INJECTION, SOLUTION INTRAVENOUS at 05:11

## 2025-06-18 RX ADMIN — IPRATROPIUM BROMIDE AND ALBUTEROL SULFATE 1 DOSE: 2.5; .5 SOLUTION RESPIRATORY (INHALATION) at 20:15

## 2025-06-18 RX ADMIN — SODIUM CHLORIDE, PRESERVATIVE FREE 40 MG: 5 INJECTION INTRAVENOUS at 08:10

## 2025-06-18 RX ADMIN — ARFORMOTEROL TARTRATE 15 MCG: 15 SOLUTION RESPIRATORY (INHALATION) at 08:55

## 2025-06-18 RX ADMIN — SODIUM CHLORIDE, SODIUM LACTATE, POTASSIUM CHLORIDE, AND CALCIUM CHLORIDE: .6; .31; .03; .02 INJECTION, SOLUTION INTRAVENOUS at 13:08

## 2025-06-18 RX ADMIN — IPRATROPIUM BROMIDE AND ALBUTEROL SULFATE 1 DOSE: .5; 2.5 SOLUTION RESPIRATORY (INHALATION) at 01:39

## 2025-06-18 RX ADMIN — HYDROCORTISONE SODIUM SUCCINATE 100 MG: 100 INJECTION INTRAMUSCULAR; INTRAVENOUS at 02:22

## 2025-06-18 RX ADMIN — SODIUM CHLORIDE, SODIUM LACTATE, POTASSIUM CHLORIDE, AND CALCIUM CHLORIDE: .6; .31; .03; .02 INJECTION, SOLUTION INTRAVENOUS at 22:00

## 2025-06-18 RX ADMIN — CHLORHEXIDINE GLUCONATE, 0.12% ORAL RINSE 15 ML: 1.2 SOLUTION DENTAL at 20:59

## 2025-06-18 RX ADMIN — IPRATROPIUM BROMIDE AND ALBUTEROL SULFATE 1 DOSE: 2.5; .5 SOLUTION RESPIRATORY (INHALATION) at 12:36

## 2025-06-18 RX ADMIN — HEPARIN SODIUM 5000 UNITS: 5000 INJECTION INTRAVENOUS; SUBCUTANEOUS at 22:02

## 2025-06-18 RX ADMIN — PIPERACILLIN AND TAZOBACTAM 4500 MG: 4; .5 INJECTION, POWDER, FOR SOLUTION INTRAVENOUS at 11:55

## 2025-06-18 RX ADMIN — ACETYLCYSTEINE 600 MG: 200 INHALANT RESPIRATORY (INHALATION) at 20:19

## 2025-06-18 RX ADMIN — PETROLATUM: 420 OINTMENT TOPICAL at 08:10

## 2025-06-18 RX ADMIN — AMIODARONE HYDROCHLORIDE 1 MG/MIN: 1.8 INJECTION, SOLUTION INTRAVENOUS at 03:45

## 2025-06-18 RX ADMIN — HEPARIN SODIUM 5000 UNITS: 5000 INJECTION INTRAVENOUS; SUBCUTANEOUS at 04:30

## 2025-06-18 ASSESSMENT — PULMONARY FUNCTION TESTS
PIF_VALUE: 33
PIF_VALUE: 36
PIF_VALUE: 35
PIF_VALUE: 30
PIF_VALUE: 36
PIF_VALUE: 36
PIF_VALUE: 34
PIF_VALUE: 34
PIF_VALUE: 31
PIF_VALUE: 35
PIF_VALUE: 35
PIF_VALUE: 34
PIF_VALUE: 32
PIF_VALUE: 36
PIF_VALUE: 34
PIF_VALUE: 33
PIF_VALUE: 33
PIF_VALUE: 30
PIF_VALUE: 36
PIF_VALUE: 38
PIF_VALUE: 35
PIF_VALUE: 34
PIF_VALUE: 33
PIF_VALUE: 35
PIF_VALUE: 41
PIF_VALUE: 35
PIF_VALUE: 39
PIF_VALUE: 34
PIF_VALUE: 33
PIF_VALUE: 36

## 2025-06-18 ASSESSMENT — PAIN SCALES - GENERAL
PAINLEVEL_OUTOF10: 0

## 2025-06-18 NOTE — CONSULTS
Palliative Care Department  [phone]  Palliative Care Initial Consult  Provider GREGORIA Orta CNP     Frank Niño  [id number]  Hospital Day: 4  Date of Initial Consult: 6/18/2025  Referring Provider: Karen Alanis APRN - CNP  Palliative Medicine was consulted for assistance with: Goals of care    HPI:   Frank Niño is a 69 y.o. with a medical history of COPD,  who was admitted on 6/15/2025 from nursing facility with a CHIEF COMPLAINT of respiratory distress.  Patient was as at [facility] for an extended period of time subsequently [facility] with [facility], at [facility] he was weaned to a capped trach and was utilizing nasal cannula however was transferred to the ICU from [facility] yesterday due to increasing respiratory distress requiring 10 L high flow at the facility.  Upon arrival to the emergency room he was noted to be tachycardic and hypotensive.  Started on vasopressors.  White blood cell count is significantly elevated at 27. He does have a history of right lower lobe non-small cell lung cancer he has undergone radiation both to the mass in the right lower lobe and then subsequently underwent gamma knife stereotactic radiosurgery on April 30, 2025 to a lesion in the right frontal lobe concerning for metastatic disease. The patient was transferred to the ICU for further medical management.     ASSESSMENT/PLAN:     Pertinent Hospital Diagnoses     Septic shock  Acute on chronic hypoxic respiratory failure  Pneumonia  Hyponatremia  Small cell lung cancer  Acute on chronic anemia      Palliative Care Encounter / Counseling Regarding Goals of Care  Please see detailed goals of care discussion as below  At this time, Frank Niño, Does Not have capacity for medical decision-making.  Capacity is time limited and situation/question specific  During encounter Kathi was surrogate medical decision-maker  Outcome of goals of care meeting:   Continue current medical 
[facility]  MD Christian Parrish MD Lisa Kniska, HANK Montero, EMORY Melvin, CNP  Consultation    Patient's Name: Frank Niño  2:52 PM  6/15/2025    Nephrologist: Ruben Art MD    Reason for Consult: DARREN    Requesting Physician:  No primary care provider on file.    Chief Complaint: Respiratory distress    History Obtained From: Patient, chart    History of Present Ilness:    Mr. Brothers is a 69-year-old gentleman admitted to the emergency department from [facility] where he is currently a resident.  Recent past medical history is included severe COPD exacerbation, pneumonia, septic/distributive shock cared for at the [facility].  There he suffered respiratory failure, underwent tracheostomy tube placement as well as prolonged intensive care stay during which she also suffered acute kidney injury, though from which he recovered.  Tracheostomy is capped though he is still dependent on supplemental oxygen through nasal cannula.  He is awake alert and interactive, though unable to speak, though his daughter is at bedside and provides a coherent history.    She tells me that he was noted to be hypoxic on 10 L high flow while at the [facility].  Oxygen was increased as able to little avail.  Also noted to be hypotensive at the [facility].  He was transferred to the ER.  Blood pressure on presentation was 61/38 mmHg he was tachycardic.  He was started on Levophed.  He was given IV fluid bolus.  BUN/creatinine presentation were 98 and 5.6, respectively.  Last known creatinine as of 6/10/2025 was 1.0 mg/dL.  Potassium of 5.8.  Repeat potassium at 3 AM was 5.5.  Repeat studies have been ordered, though not yet collected as of the time of my visit in the ER around 12:30 PM.  Laboratory studies have now been collected upon his arrival to the ICU, though not yet resulted.  Urine output not recorded.        No past medical history on file.    No past surgical history on 
[facility]  Consultation Report  PATIENT: Frank Niño  MEDICAL RECORD NUMBER: [id number]  DATE OF SERVICE:  6/18/2025  ATTENDING ELECTROPHYSIOLOGIST: Kellie Jacobs MD  PRIMARY ELECTROPHYSIOLOGIST: Kellie Jacobs MD  REFERRING PHYSICIAN: No ref. provider found and No primary care provider on file.  CHIEF COMPLAINT: respiratory distress, from nursing home increased SOB 75% on 10L HF at facility ,arrived on CPAP. Has a capped trach    HPI: This is a 69 y.o. male with a history of non-small lung cancer s/p radiation to right lower lobe with brain mets, post stereotactic gamma knife surgery in the right frontal lobe for metastatic disease 0/30/2025, severe COPD ,previously trach dependent but weaned to nasal cannula; now on ventilator due to respiratory distress, DARREN on CKD He presented from [facility] with shortness of breath. He was found to be in septic shock. Right lower lobe pneumonia was noted on CT chest, PleurX catheter in situ. He was started on pressors for hypotension. Respiratory status continued to worsen and general surgery was consulted for a tracheostomy exchange due to need for ventilator management. Patient is now on ventilator and sedated, on pressor therapy. Patient underwent a bronchoscopy yesterday, which revealed RLL collapse with endobronchial lesion, mucus plugging in RML, therapeutic aspiration done. Patient had an EKG done earlier this morning which showed AFIB RVR, was started on amiodarone drip. Upon my evaluation of patient, patient converted to NSR. Amiodarone drip is still infusing at 1mg/min.      Cardiac electrophysiology service is consulted for atrial fibrillation with RVR, now in normal sinus rhythm.    Patient Active Problem List    Diagnosis Date Noted    Septic shock (HCC) 06/15/2025    Pneumonia of right lung due to infectious organism 06/15/2025    Acute hypoxic respiratory failure (HCC) 01/31/2025       No 
6 Cans of beer per week     Comment: only during golf season    Drug use: Not on file    Sexual activity: Not on file   Other Topics Concern    Not on file   Social History Narrative    Not on file     Social Drivers of Health     Financial Resource Strain: Low Risk  (5/5/2025)    Received from [facility]    Overall Financial Resource Strain (CARDIA)     Difficulty of Paying Living Expenses: Not hard at all   Food Insecurity: No Food Insecurity (6/15/2025)    Hunger Vital Sign     Worried About Running Out of Food in the Last Year: Never true     Ran Out of Food in the Last Year: Never true   Transportation Needs: No Transportation Needs (6/15/2025)    PRAPARE - Transportation     Lack of Transportation (Medical): No     Lack of Transportation (Non-Medical): No   Physical Activity: Not on file   Stress: No Stress Concern Present (6/10/2025)    Received from [facility]    [place] of Occupational Health - Occupational Stress Questionnaire     Feeling of Stress : Not at all   Social Connections: Unknown (5/5/2025)    Received from [facility]    Social Connection and Isolation Panel [NHANES]     Frequency of Communication with Friends and Family: More than three times a week     Frequency of Social Gatherings with Friends and Family: More than three times a week     Attends Episcopalian Services: Patient unable to answer     Active Member of Clubs or Organizations: Patient unable to answer     Attends Club or Organization Meetings: Patient unable to answer     Marital Status:    Intimate Partner Violence: Not At Risk (5/2/2025)    Received from [facility]    Domestic Abuse Assessment     Do you feel safe in your relationships at home?: Yes     Physical Abuse: Denies     [facility] Domestic Abuse - Type of Abuse: Not on file     [facility] Domestic Abuse - Time Frame: Not on file     [facility] Domestic Abuse - Signs and Symptoms: Not on file     Verbal Abuse: Denies     [facility] Domestic Abuse - Reported To: Not 
Cigarettes     Quit date: 2012     Years since quittin.1    Smokeless tobacco: Former     Types: Snuff     Quit date: 2020   Vaping Use    Vaping status: Never Used   Substance and Sexual Activity    Alcohol use: Yes     Alcohol/week: 6.0 standard drinks of alcohol     Types: 6 Cans of beer per week     Comment: only during golf season    Drug use: Not on file    Sexual activity: Not on file   Other Topics Concern    Not on file   Social History Narrative    Not on file     Social Drivers of Health     Financial Resource Strain: Low Risk  (2025)    Received from [facility]    Overall Financial Resource Strain (CARDIA)     Difficulty of Paying Living Expenses: Not hard at all   Food Insecurity: No Food Insecurity (2025)    Received from [facility]    Hunger Vital Sign     Worried About Running Out of Food in the Last Year: Never true     Ran Out of Food in the Last Year: Never true   Transportation Needs: No Transportation Needs (6/10/2025)    Received from [facility]     SDOH Transportation Source     Has lack of transportation kept you from medical appointments or from getting medications?: No     Has lack of transportation kept you from meetings, work, or from getting things needed for daily living?: No   Physical Activity: Not on file   Stress: No Stress Concern Present (6/10/2025)    Received from [facility]    Citizen of [place] of Occupational Health - Occupational Stress Questionnaire     Feeling of Stress : Not at all   Social Connections: Unknown (2025)    Received from [facility]    Social Connection and Isolation Panel [NHANES]     Frequency of Communication with Friends and Family: More than three times a week     Frequency of Social Gatherings with Friends and Family: More than three times a week     Attends Zoroastrian Services: Patient unable to answer     Active Member of Clubs or Organizations: Patient unable to answer     Attends Club or Organization 
Intact and Normal ([]RUE  []RLE  []LUE  []LLE)  Skin:    Inspection:   Normal[x] Rash[] Lesion[] Ulcer[]  Palpation:  [x]Warm  []Cool [x]Dry []Clammy  []Nodules []Induration []Skin-tightening     Cap-Refill  [x] <3 sec  [] >3 seconds (delayed)  Neurologic:     4 - Opens spontaneously   6 - Obeys commands for movement   5 - Oriented to time, place, person   Total GCS 15  Sensation grossly intact []    Psych:   Mental Status  Alert:: Yes[x] No[]  Oriented []x0  []x1  []x2  [x]x3   Mood/Affect  [x]Normal  []Flat  []Agitated []Depressed []Anxious  [x]Calm  []Sedated  []NAD    n above exam, underlined portions are required documentation    [facility] Labs within last 24 hours-  BMP:  Recent Labs     06/15/25  0132 06/15/25  0230 06/15/25  0305   * 129*  --    K 6.4* 5.8* 5.48*   CL 87* 88*  --    CO2 25 25  --    BUN 98* 98*  --    CREATININE 5.6* 5.6*  --    CALCIUM 8.2* 7.8*  --    MG 2.8*  --   --      LFTS:  Recent Labs     06/15/25  0132   AST 20   ALT 25   BILITOT 0.2   ALKPHOS 106     Glucose:   Recent Labs     06/15/25  0132 06/15/25  0230 06/15/25  0417 06/15/25  0442 06/15/25  0511 06/15/25  0545 06/15/25  0714 06/15/25  0828   GLUCOSE 158* 154*  --   --   --   --   --   --    POCGLU  --   --  133* 242* 181* 157* 134* 130*     Procal:   Recent Labs     06/15/25  0230   PROCAL 0.47*     CBC:   Recent Labs     06/15/25  0132   WBC 27.6*   HGB 11.5*   HCT 36.0*      MCV 91.4   RDW 13.6     ABGs: No results for input(s): \"PHART\", \"MMR3FSF\", \"PO2ART\", \"LIQ5ROR\", \"N4SRDDAO\", \"FIO2A\" in the last 72 hours.  Lactic Acid: No results for input(s): \"LACTA\" in the last 72 hours.  INR: No results for input(s): \"INR\" in the last 72 hours.    Cardiac Injury Profile: No results for input(s): \"CKTOTAL\", \"CKMB\", \"TROPONINI\" in the last 72 hours.   Labs in Last 3 months:   Lab Results   Component Value Date    VITD25 17.4 (L) 05/15/2025        Microbiology-  Urine Cx: No results found for: \"LABURIN\"  Blood Cx: No results

## 2025-06-18 NOTE — CARE COORDINATION
Care Coordination: LOS  3 day. S/P bronch 6/17-RLL collapse noted with endobronchial lesion Mucus Plugging in RML, therapeutic aspiration done. Currently trach to vent,Fentanyl, Versed,Amio gtt, solu cortef, Zosyn. Palliative following, Full code. EP following. Back door to [facility]. Pt was from [facility], also referral to [facility].  Needs unclear, will follow    Electronically signed by Harika Lindo RN on 6/18/2025 at 12:46 PM

## 2025-06-18 NOTE — ACP (ADVANCE CARE PLANNING)
Advance Care Planning      Palliative Medicine Provider (MD/NP)  Advance Care Planning (ACP) Conversation      Date of Conversation: 06/18/25  The patient and/or authorized decision maker consented to a voluntary Advance Care Planning conversation.   Individuals present for the conversation:   Spouse Kathi    Legal Healthcare Agent(s):    Primary Decision Maker: Kathi Helm - Spouse - [phone]    Secondary Decision Maker: Mary Niño - Child - [phone]    ACP documents available in EMR prior to discussion:  -None    Primary Palliative Diagnosis(es):  Lung cancer  Severe COPD    Conversation Summary:   Patient seen at the bedside, lethargic on sedation.  Patient unable to partake in meaningful conversation and unable to make decisions for himself.  Patient's spouse, Kathi, present at the bedside. Kathi states that the patient has healthcare power of  directives and that she is the patient's POA.  Requested copies for her records.  She is also legal next of kin by hierarchy.     Resuscitation Status:    Code Status: Full Code    Outcomes / Completed Documentation:  An explanation of advance directives and their importance was provided and the following forms completed:    -Patient/surrogate was asked to submit existing ACP documents for our records    If new document completed, original was provided to patient and/or family member.    Copy was placed for scanning into the [facility] EMR.      I spent 30 minutes providing separately identifiable ACP services with the patient and/or surrogate decision maker in a voluntary, in-person conversation discussing the patient's wishes and goals as detailed in the above note.       Tanisha Choi, APRN - CNP

## 2025-06-19 ENCOUNTER — APPOINTMENT (OUTPATIENT)
Dept: GENERAL RADIOLOGY | Age: 70
DRG: 870 | End: 2025-06-19
Payer: MEDICARE

## 2025-06-19 PROBLEM — E43 SEVERE PROTEIN-CALORIE MALNUTRITION: Chronic | Status: ACTIVE | Noted: 2025-06-19

## 2025-06-19 LAB
ALBUMIN SERPL-MCNC: 3.2 G/DL (ref 3.5–5.2)
ALBUMIN SERPL-MCNC: 3.3 G/DL (ref 3.5–5.2)
ALP SERPL-CCNC: 103 U/L (ref 40–129)
ALP SERPL-CCNC: 109 U/L (ref 40–129)
ALT SERPL-CCNC: 25 U/L (ref 0–50)
ALT SERPL-CCNC: 41 U/L (ref 0–50)
ANION GAP SERPL CALCULATED.3IONS-SCNC: 15 MMOL/L (ref 7–16)
ANION GAP SERPL CALCULATED.3IONS-SCNC: 16 MMOL/L (ref 7–16)
AST SERPL-CCNC: 16 U/L (ref 0–50)
AST SERPL-CCNC: 30 U/L (ref 0–50)
BASOPHILS # BLD: 0 K/UL (ref 0–0.2)
BASOPHILS NFR BLD: 0 % (ref 0–2)
BILIRUB SERPL-MCNC: 0.3 MG/DL (ref 0–1.2)
BILIRUB SERPL-MCNC: 0.3 MG/DL (ref 0–1.2)
BUN SERPL-MCNC: 50 MG/DL (ref 8–23)
BUN SERPL-MCNC: 53 MG/DL (ref 8–23)
CALCIUM SERPL-MCNC: 7.9 MG/DL (ref 8.8–10.2)
CALCIUM SERPL-MCNC: 8.5 MG/DL (ref 8.8–10.2)
CHLORIDE SERPL-SCNC: 109 MMOL/L (ref 98–107)
CHLORIDE SERPL-SCNC: 110 MMOL/L (ref 98–107)
CO2 SERPL-SCNC: 19 MMOL/L (ref 22–29)
CO2 SERPL-SCNC: 19 MMOL/L (ref 22–29)
CREAT SERPL-MCNC: 2.9 MG/DL (ref 0.7–1.2)
CREAT SERPL-MCNC: 3 MG/DL (ref 0.7–1.2)
EOSINOPHIL # BLD: 0.21 K/UL (ref 0.05–0.5)
EOSINOPHILS RELATIVE PERCENT: 1 % (ref 0–6)
ERYTHROCYTE [DISTWIDTH] IN BLOOD BY AUTOMATED COUNT: 14.5 % (ref 11.5–15)
GFR, ESTIMATED: 22 ML/MIN/1.73M2
GFR, ESTIMATED: 23 ML/MIN/1.73M2
GLUCOSE SERPL-MCNC: 104 MG/DL (ref 74–99)
GLUCOSE SERPL-MCNC: 91 MG/DL (ref 74–99)
HCT VFR BLD AUTO: 27.6 % (ref 37–54)
HGB BLD-MCNC: 8.4 G/DL (ref 12.5–16.5)
LYMPHOCYTES NFR BLD: 0.41 K/UL (ref 1.5–4)
LYMPHOCYTES RELATIVE PERCENT: 2 % (ref 20–42)
MAGNESIUM SERPL-MCNC: 1.9 MG/DL (ref 1.6–2.4)
MCH RBC QN AUTO: 29.4 PG (ref 26–35)
MCHC RBC AUTO-ENTMCNC: 30.4 G/DL (ref 32–34.5)
MCV RBC AUTO: 96.5 FL (ref 80–99.9)
METAMYELOCYTES ABSOLUTE COUNT: 0.21 K/UL (ref 0–0.12)
METAMYELOCYTES: 1 % (ref 0–1)
MONOCYTES NFR BLD: 1.45 K/UL (ref 0.1–0.95)
MONOCYTES NFR BLD: 7 % (ref 2–12)
MYELOCYTES ABSOLUTE COUNT: 0.62 K/UL
MYELOCYTES: 3 %
NEUTROPHILS NFR BLD: 86 % (ref 43–80)
NEUTS SEG NFR BLD: 17.8 K/UL (ref 1.8–7.3)
PHOSPHATE SERPL-MCNC: 3.3 MG/DL (ref 2.5–4.5)
PLATELET # BLD AUTO: 197 K/UL (ref 130–450)
PMV BLD AUTO: 12.9 FL (ref 7–12)
POTASSIUM SERPL-SCNC: 3.3 MMOL/L (ref 3.5–5.1)
POTASSIUM SERPL-SCNC: 3.5 MMOL/L (ref 3.5–5.1)
PROT SERPL-MCNC: 4.6 G/DL (ref 6.4–8.3)
PROT SERPL-MCNC: 4.7 G/DL (ref 6.4–8.3)
RBC # BLD AUTO: 2.86 M/UL (ref 3.8–5.8)
RBC # BLD: ABNORMAL 10*6/UL
SODIUM SERPL-SCNC: 144 MMOL/L (ref 136–145)
SODIUM SERPL-SCNC: 145 MMOL/L (ref 136–145)
WBC OTHER # BLD: 20.7 K/UL (ref 4.5–11.5)

## 2025-06-19 PROCEDURE — 2500000003 HC RX 250 WO HCPCS: Performed by: INTERNAL MEDICINE

## 2025-06-19 PROCEDURE — 94669 MECHANICAL CHEST WALL OSCILL: CPT

## 2025-06-19 PROCEDURE — 71045 X-RAY EXAM CHEST 1 VIEW: CPT

## 2025-06-19 PROCEDURE — 2580000003 HC RX 258: Performed by: INTERNAL MEDICINE

## 2025-06-19 PROCEDURE — 99291 CRITICAL CARE FIRST HOUR: CPT | Performed by: INTERNAL MEDICINE

## 2025-06-19 PROCEDURE — 51702 INSERT TEMP BLADDER CATH: CPT

## 2025-06-19 PROCEDURE — 6360000002 HC RX W HCPCS: Performed by: INTERNAL MEDICINE

## 2025-06-19 PROCEDURE — 84100 ASSAY OF PHOSPHORUS: CPT

## 2025-06-19 PROCEDURE — 83735 ASSAY OF MAGNESIUM: CPT

## 2025-06-19 PROCEDURE — 2580000003 HC RX 258

## 2025-06-19 PROCEDURE — 85025 COMPLETE CBC W/AUTO DIFF WBC: CPT

## 2025-06-19 PROCEDURE — 2500000003 HC RX 250 WO HCPCS

## 2025-06-19 PROCEDURE — 2580000003 HC RX 258: Performed by: FAMILY MEDICINE

## 2025-06-19 PROCEDURE — 94003 VENT MGMT INPAT SUBQ DAY: CPT

## 2025-06-19 PROCEDURE — 6370000000 HC RX 637 (ALT 250 FOR IP)

## 2025-06-19 PROCEDURE — 6360000002 HC RX W HCPCS: Performed by: FAMILY MEDICINE

## 2025-06-19 PROCEDURE — 2000000000 HC ICU R&B

## 2025-06-19 PROCEDURE — 2500000003 HC RX 250 WO HCPCS: Performed by: FAMILY MEDICINE

## 2025-06-19 PROCEDURE — 99232 SBSQ HOSP IP/OBS MODERATE 35: CPT | Performed by: STUDENT IN AN ORGANIZED HEALTH CARE EDUCATION/TRAINING PROGRAM

## 2025-06-19 PROCEDURE — 80053 COMPREHEN METABOLIC PANEL: CPT

## 2025-06-19 PROCEDURE — 6370000000 HC RX 637 (ALT 250 FOR IP): Performed by: NURSE PRACTITIONER

## 2025-06-19 PROCEDURE — 51798 US URINE CAPACITY MEASURE: CPT

## 2025-06-19 PROCEDURE — 94640 AIRWAY INHALATION TREATMENT: CPT

## 2025-06-19 PROCEDURE — 6370000000 HC RX 637 (ALT 250 FOR IP): Performed by: INTERNAL MEDICINE

## 2025-06-19 RX ORDER — SODIUM CHLORIDE, SODIUM LACTATE, POTASSIUM CHLORIDE, AND CALCIUM CHLORIDE .6; .31; .03; .02 G/100ML; G/100ML; G/100ML; G/100ML
500 INJECTION, SOLUTION INTRAVENOUS ONCE
Status: COMPLETED | OUTPATIENT
Start: 2025-06-19 | End: 2025-06-19

## 2025-06-19 RX ORDER — POTASSIUM CHLORIDE 29.8 MG/ML
40 INJECTION INTRAVENOUS ONCE
Status: COMPLETED | OUTPATIENT
Start: 2025-06-19 | End: 2025-06-19

## 2025-06-19 RX ORDER — CALCIUM GLUCONATE 20 MG/ML
2000 INJECTION, SOLUTION INTRAVENOUS ONCE
Status: COMPLETED | OUTPATIENT
Start: 2025-06-19 | End: 2025-06-19

## 2025-06-19 RX ADMIN — PIPERACILLIN AND TAZOBACTAM 4500 MG: 4; .5 INJECTION, POWDER, FOR SOLUTION INTRAVENOUS at 12:41

## 2025-06-19 RX ADMIN — ACETYLCYSTEINE 600 MG: 200 INHALANT RESPIRATORY (INHALATION) at 12:22

## 2025-06-19 RX ADMIN — HYDROCORTISONE SODIUM SUCCINATE 100 MG: 100 INJECTION INTRAMUSCULAR; INTRAVENOUS at 04:42

## 2025-06-19 RX ADMIN — CALCIUM GLUCONATE 2000 MG: 20 INJECTION, SOLUTION INTRAVENOUS at 10:50

## 2025-06-19 RX ADMIN — ACETYLCYSTEINE 600 MG: 200 INHALANT RESPIRATORY (INHALATION) at 08:42

## 2025-06-19 RX ADMIN — ACETYLCYSTEINE 600 MG: 200 INHALANT RESPIRATORY (INHALATION) at 03:31

## 2025-06-19 RX ADMIN — PIPERACILLIN AND TAZOBACTAM 4500 MG: 4; .5 INJECTION, POWDER, FOR SOLUTION INTRAVENOUS at 00:50

## 2025-06-19 RX ADMIN — IPRATROPIUM BROMIDE AND ALBUTEROL SULFATE 1 DOSE: 2.5; .5 SOLUTION RESPIRATORY (INHALATION) at 16:11

## 2025-06-19 RX ADMIN — IPRATROPIUM BROMIDE AND ALBUTEROL SULFATE 1 DOSE: .5; 2.5 SOLUTION RESPIRATORY (INHALATION) at 01:04

## 2025-06-19 RX ADMIN — HEPARIN SODIUM 5000 UNITS: 5000 INJECTION INTRAVENOUS; SUBCUTANEOUS at 21:58

## 2025-06-19 RX ADMIN — SODIUM CHLORIDE, PRESERVATIVE FREE 40 MG: 5 INJECTION INTRAVENOUS at 08:18

## 2025-06-19 RX ADMIN — IPRATROPIUM BROMIDE AND ALBUTEROL SULFATE 1 DOSE: 2.5; .5 SOLUTION RESPIRATORY (INHALATION) at 12:22

## 2025-06-19 RX ADMIN — BUDESONIDE 250 MCG: 0.25 SUSPENSION RESPIRATORY (INHALATION) at 08:42

## 2025-06-19 RX ADMIN — PETROLATUM: 420 OINTMENT TOPICAL at 08:18

## 2025-06-19 RX ADMIN — POTASSIUM CHLORIDE 40 MEQ: 29.8 INJECTION, SOLUTION INTRAVENOUS at 10:44

## 2025-06-19 RX ADMIN — PIPERACILLIN AND TAZOBACTAM 4500 MG: 4; .5 INJECTION, POWDER, FOR SOLUTION INTRAVENOUS at 23:53

## 2025-06-19 RX ADMIN — SODIUM CHLORIDE, SODIUM LACTATE, POTASSIUM CHLORIDE, AND CALCIUM CHLORIDE: .6; .31; .03; .02 INJECTION, SOLUTION INTRAVENOUS at 04:38

## 2025-06-19 RX ADMIN — FENTANYL CITRATE 50 MCG: 50 INJECTION INTRAMUSCULAR; INTRAVENOUS at 10:40

## 2025-06-19 RX ADMIN — AMIODARONE HYDROCHLORIDE 0.5 MG/MIN: 1.8 INJECTION, SOLUTION INTRAVENOUS at 20:09

## 2025-06-19 RX ADMIN — ACETYLCYSTEINE 600 MG: 200 INHALANT RESPIRATORY (INHALATION) at 20:14

## 2025-06-19 RX ADMIN — PETROLATUM: 420 OINTMENT TOPICAL at 20:22

## 2025-06-19 RX ADMIN — ACETYLCYSTEINE 600 MG: 200 INHALANT RESPIRATORY (INHALATION) at 01:04

## 2025-06-19 RX ADMIN — HEPARIN SODIUM 5000 UNITS: 5000 INJECTION INTRAVENOUS; SUBCUTANEOUS at 05:59

## 2025-06-19 RX ADMIN — IPRATROPIUM BROMIDE AND ALBUTEROL SULFATE 1 DOSE: 2.5; .5 SOLUTION RESPIRATORY (INHALATION) at 20:14

## 2025-06-19 RX ADMIN — ARFORMOTEROL TARTRATE 15 MCG: 15 SOLUTION RESPIRATORY (INHALATION) at 08:42

## 2025-06-19 RX ADMIN — MIDAZOLAM HYDROCHLORIDE 2 MG: 1 INJECTION, SOLUTION INTRAMUSCULAR; INTRAVENOUS at 09:40

## 2025-06-19 RX ADMIN — SODIUM CHLORIDE, SODIUM LACTATE, POTASSIUM CHLORIDE, AND CALCIUM CHLORIDE 500 ML: .6; .31; .03; .02 INJECTION, SOLUTION INTRAVENOUS at 02:37

## 2025-06-19 RX ADMIN — IPRATROPIUM BROMIDE AND ALBUTEROL SULFATE 1 DOSE: 2.5; .5 SOLUTION RESPIRATORY (INHALATION) at 08:42

## 2025-06-19 RX ADMIN — AMIODARONE HYDROCHLORIDE 0.5 MG/MIN: 1.8 INJECTION, SOLUTION INTRAVENOUS at 08:00

## 2025-06-19 RX ADMIN — IPRATROPIUM BROMIDE AND ALBUTEROL SULFATE 1 DOSE: .5; 2.5 SOLUTION RESPIRATORY (INHALATION) at 03:32

## 2025-06-19 RX ADMIN — SODIUM CHLORIDE, PRESERVATIVE FREE 10 ML: 5 INJECTION INTRAVENOUS at 20:21

## 2025-06-19 RX ADMIN — ACETYLCYSTEINE 600 MG: 200 INHALANT RESPIRATORY (INHALATION) at 16:11

## 2025-06-19 RX ADMIN — BUDESONIDE 250 MCG: 0.25 SUSPENSION RESPIRATORY (INHALATION) at 20:14

## 2025-06-19 RX ADMIN — CHLORHEXIDINE GLUCONATE, 0.12% ORAL RINSE 15 ML: 1.2 SOLUTION DENTAL at 20:21

## 2025-06-19 RX ADMIN — CHLORHEXIDINE GLUCONATE, 0.12% ORAL RINSE 15 ML: 1.2 SOLUTION DENTAL at 08:18

## 2025-06-19 RX ADMIN — ARFORMOTEROL TARTRATE 15 MCG: 15 SOLUTION RESPIRATORY (INHALATION) at 20:14

## 2025-06-19 RX ADMIN — AMIODARONE HYDROCHLORIDE 150 MG: 50 INJECTION, SOLUTION INTRAVENOUS at 10:41

## 2025-06-19 ASSESSMENT — PULMONARY FUNCTION TESTS
PIF_VALUE: 29
PIF_VALUE: 35
PIF_VALUE: 31
PIF_VALUE: 34
PIF_VALUE: 32
PIF_VALUE: 36
PIF_VALUE: 31
PIF_VALUE: 33
PIF_VALUE: 32
PIF_VALUE: 27
PIF_VALUE: 25
PIF_VALUE: 29
PIF_VALUE: 30
PIF_VALUE: 28
PIF_VALUE: 38
PIF_VALUE: 35
PIF_VALUE: 28
PIF_VALUE: 30
PIF_VALUE: 27
PIF_VALUE: 29
PIF_VALUE: 35
PIF_VALUE: 30
PIF_VALUE: 25
PIF_VALUE: 34
PIF_VALUE: 34
PIF_VALUE: 35
PIF_VALUE: 38
PIF_VALUE: 32
PIF_VALUE: 33
PIF_VALUE: 29

## 2025-06-19 ASSESSMENT — PAIN SCALES - GENERAL
PAINLEVEL_OUTOF10: 6
PAINLEVEL_OUTOF10: 0
PAINLEVEL_OUTOF10: 2
PAINLEVEL_OUTOF10: 0
PAINLEVEL_OUTOF10: 0

## 2025-06-19 ASSESSMENT — PAIN - FUNCTIONAL ASSESSMENT: PAIN_FUNCTIONAL_ASSESSMENT: ACTIVITIES ARE NOT PREVENTED

## 2025-06-19 ASSESSMENT — PAIN DESCRIPTION - DESCRIPTORS: DESCRIPTORS: ACHING;DISCOMFORT

## 2025-06-19 ASSESSMENT — PAIN DESCRIPTION - ORIENTATION: ORIENTATION: MID

## 2025-06-19 ASSESSMENT — PAIN DESCRIPTION - LOCATION: LOCATION: GENERALIZED

## 2025-06-19 NOTE — CARE COORDINATION
Care Coordination: LOS 4 day. Met with wife, she would like referral to [facility]- as they were there in past. She is concerned about running out of medicare days as she is at day 89.  She is not sure if she can go to [facility] and use life time reserve days.  She also wants a referral to [facility] as well. Referrals sent. I asked Esperanza from [facility] if they can look into medicare days as well    Electronically signed by Harika Lindo RN on 6/19/2025 at 2:38 PM

## 2025-06-20 ENCOUNTER — APPOINTMENT (OUTPATIENT)
Dept: GENERAL RADIOLOGY | Age: 70
DRG: 870 | End: 2025-06-20
Payer: MEDICARE

## 2025-06-20 PROBLEM — Z51.5 PALLIATIVE CARE ENCOUNTER: Status: ACTIVE | Noted: 2025-06-20

## 2025-06-20 LAB
AADO2: 167 MMHG
ALBUMIN SERPL-MCNC: 3 G/DL (ref 3.5–5.2)
ALBUMIN SERPL-MCNC: 3.1 G/DL (ref 3.5–5.2)
ALBUMIN SERPL-MCNC: 3.1 G/DL (ref 3.5–5.2)
ALP SERPL-CCNC: 100 U/L (ref 40–129)
ALP SERPL-CCNC: 103 U/L (ref 40–129)
ALP SERPL-CCNC: 103 U/L (ref 40–129)
ALT SERPL-CCNC: 29 U/L (ref 0–50)
ALT SERPL-CCNC: 35 U/L (ref 0–50)
ALT SERPL-CCNC: 37 U/L (ref 0–50)
ANION GAP SERPL CALCULATED.3IONS-SCNC: 14 MMOL/L (ref 7–16)
ANION GAP SERPL CALCULATED.3IONS-SCNC: 14 MMOL/L (ref 7–16)
ANION GAP SERPL CALCULATED.3IONS-SCNC: 15 MMOL/L (ref 7–16)
ANION GAP SERPL CALCULATED.3IONS-SCNC: 18 MMOL/L (ref 7–16)
AST SERPL-CCNC: 16 U/L (ref 0–50)
AST SERPL-CCNC: 24 U/L (ref 0–50)
AST SERPL-CCNC: 25 U/L (ref 0–50)
B.E.: -4.4 MMOL/L (ref -3–3)
BILIRUB SERPL-MCNC: 0.2 MG/DL (ref 0–1.2)
BILIRUB SERPL-MCNC: 0.3 MG/DL (ref 0–1.2)
BILIRUB SERPL-MCNC: 0.3 MG/DL (ref 0–1.2)
BUN SERPL-MCNC: 45 MG/DL (ref 8–23)
BUN SERPL-MCNC: 45 MG/DL (ref 8–23)
BUN SERPL-MCNC: 47 MG/DL (ref 8–23)
BUN SERPL-MCNC: 49 MG/DL (ref 8–23)
CALCIUM SERPL-MCNC: 8 MG/DL (ref 8.8–10.2)
CALCIUM SERPL-MCNC: 8 MG/DL (ref 8.8–10.2)
CALCIUM SERPL-MCNC: 8.3 MG/DL (ref 8.8–10.2)
CALCIUM SERPL-MCNC: 8.3 MG/DL (ref 8.8–10.2)
CHLORIDE SERPL-SCNC: 107 MMOL/L (ref 98–107)
CHLORIDE SERPL-SCNC: 107 MMOL/L (ref 98–107)
CHLORIDE SERPL-SCNC: 111 MMOL/L (ref 98–107)
CHLORIDE SERPL-SCNC: 111 MMOL/L (ref 98–107)
CO2 SERPL-SCNC: 20 MMOL/L (ref 22–29)
CO2 SERPL-SCNC: 22 MMOL/L (ref 22–29)
COHB: 0.3 % (ref 0–1.5)
CREAT SERPL-MCNC: 2.7 MG/DL (ref 0.7–1.2)
CREAT SERPL-MCNC: 2.7 MG/DL (ref 0.7–1.2)
CREAT SERPL-MCNC: 2.8 MG/DL (ref 0.7–1.2)
CREAT SERPL-MCNC: 2.8 MG/DL (ref 0.7–1.2)
CRITICAL: ABNORMAL
DATE ANALYZED: ABNORMAL
DATE OF COLLECTION: ABNORMAL
ERYTHROCYTE [DISTWIDTH] IN BLOOD BY AUTOMATED COUNT: 14.6 % (ref 11.5–15)
FIO2: 40 %
GFR, ESTIMATED: 23 ML/MIN/1.73M2
GFR, ESTIMATED: 24 ML/MIN/1.73M2
GFR, ESTIMATED: 24 ML/MIN/1.73M2
GFR, ESTIMATED: 25 ML/MIN/1.73M2
GLUCOSE SERPL-MCNC: 121 MG/DL (ref 74–99)
GLUCOSE SERPL-MCNC: 125 MG/DL (ref 74–99)
GLUCOSE SERPL-MCNC: 86 MG/DL (ref 74–99)
GLUCOSE SERPL-MCNC: 99 MG/DL (ref 74–99)
HCO3: 20.1 MMOL/L (ref 22–26)
HCT VFR BLD AUTO: 27.7 % (ref 37–54)
HGB BLD-MCNC: 8.5 G/DL (ref 12.5–16.5)
HHB: 4.4 % (ref 0–5)
LAB: ABNORMAL
Lab: 421
MAGNESIUM SERPL-MCNC: 1.8 MG/DL (ref 1.6–2.4)
MCH RBC QN AUTO: 29.2 PG (ref 26–35)
MCHC RBC AUTO-ENTMCNC: 30.7 G/DL (ref 32–34.5)
MCV RBC AUTO: 95.2 FL (ref 80–99.9)
METHB: 0.5 % (ref 0–1.5)
MICROORGANISM SPEC CULT: NORMAL
MICROORGANISM/AGENT SPEC: NORMAL
MODE: AC
O2 SATURATION: 95.6 % (ref 92–98.5)
O2HB: 94.8 % (ref 94–97)
OPERATOR ID: 421
PATIENT TEMP: 37 C
PCO2: 34.4 MMHG (ref 35–45)
PEEP/CPAP: 8 CMH2O
PFO2: 1.96 MMHG/%
PH BLOOD GAS: 7.38 (ref 7.35–7.45)
PHOSPHATE SERPL-MCNC: 2.8 MG/DL (ref 2.5–4.5)
PLATELET # BLD AUTO: 182 K/UL (ref 130–450)
PMV BLD AUTO: 12.5 FL (ref 7–12)
PO2: 78.6 MMHG (ref 75–100)
POTASSIUM SERPL-SCNC: 3.4 MMOL/L (ref 3.5–5.1)
POTASSIUM SERPL-SCNC: 3.6 MMOL/L (ref 3.5–5.1)
POTASSIUM SERPL-SCNC: 3.8 MMOL/L (ref 3.5–5.1)
POTASSIUM SERPL-SCNC: 4 MMOL/L (ref 3.5–5.1)
PROT SERPL-MCNC: 4.4 G/DL (ref 6.4–8.3)
PROT SERPL-MCNC: 4.6 G/DL (ref 6.4–8.3)
PROT SERPL-MCNC: 4.8 G/DL (ref 6.4–8.3)
RBC # BLD AUTO: 2.91 M/UL (ref 3.8–5.8)
RI(T): 2.13
RR MECHANICAL: 16 B/MIN
SERVICE CMNT-IMP: NORMAL
SERVICE CMNT-IMP: NORMAL
SODIUM SERPL-SCNC: 143 MMOL/L (ref 136–145)
SODIUM SERPL-SCNC: 144 MMOL/L (ref 136–145)
SODIUM SERPL-SCNC: 145 MMOL/L (ref 136–145)
SODIUM SERPL-SCNC: 146 MMOL/L (ref 136–145)
SOURCE, BLOOD GAS: ABNORMAL
SPECIMEN DESCRIPTION: NORMAL
THB: 9.2 G/DL (ref 11.5–16.5)
TIME ANALYZED: 424
VT MECHANICAL: 450 ML
WBC OTHER # BLD: 19.2 K/UL (ref 4.5–11.5)

## 2025-06-20 PROCEDURE — 2000000000 HC ICU R&B

## 2025-06-20 PROCEDURE — 83735 ASSAY OF MAGNESIUM: CPT

## 2025-06-20 PROCEDURE — 2580000003 HC RX 258: Performed by: INTERNAL MEDICINE

## 2025-06-20 PROCEDURE — 94640 AIRWAY INHALATION TREATMENT: CPT

## 2025-06-20 PROCEDURE — 94003 VENT MGMT INPAT SUBQ DAY: CPT

## 2025-06-20 PROCEDURE — 80053 COMPREHEN METABOLIC PANEL: CPT

## 2025-06-20 PROCEDURE — 2500000003 HC RX 250 WO HCPCS: Performed by: INTERNAL MEDICINE

## 2025-06-20 PROCEDURE — 97530 THERAPEUTIC ACTIVITIES: CPT

## 2025-06-20 PROCEDURE — 6370000000 HC RX 637 (ALT 250 FOR IP): Performed by: INTERNAL MEDICINE

## 2025-06-20 PROCEDURE — 99231 SBSQ HOSP IP/OBS SF/LOW 25: CPT | Performed by: NURSE PRACTITIONER

## 2025-06-20 PROCEDURE — 74018 RADEX ABDOMEN 1 VIEW: CPT

## 2025-06-20 PROCEDURE — 6370000000 HC RX 637 (ALT 250 FOR IP): Performed by: NURSE PRACTITIONER

## 2025-06-20 PROCEDURE — 80048 BASIC METABOLIC PNL TOTAL CA: CPT

## 2025-06-20 PROCEDURE — 97166 OT EVAL MOD COMPLEX 45 MIN: CPT

## 2025-06-20 PROCEDURE — 82805 BLOOD GASES W/O2 SATURATION: CPT

## 2025-06-20 PROCEDURE — 6360000002 HC RX W HCPCS: Performed by: INTERNAL MEDICINE

## 2025-06-20 PROCEDURE — 6370000000 HC RX 637 (ALT 250 FOR IP)

## 2025-06-20 PROCEDURE — 84100 ASSAY OF PHOSPHORUS: CPT

## 2025-06-20 PROCEDURE — 85027 COMPLETE CBC AUTOMATED: CPT

## 2025-06-20 PROCEDURE — 2500000003 HC RX 250 WO HCPCS

## 2025-06-20 PROCEDURE — 6360000002 HC RX W HCPCS: Performed by: FAMILY MEDICINE

## 2025-06-20 PROCEDURE — 2500000003 HC RX 250 WO HCPCS: Performed by: FAMILY MEDICINE

## 2025-06-20 PROCEDURE — 99291 CRITICAL CARE FIRST HOUR: CPT | Performed by: INTERNAL MEDICINE

## 2025-06-20 PROCEDURE — 2709999900 HC NON-CHARGEABLE SUPPLY

## 2025-06-20 PROCEDURE — 97162 PT EVAL MOD COMPLEX 30 MIN: CPT

## 2025-06-20 PROCEDURE — 6360000002 HC RX W HCPCS

## 2025-06-20 PROCEDURE — 99232 SBSQ HOSP IP/OBS MODERATE 35: CPT | Performed by: STUDENT IN AN ORGANIZED HEALTH CARE EDUCATION/TRAINING PROGRAM

## 2025-06-20 RX ORDER — POTASSIUM CHLORIDE 29.8 MG/ML
20 INJECTION INTRAVENOUS ONCE
Status: COMPLETED | OUTPATIENT
Start: 2025-06-20 | End: 2025-06-20

## 2025-06-20 RX ORDER — POTASSIUM CHLORIDE 7.45 MG/ML
10 INJECTION INTRAVENOUS
Status: COMPLETED | OUTPATIENT
Start: 2025-06-20 | End: 2025-06-20

## 2025-06-20 RX ORDER — FUROSEMIDE 10 MG/ML
40 INJECTION INTRAMUSCULAR; INTRAVENOUS ONCE
Status: COMPLETED | OUTPATIENT
Start: 2025-06-20 | End: 2025-06-20

## 2025-06-20 RX ORDER — LEVOTHYROXINE SODIUM 25 UG/1
25 TABLET ORAL DAILY
Status: DISCONTINUED | OUTPATIENT
Start: 2025-06-20 | End: 2025-06-25 | Stop reason: HOSPADM

## 2025-06-20 RX ORDER — MAGNESIUM SULFATE 1 G/100ML
1000 INJECTION INTRAVENOUS ONCE
Status: COMPLETED | OUTPATIENT
Start: 2025-06-20 | End: 2025-06-20

## 2025-06-20 RX ADMIN — BUDESONIDE 250 MCG: 0.25 SUSPENSION RESPIRATORY (INHALATION) at 08:36

## 2025-06-20 RX ADMIN — IPRATROPIUM BROMIDE AND ALBUTEROL SULFATE 1 DOSE: 2.5; .5 SOLUTION RESPIRATORY (INHALATION) at 08:36

## 2025-06-20 RX ADMIN — AMIODARONE HYDROCHLORIDE 0.5 MG/MIN: 1.8 INJECTION, SOLUTION INTRAVENOUS at 06:42

## 2025-06-20 RX ADMIN — HEPARIN SODIUM 5000 UNITS: 5000 INJECTION INTRAVENOUS; SUBCUTANEOUS at 22:03

## 2025-06-20 RX ADMIN — IPRATROPIUM BROMIDE AND ALBUTEROL SULFATE 1 DOSE: .5; 2.5 SOLUTION RESPIRATORY (INHALATION) at 00:17

## 2025-06-20 RX ADMIN — ARFORMOTEROL TARTRATE 15 MCG: 15 SOLUTION RESPIRATORY (INHALATION) at 19:40

## 2025-06-20 RX ADMIN — BUDESONIDE 250 MCG: 0.25 SUSPENSION RESPIRATORY (INHALATION) at 19:40

## 2025-06-20 RX ADMIN — POTASSIUM CHLORIDE 20 MEQ: 29.8 INJECTION, SOLUTION INTRAVENOUS at 06:34

## 2025-06-20 RX ADMIN — SODIUM CHLORIDE, PRESERVATIVE FREE 40 MG: 5 INJECTION INTRAVENOUS at 08:26

## 2025-06-20 RX ADMIN — IPRATROPIUM BROMIDE AND ALBUTEROL SULFATE 1 DOSE: 2.5; .5 SOLUTION RESPIRATORY (INHALATION) at 16:13

## 2025-06-20 RX ADMIN — ARFORMOTEROL TARTRATE 15 MCG: 15 SOLUTION RESPIRATORY (INHALATION) at 08:36

## 2025-06-20 RX ADMIN — HEPARIN SODIUM 5000 UNITS: 5000 INJECTION INTRAVENOUS; SUBCUTANEOUS at 15:03

## 2025-06-20 RX ADMIN — ACETYLCYSTEINE 600 MG: 200 INHALANT RESPIRATORY (INHALATION) at 19:40

## 2025-06-20 RX ADMIN — ACETYLCYSTEINE 600 MG: 200 INHALANT RESPIRATORY (INHALATION) at 00:17

## 2025-06-20 RX ADMIN — POTASSIUM CHLORIDE 10 MEQ: 7.46 INJECTION, SOLUTION INTRAVENOUS at 00:58

## 2025-06-20 RX ADMIN — PETROLATUM: 420 OINTMENT TOPICAL at 20:09

## 2025-06-20 RX ADMIN — SODIUM CHLORIDE, PRESERVATIVE FREE 10 ML: 5 INJECTION INTRAVENOUS at 08:27

## 2025-06-20 RX ADMIN — IPRATROPIUM BROMIDE AND ALBUTEROL SULFATE 1 DOSE: .5; 2.5 SOLUTION RESPIRATORY (INHALATION) at 03:44

## 2025-06-20 RX ADMIN — HEPARIN SODIUM 5000 UNITS: 5000 INJECTION INTRAVENOUS; SUBCUTANEOUS at 05:32

## 2025-06-20 RX ADMIN — ACETYLCYSTEINE 600 MG: 200 INHALANT RESPIRATORY (INHALATION) at 08:36

## 2025-06-20 RX ADMIN — ACETYLCYSTEINE 600 MG: 200 INHALANT RESPIRATORY (INHALATION) at 16:12

## 2025-06-20 RX ADMIN — IPRATROPIUM BROMIDE AND ALBUTEROL SULFATE 1 DOSE: 2.5; .5 SOLUTION RESPIRATORY (INHALATION) at 19:40

## 2025-06-20 RX ADMIN — AMIODARONE HYDROCHLORIDE 0.5 MG/MIN: 1.8 INJECTION, SOLUTION INTRAVENOUS at 17:33

## 2025-06-20 RX ADMIN — ACETYLCYSTEINE 600 MG: 200 INHALANT RESPIRATORY (INHALATION) at 12:08

## 2025-06-20 RX ADMIN — PIPERACILLIN AND TAZOBACTAM 4500 MG: 4; .5 INJECTION, POWDER, FOR SOLUTION INTRAVENOUS at 10:18

## 2025-06-20 RX ADMIN — FUROSEMIDE 40 MG: 10 INJECTION, SOLUTION INTRAMUSCULAR; INTRAVENOUS at 10:14

## 2025-06-20 RX ADMIN — ACETYLCYSTEINE 600 MG: 200 INHALANT RESPIRATORY (INHALATION) at 03:44

## 2025-06-20 RX ADMIN — POTASSIUM CHLORIDE 10 MEQ: 7.46 INJECTION, SOLUTION INTRAVENOUS at 01:54

## 2025-06-20 RX ADMIN — SULFAMETHOXAZOLE AND TRIMETHOPRIM 379.2 MG: 80; 16 INJECTION, SOLUTION, CONCENTRATE INTRAVENOUS at 13:31

## 2025-06-20 RX ADMIN — POTASSIUM BICARBONATE 40 MEQ: 782 TABLET, EFFERVESCENT ORAL at 20:10

## 2025-06-20 RX ADMIN — CHLORHEXIDINE GLUCONATE, 0.12% ORAL RINSE 15 ML: 1.2 SOLUTION DENTAL at 20:09

## 2025-06-20 RX ADMIN — CHLORHEXIDINE GLUCONATE, 0.12% ORAL RINSE 15 ML: 1.2 SOLUTION DENTAL at 08:26

## 2025-06-20 RX ADMIN — IPRATROPIUM BROMIDE AND ALBUTEROL SULFATE 1 DOSE: 2.5; .5 SOLUTION RESPIRATORY (INHALATION) at 12:08

## 2025-06-20 RX ADMIN — PETROLATUM: 420 OINTMENT TOPICAL at 08:20

## 2025-06-20 RX ADMIN — SODIUM CHLORIDE, PRESERVATIVE FREE 10 ML: 5 INJECTION INTRAVENOUS at 20:10

## 2025-06-20 RX ADMIN — MAGNESIUM SULFATE HEPTAHYDRATE 1000 MG: 1 INJECTION, SOLUTION INTRAVENOUS at 06:38

## 2025-06-20 ASSESSMENT — PULMONARY FUNCTION TESTS
PIF_VALUE: 29
PIF_VALUE: 31
PIF_VALUE: 17
PIF_VALUE: 29
PIF_VALUE: 34
PIF_VALUE: 30
PIF_VALUE: 30
PIF_VALUE: 21
PIF_VALUE: 17
PIF_VALUE: 38
PIF_VALUE: 31
PIF_VALUE: 32
PIF_VALUE: 21
PIF_VALUE: 29
PIF_VALUE: 30
PIF_VALUE: 24
PIF_VALUE: 30
PIF_VALUE: 28
PIF_VALUE: 30
PIF_VALUE: 28
PIF_VALUE: 32
PIF_VALUE: 32
PIF_VALUE: 23
PIF_VALUE: 31
PIF_VALUE: 35
PIF_VALUE: 30
PIF_VALUE: 17
PIF_VALUE: 38
PIF_VALUE: 27
PIF_VALUE: 31
PIF_VALUE: 40

## 2025-06-20 NOTE — CARE COORDINATION
Care Coordination: LOS 5 day. Met with pt, wife, [facility] liaison and myself. Pt has been accepted to [facility]. Can transfer today if medically stable and cleared by ICU/Attending. Can also call liaison tomorrow and set up as well. Ambulance transport on chart. Call physicians for transport [phone].  Call charlene from [facility] for room number . I have messaged the attending as well to update her    Electronically signed by Harika Lindo RN on 6/20/2025 at 2:57 PM     ADDENDUM: I have PS'd ICU attending to check if pt is clear for [facility] as well.  ICU attending feels pt can transfer tomorrow if medically stable    Electronically signed by Harika Lindo RN on 6/20/2025 at 3:10 PM     ADDENDUM: I have updated the patient as well  Electronically signed by Harika Lindo RN on 6/20/2025 at 3:13 PM

## 2025-06-21 ENCOUNTER — APPOINTMENT (OUTPATIENT)
Dept: GENERAL RADIOLOGY | Age: 70
DRG: 870 | End: 2025-06-21
Payer: MEDICARE

## 2025-06-21 LAB
AADO2: 194.3 MMHG
ALBUMIN SERPL-MCNC: 3.1 G/DL (ref 3.5–5.2)
ALP SERPL-CCNC: 102 U/L (ref 40–129)
ALT SERPL-CCNC: 32 U/L (ref 0–50)
ANION GAP SERPL CALCULATED.3IONS-SCNC: 15 MMOL/L (ref 7–16)
AST SERPL-CCNC: 17 U/L (ref 0–50)
B.E.: -2.4 MMOL/L (ref -3–3)
BASOPHILS # BLD: 0 K/UL (ref 0–0.2)
BASOPHILS # BLD: 0 K/UL (ref 0–0.2)
BASOPHILS NFR BLD: 0 % (ref 0–2)
BASOPHILS NFR BLD: 0 % (ref 0–2)
BILIRUB SERPL-MCNC: <0.2 MG/DL (ref 0–1.2)
BUN SERPL-MCNC: 43 MG/DL (ref 8–23)
CALCIUM SERPL-MCNC: 8 MG/DL (ref 8.8–10.2)
CHLORIDE SERPL-SCNC: 106 MMOL/L (ref 98–107)
CO2 SERPL-SCNC: 21 MMOL/L (ref 22–29)
COHB: 0.3 % (ref 0–1.5)
COMMENT: ABNORMAL
CREAT SERPL-MCNC: 2.6 MG/DL (ref 0.7–1.2)
CRITICAL: ABNORMAL
DATE ANALYZED: ABNORMAL
DATE OF COLLECTION: ABNORMAL
EOSINOPHIL # BLD: 0 K/UL (ref 0.05–0.5)
EOSINOPHIL # BLD: 0.42 K/UL (ref 0.05–0.5)
EOSINOPHILS RELATIVE PERCENT: 0 % (ref 0–6)
EOSINOPHILS RELATIVE PERCENT: 2 % (ref 0–6)
ERYTHROCYTE [DISTWIDTH] IN BLOOD BY AUTOMATED COUNT: 14.7 % (ref 11.5–15)
ERYTHROCYTE [DISTWIDTH] IN BLOOD BY AUTOMATED COUNT: 14.7 % (ref 11.5–15)
FIO2: 40 %
GFR, ESTIMATED: 26 ML/MIN/1.73M2
GLUCOSE SERPL-MCNC: 177 MG/DL (ref 74–99)
HCO3: 23.3 MMOL/L (ref 22–26)
HCT VFR BLD AUTO: 28.4 % (ref 37–54)
HCT VFR BLD AUTO: 29.8 % (ref 37–54)
HGB BLD-MCNC: 8.6 G/DL (ref 12.5–16.5)
HGB BLD-MCNC: 8.9 G/DL (ref 12.5–16.5)
HHB: 26.2 % (ref 0–5)
LAB: ABNORMAL
LYMPHOCYTES NFR BLD: 0 K/UL (ref 1.5–4)
LYMPHOCYTES NFR BLD: 0.24 K/UL (ref 1.5–4)
LYMPHOCYTES RELATIVE PERCENT: 0 % (ref 20–42)
LYMPHOCYTES RELATIVE PERCENT: 1 % (ref 20–42)
Lab: 456
MAGNESIUM SERPL-MCNC: 1.7 MG/DL (ref 1.6–2.4)
MCH RBC QN AUTO: 28.7 PG (ref 26–35)
MCH RBC QN AUTO: 28.9 PG (ref 26–35)
MCHC RBC AUTO-ENTMCNC: 29.9 G/DL (ref 32–34.5)
MCHC RBC AUTO-ENTMCNC: 30.3 G/DL (ref 32–34.5)
MCV RBC AUTO: 95.3 FL (ref 80–99.9)
MCV RBC AUTO: 96.1 FL (ref 80–99.9)
METAMYELOCYTES ABSOLUTE COUNT: 0.21 K/UL (ref 0–0.12)
METAMYELOCYTES: 1 % (ref 0–1)
METHB: 0.3 % (ref 0–1.5)
MICROORGANISM SPEC CULT: ABNORMAL
MICROORGANISM SPEC CULT: ABNORMAL
MICROORGANISM/AGENT SPEC: ABNORMAL
MODE: AC
MONOCYTES NFR BLD: 0.98 K/UL (ref 0.1–0.95)
MONOCYTES NFR BLD: 1.05 K/UL (ref 0.1–0.95)
MONOCYTES NFR BLD: 4 % (ref 2–12)
MONOCYTES NFR BLD: 4 % (ref 2–12)
MYELOCYTES ABSOLUTE COUNT: 0.84 K/UL
MYELOCYTES ABSOLUTE COUNT: 0.98 K/UL
MYELOCYTES: 4 %
MYELOCYTES: 4 %
NEUTROPHILS NFR BLD: 90 % (ref 43–80)
NEUTROPHILS NFR BLD: 92 % (ref 43–80)
NEUTS SEG NFR BLD: 21.59 K/UL (ref 1.8–7.3)
NEUTS SEG NFR BLD: 25.9 K/UL (ref 1.8–7.3)
O2 SATURATION: 73.6 % (ref 92–98.5)
O2HB: 73.2 % (ref 94–97)
OPERATOR ID: 7291
PATIENT TEMP: 37 C
PCO2: 43.7 MMHG (ref 35–45)
PEEP/CPAP: 8 CMH2O
PFO2: 1.01 MMHG/%
PH BLOOD GAS: 7.34 (ref 7.35–7.45)
PHOSPHATE SERPL-MCNC: 2.4 MG/DL (ref 2.5–4.5)
PLATELET, FLUORESCENCE: 191 K/UL (ref 130–450)
PLATELET, FLUORESCENCE: 214 K/UL (ref 130–450)
PMV BLD AUTO: 13.4 FL (ref 7–12)
PMV BLD AUTO: 13.7 FL (ref 7–12)
PO2: 40.6 MMHG (ref 75–100)
POTASSIUM SERPL-SCNC: 3.7 MMOL/L (ref 3.5–5.1)
PROT SERPL-MCNC: 4.8 G/DL (ref 6.4–8.3)
RBC # BLD AUTO: 2.98 M/UL (ref 3.8–5.8)
RBC # BLD AUTO: 3.1 M/UL (ref 3.8–5.8)
RBC # BLD: ABNORMAL 10*6/UL
RI(T): 4.79
RR MECHANICAL: 16 B/MIN
SODIUM SERPL-SCNC: 142 MMOL/L (ref 136–145)
SOURCE, BLOOD GAS: ABNORMAL
SPECIMEN DESCRIPTION: ABNORMAL
THB: 10.4 G/DL (ref 11.5–16.5)
TIME ANALYZED: 513
VT MECHANICAL: 450 ML
WBC OTHER # BLD: 24.1 K/UL (ref 4.5–11.5)
WBC OTHER # BLD: 28.1 K/UL (ref 4.5–11.5)

## 2025-06-21 PROCEDURE — 2000000000 HC ICU R&B

## 2025-06-21 PROCEDURE — 85025 COMPLETE CBC W/AUTO DIFF WBC: CPT

## 2025-06-21 PROCEDURE — 83735 ASSAY OF MAGNESIUM: CPT

## 2025-06-21 PROCEDURE — 94640 AIRWAY INHALATION TREATMENT: CPT

## 2025-06-21 PROCEDURE — 82805 BLOOD GASES W/O2 SATURATION: CPT

## 2025-06-21 PROCEDURE — 99232 SBSQ HOSP IP/OBS MODERATE 35: CPT | Performed by: STUDENT IN AN ORGANIZED HEALTH CARE EDUCATION/TRAINING PROGRAM

## 2025-06-21 PROCEDURE — 2500000003 HC RX 250 WO HCPCS

## 2025-06-21 PROCEDURE — 71045 X-RAY EXAM CHEST 1 VIEW: CPT

## 2025-06-21 PROCEDURE — 84100 ASSAY OF PHOSPHORUS: CPT

## 2025-06-21 PROCEDURE — 80053 COMPREHEN METABOLIC PANEL: CPT

## 2025-06-21 PROCEDURE — 6360000002 HC RX W HCPCS: Performed by: FAMILY MEDICINE

## 2025-06-21 PROCEDURE — 99291 CRITICAL CARE FIRST HOUR: CPT | Performed by: INTERNAL MEDICINE

## 2025-06-21 PROCEDURE — 2500000003 HC RX 250 WO HCPCS: Performed by: FAMILY MEDICINE

## 2025-06-21 PROCEDURE — 6360000002 HC RX W HCPCS: Performed by: INTERNAL MEDICINE

## 2025-06-21 PROCEDURE — 99232 SBSQ HOSP IP/OBS MODERATE 35: CPT | Performed by: INTERNAL MEDICINE

## 2025-06-21 PROCEDURE — 6370000000 HC RX 637 (ALT 250 FOR IP): Performed by: NURSE PRACTITIONER

## 2025-06-21 PROCEDURE — 94003 VENT MGMT INPAT SUBQ DAY: CPT

## 2025-06-21 PROCEDURE — 6370000000 HC RX 637 (ALT 250 FOR IP)

## 2025-06-21 PROCEDURE — 2580000003 HC RX 258: Performed by: INTERNAL MEDICINE

## 2025-06-21 PROCEDURE — 6370000000 HC RX 637 (ALT 250 FOR IP): Performed by: INTERNAL MEDICINE

## 2025-06-21 PROCEDURE — 2500000003 HC RX 250 WO HCPCS: Performed by: INTERNAL MEDICINE

## 2025-06-21 RX ORDER — ACETYLCYSTEINE 200 MG/ML
600 SOLUTION ORAL; RESPIRATORY (INHALATION)
Status: DISCONTINUED | OUTPATIENT
Start: 2025-06-21 | End: 2025-06-23

## 2025-06-21 RX ORDER — FUROSEMIDE 10 MG/ML
40 INJECTION INTRAMUSCULAR; INTRAVENOUS EVERY 8 HOURS
Status: COMPLETED | OUTPATIENT
Start: 2025-06-21 | End: 2025-06-21

## 2025-06-21 RX ORDER — ALBUTEROL SULFATE 0.83 MG/ML
2.5 SOLUTION RESPIRATORY (INHALATION) EVERY 6 HOURS
Status: DISCONTINUED | OUTPATIENT
Start: 2025-06-21 | End: 2025-06-25 | Stop reason: HOSPADM

## 2025-06-21 RX ORDER — AMIODARONE HYDROCHLORIDE 200 MG/1
200 TABLET ORAL 2 TIMES DAILY
Status: DISCONTINUED | OUTPATIENT
Start: 2025-06-21 | End: 2025-06-25 | Stop reason: HOSPADM

## 2025-06-21 RX ADMIN — ARFORMOTEROL TARTRATE 15 MCG: 15 SOLUTION RESPIRATORY (INHALATION) at 20:01

## 2025-06-21 RX ADMIN — BUDESONIDE 250 MCG: 0.25 SUSPENSION RESPIRATORY (INHALATION) at 08:19

## 2025-06-21 RX ADMIN — PETROLATUM: 420 OINTMENT TOPICAL at 09:56

## 2025-06-21 RX ADMIN — PETROLATUM: 420 OINTMENT TOPICAL at 21:37

## 2025-06-21 RX ADMIN — SODIUM CHLORIDE, PRESERVATIVE FREE 10 ML: 5 INJECTION INTRAVENOUS at 21:37

## 2025-06-21 RX ADMIN — FUROSEMIDE 40 MG: 10 INJECTION, SOLUTION INTRAMUSCULAR; INTRAVENOUS at 13:55

## 2025-06-21 RX ADMIN — AMIODARONE HYDROCHLORIDE 200 MG: 200 TABLET ORAL at 12:25

## 2025-06-21 RX ADMIN — ARFORMOTEROL TARTRATE 15 MCG: 15 SOLUTION RESPIRATORY (INHALATION) at 08:19

## 2025-06-21 RX ADMIN — CHLORHEXIDINE GLUCONATE, 0.12% ORAL RINSE 15 ML: 1.2 SOLUTION DENTAL at 21:37

## 2025-06-21 RX ADMIN — IPRATROPIUM BROMIDE AND ALBUTEROL SULFATE 1 DOSE: .5; 2.5 SOLUTION RESPIRATORY (INHALATION) at 03:27

## 2025-06-21 RX ADMIN — HEPARIN SODIUM 5000 UNITS: 5000 INJECTION INTRAVENOUS; SUBCUTANEOUS at 21:39

## 2025-06-21 RX ADMIN — ALBUTEROL SULFATE 2.5 MG: 2.5 SOLUTION RESPIRATORY (INHALATION) at 12:02

## 2025-06-21 RX ADMIN — AMIODARONE HYDROCHLORIDE 0.5 MG/MIN: 1.8 INJECTION, SOLUTION INTRAVENOUS at 05:21

## 2025-06-21 RX ADMIN — MIDAZOLAM HYDROCHLORIDE 2 MG: 1 INJECTION, SOLUTION INTRAMUSCULAR; INTRAVENOUS at 16:35

## 2025-06-21 RX ADMIN — AMIODARONE HYDROCHLORIDE 200 MG: 200 TABLET ORAL at 21:43

## 2025-06-21 RX ADMIN — BUDESONIDE 250 MCG: 0.25 SUSPENSION RESPIRATORY (INHALATION) at 20:01

## 2025-06-21 RX ADMIN — ACETYLCYSTEINE 600 MG: 200 INHALANT RESPIRATORY (INHALATION) at 03:27

## 2025-06-21 RX ADMIN — ONDANSETRON 4 MG: 2 INJECTION, SOLUTION INTRAMUSCULAR; INTRAVENOUS at 22:25

## 2025-06-21 RX ADMIN — MIDAZOLAM HYDROCHLORIDE 2 MG: 1 INJECTION, SOLUTION INTRAMUSCULAR; INTRAVENOUS at 10:15

## 2025-06-21 RX ADMIN — IPRATROPIUM BROMIDE AND ALBUTEROL SULFATE 1 DOSE: .5; 2.5 SOLUTION RESPIRATORY (INHALATION) at 00:33

## 2025-06-21 RX ADMIN — SULFAMETHOXAZOLE AND TRIMETHOPRIM 379.2 MG: 80; 16 INJECTION, SOLUTION, CONCENTRATE INTRAVENOUS at 13:58

## 2025-06-21 RX ADMIN — ACETYLCYSTEINE 600 MG: 200 INHALANT RESPIRATORY (INHALATION) at 20:01

## 2025-06-21 RX ADMIN — SODIUM CHLORIDE, PRESERVATIVE FREE 10 ML: 5 INJECTION INTRAVENOUS at 09:56

## 2025-06-21 RX ADMIN — LEVOTHYROXINE SODIUM 25 MCG: 0.03 TABLET ORAL at 05:51

## 2025-06-21 RX ADMIN — HEPARIN SODIUM 5000 UNITS: 5000 INJECTION INTRAVENOUS; SUBCUTANEOUS at 05:32

## 2025-06-21 RX ADMIN — IPRATROPIUM BROMIDE AND ALBUTEROL SULFATE 1 DOSE: 2.5; .5 SOLUTION RESPIRATORY (INHALATION) at 08:19

## 2025-06-21 RX ADMIN — SODIUM CHLORIDE, PRESERVATIVE FREE 40 MG: 5 INJECTION INTRAVENOUS at 09:56

## 2025-06-21 RX ADMIN — ALBUTEROL SULFATE 2.5 MG: 2.5 SOLUTION RESPIRATORY (INHALATION) at 20:01

## 2025-06-21 RX ADMIN — ACETYLCYSTEINE 600 MG: 200 INHALANT RESPIRATORY (INHALATION) at 00:33

## 2025-06-21 RX ADMIN — FUROSEMIDE 40 MG: 10 INJECTION, SOLUTION INTRAMUSCULAR; INTRAVENOUS at 21:41

## 2025-06-21 RX ADMIN — CHLORHEXIDINE GLUCONATE, 0.12% ORAL RINSE 15 ML: 1.2 SOLUTION DENTAL at 09:56

## 2025-06-21 RX ADMIN — ACETYLCYSTEINE 600 MG: 200 INHALANT RESPIRATORY (INHALATION) at 12:02

## 2025-06-21 RX ADMIN — HEPARIN SODIUM 5000 UNITS: 5000 INJECTION INTRAVENOUS; SUBCUTANEOUS at 13:55

## 2025-06-21 RX ADMIN — ACETYLCYSTEINE 600 MG: 200 INHALANT RESPIRATORY (INHALATION) at 08:20

## 2025-06-21 RX ADMIN — SULFAMETHOXAZOLE AND TRIMETHOPRIM 379.2 MG: 80; 16 INJECTION, SOLUTION, CONCENTRATE INTRAVENOUS at 01:42

## 2025-06-21 ASSESSMENT — PULMONARY FUNCTION TESTS
PIF_VALUE: 40
PIF_VALUE: 20
PIF_VALUE: 32
PIF_VALUE: 34
PIF_VALUE: 45
PIF_VALUE: 30
PIF_VALUE: 25
PIF_VALUE: 30
PIF_VALUE: 34
PIF_VALUE: 17
PIF_VALUE: 39
PIF_VALUE: 32
PIF_VALUE: 49
PIF_VALUE: 17
PIF_VALUE: 43
PIF_VALUE: 30
PIF_VALUE: 34
PIF_VALUE: 32
PIF_VALUE: 30
PIF_VALUE: 33
PIF_VALUE: 17
PIF_VALUE: 32
PIF_VALUE: 32
PIF_VALUE: 31
PIF_VALUE: 46
PIF_VALUE: 30
PIF_VALUE: 31
PIF_VALUE: 24
PIF_VALUE: 30
PIF_VALUE: 31
PIF_VALUE: 30

## 2025-06-21 ASSESSMENT — PAIN SCALES - GENERAL
PAINLEVEL_OUTOF10: 0

## 2025-06-22 ENCOUNTER — APPOINTMENT (OUTPATIENT)
Dept: GENERAL RADIOLOGY | Age: 70
DRG: 870 | End: 2025-06-22
Payer: MEDICARE

## 2025-06-22 LAB
AADO2: 257.3 MMHG
ALBUMIN SERPL-MCNC: 2.9 G/DL (ref 3.5–5.2)
ALP SERPL-CCNC: 93 U/L (ref 40–129)
ALT SERPL-CCNC: 25 U/L (ref 0–50)
ANION GAP SERPL CALCULATED.3IONS-SCNC: 13 MMOL/L (ref 7–16)
AST SERPL-CCNC: 15 U/L (ref 0–50)
B.E.: -5.9 MMOL/L (ref -3–3)
BILIRUB SERPL-MCNC: <0.2 MG/DL (ref 0–1.2)
BUN SERPL-MCNC: 43 MG/DL (ref 8–23)
CALCIUM SERPL-MCNC: 7.6 MG/DL (ref 8.8–10.2)
CHLORIDE SERPL-SCNC: 103 MMOL/L (ref 98–107)
CO2 SERPL-SCNC: 24 MMOL/L (ref 22–29)
COHB: 0.3 % (ref 0–1.5)
CREAT SERPL-MCNC: 2.5 MG/DL (ref 0.7–1.2)
CRITICAL: ABNORMAL
DATE ANALYZED: ABNORMAL
DATE OF COLLECTION: ABNORMAL
ERYTHROCYTE [DISTWIDTH] IN BLOOD BY AUTOMATED COUNT: 14.9 % (ref 11.5–15)
FIO2: 50 %
GFR, ESTIMATED: 28 ML/MIN/1.73M2
GLUCOSE SERPL-MCNC: 122 MG/DL (ref 74–99)
HCO3: 18.8 MMOL/L (ref 22–26)
HCT VFR BLD AUTO: 28.5 % (ref 37–54)
HGB BLD-MCNC: 8.6 G/DL (ref 12.5–16.5)
HHB: 9.4 % (ref 0–5)
LAB: ABNORMAL
Lab: 518
MAGNESIUM SERPL-MCNC: 1.6 MG/DL (ref 1.6–2.4)
MCH RBC QN AUTO: 28.9 PG (ref 26–35)
MCHC RBC AUTO-ENTMCNC: 30.2 G/DL (ref 32–34.5)
MCV RBC AUTO: 95.6 FL (ref 80–99.9)
METHB: 0.9 % (ref 0–1.5)
MODE: AC
O2 SATURATION: 90.5 % (ref 92–98.5)
O2HB: 89.4 % (ref 94–97)
OPERATOR ID: 7291
PATIENT TEMP: 37 C
PCO2: 33.8 MMHG (ref 35–45)
PEEP/CPAP: 8 CMH2O
PFO2: 1.22 MMHG/%
PH BLOOD GAS: 7.36 (ref 7.35–7.45)
PHOSPHATE SERPL-MCNC: 2.6 MG/DL (ref 2.5–4.5)
PLATELET, FLUORESCENCE: 195 K/UL (ref 130–450)
PMV BLD AUTO: 13.6 FL (ref 7–12)
PO2: 61.2 MMHG (ref 75–100)
POTASSIUM SERPL-SCNC: 3.4 MMOL/L (ref 3.5–5.1)
PROT SERPL-MCNC: 4.7 G/DL (ref 6.4–8.3)
RBC # BLD AUTO: 2.98 M/UL (ref 3.8–5.8)
RI(T): 4.2
RR MECHANICAL: 16 B/MIN
SODIUM SERPL-SCNC: 140 MMOL/L (ref 136–145)
SOURCE, BLOOD GAS: ABNORMAL
THB: 9.9 G/DL (ref 11.5–16.5)
TIME ANALYZED: 524
VT MECHANICAL: 450 ML
WBC OTHER # BLD: 28.5 K/UL (ref 4.5–11.5)

## 2025-06-22 PROCEDURE — 6360000002 HC RX W HCPCS: Performed by: INTERNAL MEDICINE

## 2025-06-22 PROCEDURE — 85027 COMPLETE CBC AUTOMATED: CPT

## 2025-06-22 PROCEDURE — 71045 X-RAY EXAM CHEST 1 VIEW: CPT

## 2025-06-22 PROCEDURE — 94640 AIRWAY INHALATION TREATMENT: CPT

## 2025-06-22 PROCEDURE — 84100 ASSAY OF PHOSPHORUS: CPT

## 2025-06-22 PROCEDURE — 76937 US GUIDE VASCULAR ACCESS: CPT

## 2025-06-22 PROCEDURE — 2500000003 HC RX 250 WO HCPCS: Performed by: INTERNAL MEDICINE

## 2025-06-22 PROCEDURE — 94003 VENT MGMT INPAT SUBQ DAY: CPT

## 2025-06-22 PROCEDURE — 2000000000 HC ICU R&B

## 2025-06-22 PROCEDURE — 80048 BASIC METABOLIC PNL TOTAL CA: CPT

## 2025-06-22 PROCEDURE — 82805 BLOOD GASES W/O2 SATURATION: CPT

## 2025-06-22 PROCEDURE — 6360000002 HC RX W HCPCS: Performed by: FAMILY MEDICINE

## 2025-06-22 PROCEDURE — 6370000000 HC RX 637 (ALT 250 FOR IP): Performed by: INTERNAL MEDICINE

## 2025-06-22 PROCEDURE — 93005 ELECTROCARDIOGRAM TRACING: CPT

## 2025-06-22 PROCEDURE — 99291 CRITICAL CARE FIRST HOUR: CPT | Performed by: INTERNAL MEDICINE

## 2025-06-22 PROCEDURE — 2580000003 HC RX 258: Performed by: INTERNAL MEDICINE

## 2025-06-22 PROCEDURE — 99232 SBSQ HOSP IP/OBS MODERATE 35: CPT | Performed by: STUDENT IN AN ORGANIZED HEALTH CARE EDUCATION/TRAINING PROGRAM

## 2025-06-22 PROCEDURE — 02HV33Z INSERTION OF INFUSION DEVICE INTO SUPERIOR VENA CAVA, PERCUTANEOUS APPROACH: ICD-10-PCS | Performed by: STUDENT IN AN ORGANIZED HEALTH CARE EDUCATION/TRAINING PROGRAM

## 2025-06-22 PROCEDURE — 94669 MECHANICAL CHEST WALL OSCILL: CPT

## 2025-06-22 PROCEDURE — 80053 COMPREHEN METABOLIC PANEL: CPT

## 2025-06-22 PROCEDURE — 83735 ASSAY OF MAGNESIUM: CPT

## 2025-06-22 PROCEDURE — C1751 CATH, INF, PER/CENT/MIDLINE: HCPCS

## 2025-06-22 PROCEDURE — 6370000000 HC RX 637 (ALT 250 FOR IP)

## 2025-06-22 PROCEDURE — 2500000003 HC RX 250 WO HCPCS: Performed by: FAMILY MEDICINE

## 2025-06-22 PROCEDURE — 36569 INSJ PICC 5 YR+ W/O IMAGING: CPT

## 2025-06-22 PROCEDURE — 6360000002 HC RX W HCPCS

## 2025-06-22 RX ORDER — LIDOCAINE HYDROCHLORIDE 10 MG/ML
50 INJECTION, SOLUTION EPIDURAL; INFILTRATION; INTRACAUDAL; PERINEURAL ONCE
Status: DISCONTINUED | OUTPATIENT
Start: 2025-06-22 | End: 2025-06-25 | Stop reason: HOSPADM

## 2025-06-22 RX ORDER — SODIUM CHLORIDE 0.9 % (FLUSH) 0.9 %
5-40 SYRINGE (ML) INJECTION PRN
Status: DISCONTINUED | OUTPATIENT
Start: 2025-06-22 | End: 2025-06-25 | Stop reason: HOSPADM

## 2025-06-22 RX ORDER — SODIUM CHLORIDE 9 MG/ML
INJECTION, SOLUTION INTRAVENOUS PRN
Status: DISCONTINUED | OUTPATIENT
Start: 2025-06-22 | End: 2025-06-25 | Stop reason: HOSPADM

## 2025-06-22 RX ORDER — FUROSEMIDE 10 MG/ML
40 INJECTION INTRAMUSCULAR; INTRAVENOUS EVERY 8 HOURS
Status: COMPLETED | OUTPATIENT
Start: 2025-06-22 | End: 2025-06-22

## 2025-06-22 RX ORDER — SODIUM CHLORIDE 0.9 % (FLUSH) 0.9 %
5-40 SYRINGE (ML) INJECTION EVERY 12 HOURS SCHEDULED
Status: DISCONTINUED | OUTPATIENT
Start: 2025-06-22 | End: 2025-06-25 | Stop reason: HOSPADM

## 2025-06-22 RX ORDER — MAGNESIUM SULFATE IN WATER 40 MG/ML
2000 INJECTION, SOLUTION INTRAVENOUS ONCE
Status: COMPLETED | OUTPATIENT
Start: 2025-06-22 | End: 2025-06-22

## 2025-06-22 RX ADMIN — BUDESONIDE 250 MCG: 0.25 SUSPENSION RESPIRATORY (INHALATION) at 08:13

## 2025-06-22 RX ADMIN — ONDANSETRON 4 MG: 2 INJECTION, SOLUTION INTRAMUSCULAR; INTRAVENOUS at 16:20

## 2025-06-22 RX ADMIN — POTASSIUM BICARBONATE 40 MEQ: 782 TABLET, EFFERVESCENT ORAL at 06:36

## 2025-06-22 RX ADMIN — ALBUTEROL SULFATE 2.5 MG: 2.5 SOLUTION RESPIRATORY (INHALATION) at 00:18

## 2025-06-22 RX ADMIN — ACETYLCYSTEINE 600 MG: 200 INHALANT RESPIRATORY (INHALATION) at 20:36

## 2025-06-22 RX ADMIN — ALBUTEROL SULFATE 2.5 MG: 2.5 SOLUTION RESPIRATORY (INHALATION) at 20:36

## 2025-06-22 RX ADMIN — BUDESONIDE 250 MCG: 0.25 SUSPENSION RESPIRATORY (INHALATION) at 20:36

## 2025-06-22 RX ADMIN — MAGNESIUM SULFATE HEPTAHYDRATE 2000 MG: 40 INJECTION, SOLUTION INTRAVENOUS at 06:35

## 2025-06-22 RX ADMIN — SODIUM CHLORIDE, PRESERVATIVE FREE 10 ML: 5 INJECTION INTRAVENOUS at 07:59

## 2025-06-22 RX ADMIN — PETROLATUM: 420 OINTMENT TOPICAL at 21:40

## 2025-06-22 RX ADMIN — SODIUM CHLORIDE, PRESERVATIVE FREE 10 ML: 5 INJECTION INTRAVENOUS at 21:40

## 2025-06-22 RX ADMIN — SULFAMETHOXAZOLE AND TRIMETHOPRIM 379.2 MG: 80; 16 INJECTION, SOLUTION, CONCENTRATE INTRAVENOUS at 13:32

## 2025-06-22 RX ADMIN — CHLORHEXIDINE GLUCONATE, 0.12% ORAL RINSE 15 ML: 1.2 SOLUTION DENTAL at 07:58

## 2025-06-22 RX ADMIN — ARFORMOTEROL TARTRATE 15 MCG: 15 SOLUTION RESPIRATORY (INHALATION) at 08:13

## 2025-06-22 RX ADMIN — SODIUM CHLORIDE, PRESERVATIVE FREE 40 MG: 5 INJECTION INTRAVENOUS at 07:58

## 2025-06-22 RX ADMIN — FUROSEMIDE 40 MG: 10 INJECTION, SOLUTION INTRAMUSCULAR; INTRAVENOUS at 14:33

## 2025-06-22 RX ADMIN — HEPARIN SODIUM 5000 UNITS: 5000 INJECTION INTRAVENOUS; SUBCUTANEOUS at 06:30

## 2025-06-22 RX ADMIN — ACETYLCYSTEINE 600 MG: 200 INHALANT RESPIRATORY (INHALATION) at 13:44

## 2025-06-22 RX ADMIN — LEVOTHYROXINE SODIUM 25 MCG: 0.03 TABLET ORAL at 06:36

## 2025-06-22 RX ADMIN — HEPARIN SODIUM 5000 UNITS: 5000 INJECTION INTRAVENOUS; SUBCUTANEOUS at 14:32

## 2025-06-22 RX ADMIN — FUROSEMIDE 40 MG: 10 INJECTION, SOLUTION INTRAMUSCULAR; INTRAVENOUS at 21:43

## 2025-06-22 RX ADMIN — ONDANSETRON 4 MG: 2 INJECTION, SOLUTION INTRAMUSCULAR; INTRAVENOUS at 08:54

## 2025-06-22 RX ADMIN — CHLORHEXIDINE GLUCONATE, 0.12% ORAL RINSE 15 ML: 1.2 SOLUTION DENTAL at 21:40

## 2025-06-22 RX ADMIN — PETROLATUM: 420 OINTMENT TOPICAL at 07:58

## 2025-06-22 RX ADMIN — AMIODARONE HYDROCHLORIDE 200 MG: 200 TABLET ORAL at 21:44

## 2025-06-22 RX ADMIN — SULFAMETHOXAZOLE AND TRIMETHOPRIM 379.2 MG: 80; 16 INJECTION, SOLUTION, CONCENTRATE INTRAVENOUS at 01:41

## 2025-06-22 RX ADMIN — ARFORMOTEROL TARTRATE 15 MCG: 15 SOLUTION RESPIRATORY (INHALATION) at 20:36

## 2025-06-22 RX ADMIN — ACETYLCYSTEINE 600 MG: 200 INHALANT RESPIRATORY (INHALATION) at 00:20

## 2025-06-22 RX ADMIN — ALBUTEROL SULFATE 2.5 MG: 2.5 SOLUTION RESPIRATORY (INHALATION) at 08:13

## 2025-06-22 RX ADMIN — HEPARIN SODIUM 5000 UNITS: 5000 INJECTION INTRAVENOUS; SUBCUTANEOUS at 21:41

## 2025-06-22 RX ADMIN — ACETYLCYSTEINE 600 MG: 200 INHALANT RESPIRATORY (INHALATION) at 08:12

## 2025-06-22 RX ADMIN — SODIUM CHLORIDE, PRESERVATIVE FREE 10 ML: 5 INJECTION INTRAVENOUS at 11:47

## 2025-06-22 RX ADMIN — ALBUTEROL SULFATE 2.5 MG: 2.5 SOLUTION RESPIRATORY (INHALATION) at 13:44

## 2025-06-22 RX ADMIN — AMIODARONE HYDROCHLORIDE 200 MG: 200 TABLET ORAL at 07:58

## 2025-06-22 ASSESSMENT — PULMONARY FUNCTION TESTS
PIF_VALUE: 33
PIF_VALUE: 32
PIF_VALUE: 31
PIF_VALUE: 34
PIF_VALUE: 28
PIF_VALUE: 30
PIF_VALUE: 36
PIF_VALUE: 31
PIF_VALUE: 28
PIF_VALUE: 33
PIF_VALUE: 31
PIF_VALUE: 30
PIF_VALUE: 27
PIF_VALUE: 27
PIF_VALUE: 32
PIF_VALUE: 39
PIF_VALUE: 31
PIF_VALUE: 32
PIF_VALUE: 31
PIF_VALUE: 30
PIF_VALUE: 41
PIF_VALUE: 26
PIF_VALUE: 44
PIF_VALUE: 31
PIF_VALUE: 31
PIF_VALUE: 29
PIF_VALUE: 30
PIF_VALUE: 27

## 2025-06-22 ASSESSMENT — PAIN SCALES - GENERAL
PAINLEVEL_OUTOF10: 0

## 2025-06-23 ENCOUNTER — APPOINTMENT (OUTPATIENT)
Dept: ULTRASOUND IMAGING | Age: 70
DRG: 870 | End: 2025-06-23
Payer: MEDICARE

## 2025-06-23 LAB
ALBUMIN SERPL-MCNC: 2.9 G/DL (ref 3.5–5.2)
ALP SERPL-CCNC: 89 U/L (ref 40–129)
ALT SERPL-CCNC: 21 U/L (ref 0–50)
ANION GAP SERPL CALCULATED.3IONS-SCNC: 13 MMOL/L (ref 7–16)
ANION GAP SERPL CALCULATED.3IONS-SCNC: 14 MMOL/L (ref 7–16)
AST SERPL-CCNC: 14 U/L (ref 0–50)
BILIRUB SERPL-MCNC: <0.2 MG/DL (ref 0–1.2)
BUN SERPL-MCNC: 43 MG/DL (ref 8–23)
BUN SERPL-MCNC: 45 MG/DL (ref 8–23)
C DIFF GDH + TOXINS A+B STL QL IA.RAPID: NEGATIVE
CALCIUM SERPL-MCNC: 7.5 MG/DL (ref 8.8–10.2)
CALCIUM SERPL-MCNC: 7.7 MG/DL (ref 8.8–10.2)
CHLORIDE SERPL-SCNC: 100 MMOL/L (ref 98–107)
CHLORIDE SERPL-SCNC: 103 MMOL/L (ref 98–107)
CO2 SERPL-SCNC: 24 MMOL/L (ref 22–29)
CO2 SERPL-SCNC: 24 MMOL/L (ref 22–29)
CREAT SERPL-MCNC: 2.3 MG/DL (ref 0.7–1.2)
CREAT SERPL-MCNC: 2.4 MG/DL (ref 0.7–1.2)
EKG ATRIAL RATE: 166 BPM
EKG Q-T INTERVAL: 336 MS
EKG QRS DURATION: 124 MS
EKG QTC CALCULATION (BAZETT): 551 MS
EKG R AXIS: 99 DEGREES
EKG T AXIS: -35 DEGREES
EKG VENTRICULAR RATE: 162 BPM
ERYTHROCYTE [DISTWIDTH] IN BLOOD BY AUTOMATED COUNT: 15.3 % (ref 11.5–15)
GFR, ESTIMATED: 28 ML/MIN/1.73M2
GFR, ESTIMATED: 30 ML/MIN/1.73M2
GLUCOSE SERPL-MCNC: 123 MG/DL (ref 74–99)
GLUCOSE SERPL-MCNC: 179 MG/DL (ref 74–99)
HCT VFR BLD AUTO: 26.9 % (ref 37–54)
HGB BLD-MCNC: 8.5 G/DL (ref 12.5–16.5)
MAGNESIUM SERPL-MCNC: 1.9 MG/DL (ref 1.6–2.4)
MAGNESIUM SERPL-MCNC: 2 MG/DL (ref 1.6–2.4)
MCH RBC QN AUTO: 30.4 PG (ref 26–35)
MCHC RBC AUTO-ENTMCNC: 31.6 G/DL (ref 32–34.5)
MCV RBC AUTO: 96.1 FL (ref 80–99.9)
PHOSPHATE SERPL-MCNC: 2.1 MG/DL (ref 2.5–4.5)
PLATELET, FLUORESCENCE: 216 K/UL (ref 130–450)
PMV BLD AUTO: ABNORMAL FL (ref 7–12)
POTASSIUM SERPL-SCNC: 3.4 MMOL/L (ref 3.5–5.1)
POTASSIUM SERPL-SCNC: 3.6 MMOL/L (ref 3.5–5.1)
PROT SERPL-MCNC: 4.7 G/DL (ref 6.4–8.3)
RBC # BLD AUTO: 2.8 M/UL (ref 3.8–5.8)
SODIUM SERPL-SCNC: 139 MMOL/L (ref 136–145)
SODIUM SERPL-SCNC: 141 MMOL/L (ref 136–145)
SPECIMEN DESCRIPTION: NORMAL
WBC OTHER # BLD: 32.3 K/UL (ref 4.5–11.5)

## 2025-06-23 PROCEDURE — 6370000000 HC RX 637 (ALT 250 FOR IP): Performed by: INTERNAL MEDICINE

## 2025-06-23 PROCEDURE — 97530 THERAPEUTIC ACTIVITIES: CPT

## 2025-06-23 PROCEDURE — 97110 THERAPEUTIC EXERCISES: CPT

## 2025-06-23 PROCEDURE — 87324 CLOSTRIDIUM AG IA: CPT

## 2025-06-23 PROCEDURE — 84100 ASSAY OF PHOSPHORUS: CPT

## 2025-06-23 PROCEDURE — 87205 SMEAR GRAM STAIN: CPT

## 2025-06-23 PROCEDURE — 94640 AIRWAY INHALATION TREATMENT: CPT

## 2025-06-23 PROCEDURE — 6360000002 HC RX W HCPCS: Performed by: INTERNAL MEDICINE

## 2025-06-23 PROCEDURE — 93010 ELECTROCARDIOGRAM REPORT: CPT | Performed by: INTERNAL MEDICINE

## 2025-06-23 PROCEDURE — 36415 COLL VENOUS BLD VENIPUNCTURE: CPT

## 2025-06-23 PROCEDURE — 80053 COMPREHEN METABOLIC PANEL: CPT

## 2025-06-23 PROCEDURE — 99232 SBSQ HOSP IP/OBS MODERATE 35: CPT | Performed by: STUDENT IN AN ORGANIZED HEALTH CARE EDUCATION/TRAINING PROGRAM

## 2025-06-23 PROCEDURE — 87040 BLOOD CULTURE FOR BACTERIA: CPT

## 2025-06-23 PROCEDURE — 87449 NOS EACH ORGANISM AG IA: CPT

## 2025-06-23 PROCEDURE — 87077 CULTURE AEROBIC IDENTIFY: CPT

## 2025-06-23 PROCEDURE — 2580000003 HC RX 258: Performed by: INTERNAL MEDICINE

## 2025-06-23 PROCEDURE — 2000000000 HC ICU R&B

## 2025-06-23 PROCEDURE — 2500000003 HC RX 250 WO HCPCS: Performed by: INTERNAL MEDICINE

## 2025-06-23 PROCEDURE — 2500000003 HC RX 250 WO HCPCS: Performed by: FAMILY MEDICINE

## 2025-06-23 PROCEDURE — 87070 CULTURE OTHR SPECIMN AEROBIC: CPT

## 2025-06-23 PROCEDURE — 85027 COMPLETE CBC AUTOMATED: CPT

## 2025-06-23 PROCEDURE — 2500000003 HC RX 250 WO HCPCS

## 2025-06-23 PROCEDURE — 2580000003 HC RX 258

## 2025-06-23 PROCEDURE — 6360000002 HC RX W HCPCS

## 2025-06-23 PROCEDURE — 6360000002 HC RX W HCPCS: Performed by: FAMILY MEDICINE

## 2025-06-23 PROCEDURE — 83735 ASSAY OF MAGNESIUM: CPT

## 2025-06-23 PROCEDURE — 6370000000 HC RX 637 (ALT 250 FOR IP)

## 2025-06-23 PROCEDURE — 94003 VENT MGMT INPAT SUBQ DAY: CPT

## 2025-06-23 PROCEDURE — 97166 OT EVAL MOD COMPLEX 45 MIN: CPT

## 2025-06-23 RX ORDER — MAGNESIUM SULFATE 1 G/100ML
1000 INJECTION INTRAVENOUS ONCE
Status: COMPLETED | OUTPATIENT
Start: 2025-06-23 | End: 2025-06-23

## 2025-06-23 RX ORDER — ACETYLCYSTEINE 200 MG/ML
600 SOLUTION ORAL; RESPIRATORY (INHALATION)
Status: COMPLETED | OUTPATIENT
Start: 2025-06-23 | End: 2025-06-24

## 2025-06-23 RX ORDER — BUDESONIDE 0.25 MG/2ML
250 INHALANT ORAL 2 TIMES DAILY
Qty: 60 EACH | Refills: 3 | Status: CANCELLED | OUTPATIENT
Start: 2025-06-23

## 2025-06-23 RX ORDER — AMIODARONE HYDROCHLORIDE 200 MG/1
200 TABLET ORAL 2 TIMES DAILY
Qty: 90 TABLET | Refills: 0 | Status: CANCELLED | OUTPATIENT
Start: 2025-06-23

## 2025-06-23 RX ORDER — ACETYLCYSTEINE 200 MG/ML
600 SOLUTION ORAL; RESPIRATORY (INHALATION)
Qty: 9 ML | Refills: 0 | Status: CANCELLED | OUTPATIENT
Start: 2025-06-23 | End: 2025-06-24

## 2025-06-23 RX ORDER — CHLORHEXIDINE GLUCONATE ORAL RINSE 1.2 MG/ML
15 SOLUTION DENTAL 2 TIMES DAILY
Qty: 420 ML | Refills: 0 | Status: CANCELLED | OUTPATIENT
Start: 2025-06-23 | End: 2025-07-07

## 2025-06-23 RX ORDER — LEVOTHYROXINE SODIUM 25 UG/1
25 TABLET ORAL DAILY
Qty: 30 TABLET | Refills: 3 | Status: CANCELLED | OUTPATIENT
Start: 2025-06-24

## 2025-06-23 RX ORDER — FUROSEMIDE 10 MG/ML
40 INJECTION INTRAMUSCULAR; INTRAVENOUS EVERY 8 HOURS
Status: COMPLETED | OUTPATIENT
Start: 2025-06-23 | End: 2025-06-24

## 2025-06-23 RX ORDER — ARFORMOTEROL TARTRATE 15 UG/2ML
15 SOLUTION RESPIRATORY (INHALATION)
Qty: 120 ML | Refills: 3 | Status: CANCELLED | OUTPATIENT
Start: 2025-06-23

## 2025-06-23 RX ADMIN — BUDESONIDE 250 MCG: 0.25 SUSPENSION RESPIRATORY (INHALATION) at 06:42

## 2025-06-23 RX ADMIN — ACETYLCYSTEINE 600 MG: 200 INHALANT RESPIRATORY (INHALATION) at 00:14

## 2025-06-23 RX ADMIN — CHLORHEXIDINE GLUCONATE, 0.12% ORAL RINSE 15 ML: 1.2 SOLUTION DENTAL at 19:35

## 2025-06-23 RX ADMIN — AMIODARONE HYDROCHLORIDE 150 MG: 50 INJECTION, SOLUTION INTRAVENOUS at 00:00

## 2025-06-23 RX ADMIN — ACETYLCYSTEINE 600 MG: 200 INHALANT RESPIRATORY (INHALATION) at 06:41

## 2025-06-23 RX ADMIN — ALBUTEROL SULFATE 2.5 MG: 2.5 SOLUTION RESPIRATORY (INHALATION) at 11:34

## 2025-06-23 RX ADMIN — ARFORMOTEROL TARTRATE 15 MCG: 15 SOLUTION RESPIRATORY (INHALATION) at 06:42

## 2025-06-23 RX ADMIN — POTASSIUM BICARBONATE 40 MEQ: 782 TABLET, EFFERVESCENT ORAL at 01:13

## 2025-06-23 RX ADMIN — SODIUM CHLORIDE, PRESERVATIVE FREE 10 ML: 5 INJECTION INTRAVENOUS at 07:46

## 2025-06-23 RX ADMIN — AMIODARONE HYDROCHLORIDE 200 MG: 200 TABLET ORAL at 20:03

## 2025-06-23 RX ADMIN — ALBUTEROL SULFATE 2.5 MG: 2.5 SOLUTION RESPIRATORY (INHALATION) at 20:27

## 2025-06-23 RX ADMIN — PETROLATUM: 420 OINTMENT TOPICAL at 19:34

## 2025-06-23 RX ADMIN — PHENYLEPHRINE HYDROCHLORIDE 15 MCG/MIN: 10 INJECTION INTRAVENOUS at 01:08

## 2025-06-23 RX ADMIN — BUDESONIDE 250 MCG: 0.25 SUSPENSION RESPIRATORY (INHALATION) at 20:27

## 2025-06-23 RX ADMIN — MIDAZOLAM HYDROCHLORIDE 2 MG: 1 INJECTION, SOLUTION INTRAMUSCULAR; INTRAVENOUS at 07:45

## 2025-06-23 RX ADMIN — ACETYLCYSTEINE 600 MG: 200 INHALANT RESPIRATORY (INHALATION) at 11:34

## 2025-06-23 RX ADMIN — SODIUM CHLORIDE, PRESERVATIVE FREE 10 ML: 5 INJECTION INTRAVENOUS at 19:34

## 2025-06-23 RX ADMIN — MAGNESIUM SULFATE HEPTAHYDRATE 1000 MG: 1 INJECTION, SOLUTION INTRAVENOUS at 01:17

## 2025-06-23 RX ADMIN — CHLORHEXIDINE GLUCONATE, 0.12% ORAL RINSE 15 ML: 1.2 SOLUTION DENTAL at 07:45

## 2025-06-23 RX ADMIN — SODIUM CHLORIDE, PRESERVATIVE FREE 40 MG: 5 INJECTION INTRAVENOUS at 07:45

## 2025-06-23 RX ADMIN — HEPARIN SODIUM 5000 UNITS: 5000 INJECTION INTRAVENOUS; SUBCUTANEOUS at 13:20

## 2025-06-23 RX ADMIN — HEPARIN SODIUM 5000 UNITS: 5000 INJECTION INTRAVENOUS; SUBCUTANEOUS at 20:03

## 2025-06-23 RX ADMIN — FUROSEMIDE 40 MG: 10 INJECTION, SOLUTION INTRAMUSCULAR; INTRAVENOUS at 15:42

## 2025-06-23 RX ADMIN — ALBUTEROL SULFATE 2.5 MG: 2.5 SOLUTION RESPIRATORY (INHALATION) at 06:42

## 2025-06-23 RX ADMIN — ARFORMOTEROL TARTRATE 15 MCG: 15 SOLUTION RESPIRATORY (INHALATION) at 20:27

## 2025-06-23 RX ADMIN — AMIODARONE HYDROCHLORIDE 0.5 MG/MIN: 1.8 INJECTION, SOLUTION INTRAVENOUS at 05:36

## 2025-06-23 RX ADMIN — POTASSIUM PHOSPHATE, MONOBASIC AND POTASSIUM PHOSPHATE, DIBASIC 15 MMOL: 224; 236 INJECTION, SOLUTION, CONCENTRATE INTRAVENOUS at 07:54

## 2025-06-23 RX ADMIN — PETROLATUM: 420 OINTMENT TOPICAL at 07:45

## 2025-06-23 RX ADMIN — SULFAMETHOXAZOLE AND TRIMETHOPRIM 379.2 MG: 80; 16 INJECTION, SOLUTION, CONCENTRATE INTRAVENOUS at 01:11

## 2025-06-23 RX ADMIN — SULFAMETHOXAZOLE AND TRIMETHOPRIM 379.2 MG: 80; 16 INJECTION, SOLUTION, CONCENTRATE INTRAVENOUS at 13:23

## 2025-06-23 RX ADMIN — LEVOTHYROXINE SODIUM 25 MCG: 0.03 TABLET ORAL at 05:34

## 2025-06-23 RX ADMIN — ACETYLCYSTEINE 600 MG: 200 INHALANT RESPIRATORY (INHALATION) at 20:27

## 2025-06-23 RX ADMIN — ALBUTEROL SULFATE 2.5 MG: 2.5 SOLUTION RESPIRATORY (INHALATION) at 00:14

## 2025-06-23 RX ADMIN — AMIODARONE HYDROCHLORIDE 0.5 MG/MIN: 1.8 INJECTION, SOLUTION INTRAVENOUS at 17:02

## 2025-06-23 RX ADMIN — HEPARIN SODIUM 5000 UNITS: 5000 INJECTION INTRAVENOUS; SUBCUTANEOUS at 05:34

## 2025-06-23 RX ADMIN — AMIODARONE HYDROCHLORIDE 1 MG/MIN: 1.8 INJECTION, SOLUTION INTRAVENOUS at 00:13

## 2025-06-23 RX ADMIN — ONDANSETRON 4 MG: 2 INJECTION, SOLUTION INTRAMUSCULAR; INTRAVENOUS at 20:45

## 2025-06-23 ASSESSMENT — PULMONARY FUNCTION TESTS
PIF_VALUE: 29
PIF_VALUE: 34
PIF_VALUE: 34
PIF_VALUE: 29
PIF_VALUE: 43
PIF_VALUE: 30
PIF_VALUE: 27
PIF_VALUE: 44
PIF_VALUE: 31
PIF_VALUE: 27
PIF_VALUE: 38
PIF_VALUE: 35
PIF_VALUE: 35
PIF_VALUE: 32
PIF_VALUE: 21
PIF_VALUE: 21
PIF_VALUE: 31
PIF_VALUE: 43
PIF_VALUE: 21
PIF_VALUE: 32
PIF_VALUE: 34
PIF_VALUE: 35
PIF_VALUE: 32
PIF_VALUE: 39
PIF_VALUE: 25
PIF_VALUE: 31
PIF_VALUE: 35
PIF_VALUE: 20
PIF_VALUE: 29
PIF_VALUE: 30
PIF_VALUE: 29
PIF_VALUE: 31
PIF_VALUE: 37
PIF_VALUE: 20
PIF_VALUE: 24
PIF_VALUE: 27
PIF_VALUE: 37
PIF_VALUE: 37
PIF_VALUE: 21
PIF_VALUE: 28
PIF_VALUE: 29
PIF_VALUE: 25

## 2025-06-23 ASSESSMENT — PAIN SCALES - GENERAL
PAINLEVEL_OUTOF10: 0

## 2025-06-23 NOTE — CARE COORDINATION
Transition of care update. Per Shaylee at [facility], they are able to accept when medically ready. Updated Charge RN. Patient is for an echo today. Await for ICU rounds with attending. Ambulance transport in an envelope on the soft chart. CM will follow.   Electronically signed by Edu Marroquin RN on 6/23/2025 at 8:53 AM    Addendum: Per RN, not ready for discharge Pt was in AF RVR last night, and also the elevated WBC count. [facility] updated. MB

## 2025-06-24 ENCOUNTER — APPOINTMENT (OUTPATIENT)
Age: 70
DRG: 870 | End: 2025-06-24
Attending: INTERNAL MEDICINE
Payer: MEDICARE

## 2025-06-24 ENCOUNTER — APPOINTMENT (OUTPATIENT)
Dept: ULTRASOUND IMAGING | Age: 70
DRG: 870 | End: 2025-06-24
Payer: MEDICARE

## 2025-06-24 ENCOUNTER — APPOINTMENT (OUTPATIENT)
Dept: GENERAL RADIOLOGY | Age: 70
DRG: 870 | End: 2025-06-24
Payer: MEDICARE

## 2025-06-24 VITALS
SYSTOLIC BLOOD PRESSURE: 120 MMHG | WEIGHT: 167 LBS | HEART RATE: 100 BPM | BODY MASS INDEX: 23.91 KG/M2 | TEMPERATURE: 97.9 F | RESPIRATION RATE: 15 BRPM | HEIGHT: 70 IN | DIASTOLIC BLOOD PRESSURE: 72 MMHG | OXYGEN SATURATION: 95 %

## 2025-06-24 LAB
ALBUMIN SERPL-MCNC: 2.8 G/DL (ref 3.5–5.2)
ALP SERPL-CCNC: 88 U/L (ref 40–129)
ALT SERPL-CCNC: 20 U/L (ref 0–50)
ANION GAP SERPL CALCULATED.3IONS-SCNC: 14 MMOL/L (ref 7–16)
AST SERPL-CCNC: 14 U/L (ref 0–50)
BASOPHILS # BLD: 0 K/UL (ref 0–0.2)
BASOPHILS NFR BLD: 0 % (ref 0–2)
BILIRUB SERPL-MCNC: <0.2 MG/DL (ref 0–1.2)
BUN SERPL-MCNC: 45 MG/DL (ref 8–23)
CALCIUM SERPL-MCNC: 7.4 MG/DL (ref 8.8–10.2)
CHLORIDE SERPL-SCNC: 98 MMOL/L (ref 98–107)
CO2 SERPL-SCNC: 25 MMOL/L (ref 22–29)
CREAT SERPL-MCNC: 2.1 MG/DL (ref 0.7–1.2)
ECHO AO SINUS VALSALVA DIAM: 3.3 CM
ECHO AO SINUS VALSALVA INDEX: 1.71 CM/M2
ECHO AV AREA PEAK VELOCITY: 2.3 CM2
ECHO AV AREA VTI: 2.8 CM2
ECHO AV AREA/BSA PEAK VELOCITY: 1.2 CM2/M2
ECHO AV AREA/BSA VTI: 1.5 CM2/M2
ECHO AV CUSP MM: 1.7 CM
ECHO AV MEAN GRADIENT: 4 MMHG
ECHO AV MEAN VELOCITY: 1 M/S
ECHO AV PEAK GRADIENT: 6 MMHG
ECHO AV PEAK VELOCITY: 1.3 M/S
ECHO AV VELOCITY RATIO: 0.69
ECHO AV VTI: 20.3 CM
ECHO EST RA PRESSURE: 3 MMHG
ECHO LA DIAMETER INDEX: 1.66 CM/M2
ECHO LA DIAMETER: 3.2 CM
ECHO LA VOL A-L A2C: 42 ML (ref 18–58)
ECHO LA VOL A-L A4C: 35 ML (ref 18–58)
ECHO LA VOL BP: 34 ML (ref 18–58)
ECHO LA VOL MOD A2C: 37 ML (ref 18–58)
ECHO LA VOL MOD A4C: 31 ML (ref 18–58)
ECHO LA VOL/BSA BIPLANE: 18 ML/M2 (ref 16–34)
ECHO LA VOLUME AREA LENGTH: 39 ML
ECHO LA VOLUME INDEX A-L A2C: 22 ML/M2 (ref 16–34)
ECHO LA VOLUME INDEX A-L A4C: 18 ML/M2 (ref 16–34)
ECHO LA VOLUME INDEX AREA LENGTH: 20 ML/M2 (ref 16–34)
ECHO LA VOLUME INDEX MOD A2C: 19 ML/M2 (ref 16–34)
ECHO LA VOLUME INDEX MOD A4C: 16 ML/M2 (ref 16–34)
ECHO LV EF PHYSICIAN: 60 %
ECHO LV FRACTIONAL SHORTENING: 29 % (ref 28–44)
ECHO LV INTERNAL DIMENSION DIASTOLE INDEX: 2.12 CM/M2
ECHO LV INTERNAL DIMENSION DIASTOLIC: 4.1 CM (ref 4.2–5.9)
ECHO LV INTERNAL DIMENSION SYSTOLIC INDEX: 1.5 CM/M2
ECHO LV INTERNAL DIMENSION SYSTOLIC: 2.9 CM
ECHO LV IVSD: 1 CM (ref 0.6–1)
ECHO LV IVSS: 1.1 CM
ECHO LV MASS 2D: 123 G (ref 88–224)
ECHO LV MASS INDEX 2D: 63.7 G/M2 (ref 49–115)
ECHO LV POSTERIOR WALL DIASTOLIC: 0.9 CM (ref 0.6–1)
ECHO LV POSTERIOR WALL SYSTOLIC: 1.1 CM
ECHO LV RELATIVE WALL THICKNESS RATIO: 0.44
ECHO LVOT AREA: 3.1 CM2
ECHO LVOT AV VTI INDEX: 0.89
ECHO LVOT DIAM: 2 CM
ECHO LVOT MEAN GRADIENT: 1 MMHG
ECHO LVOT PEAK GRADIENT: 4 MMHG
ECHO LVOT PEAK VELOCITY: 0.9 M/S
ECHO LVOT STROKE VOLUME INDEX: 29.4 ML/M2
ECHO LVOT SV: 56.8 ML
ECHO LVOT VTI: 18.1 CM
ECHO MV "A" WAVE DURATION: 94.2 MSEC
ECHO MV A VELOCITY: 0.88 M/S
ECHO MV AREA PHT: 3.5 CM2
ECHO MV AREA VTI: 2.7 CM2
ECHO MV E DECELERATION TIME (DT): 226.4 MS
ECHO MV E VELOCITY: 0.77 M/S
ECHO MV E/A RATIO: 0.88
ECHO MV LVOT VTI INDEX: 1.15
ECHO MV MAX VELOCITY: 1.1 M/S
ECHO MV MEAN GRADIENT: 2 MMHG
ECHO MV MEAN VELOCITY: 0.7 M/S
ECHO MV PEAK GRADIENT: 5 MMHG
ECHO MV PRESSURE HALF TIME (PHT): 63.6 MS
ECHO MV VTI: 20.9 CM
ECHO PV MAX VELOCITY: 1.2 M/S
ECHO PV MEAN GRADIENT: 3 MMHG
ECHO PV MEAN VELOCITY: 0.9 M/S
ECHO PV PEAK GRADIENT: 5 MMHG
ECHO PV VTI: 16.2 CM
ECHO PVEIN A DURATION: 108.5 MS
ECHO PVEIN A VELOCITY: 0.4 M/S
ECHO PVEIN PEAK D VELOCITY: 0.4 M/S
ECHO PVEIN PEAK S VELOCITY: 0.5 M/S
ECHO PVEIN S/D RATIO: 1.3
ECHO RIGHT VENTRICULAR SYSTOLIC PRESSURE (RVSP): 47 MMHG
ECHO RV INTERNAL DIMENSION: 4 CM
ECHO TV REGURGITANT MAX VELOCITY: 3.31 M/S
ECHO TV REGURGITANT PEAK GRADIENT: 44 MMHG
EOSINOPHIL # BLD: 0 K/UL (ref 0.05–0.5)
EOSINOPHILS RELATIVE PERCENT: 0 % (ref 0–6)
ERYTHROCYTE [DISTWIDTH] IN BLOOD BY AUTOMATED COUNT: 15.8 % (ref 11.5–15)
GFR, ESTIMATED: 33 ML/MIN/1.73M2
GLUCOSE SERPL-MCNC: 139 MG/DL (ref 74–99)
HCT VFR BLD AUTO: 27.8 % (ref 37–54)
HGB BLD-MCNC: 8.5 G/DL (ref 12.5–16.5)
INR PPP: 1
LYMPHOCYTES NFR BLD: 0.49 K/UL (ref 1.5–4)
LYMPHOCYTES RELATIVE PERCENT: 2 % (ref 20–42)
MAGNESIUM SERPL-MCNC: 1.8 MG/DL (ref 1.6–2.4)
MCH RBC QN AUTO: 29.2 PG (ref 26–35)
MCHC RBC AUTO-ENTMCNC: 30.6 G/DL (ref 32–34.5)
MCV RBC AUTO: 95.5 FL (ref 80–99.9)
MONOCYTES NFR BLD: 1.22 K/UL (ref 0.1–0.95)
MONOCYTES NFR BLD: 4 % (ref 2–12)
MYELOCYTES ABSOLUTE COUNT: 1.46 K/UL
MYELOCYTES: 5 %
NEUTROPHILS NFR BLD: 89 % (ref 43–80)
NEUTS SEG NFR BLD: 24.83 K/UL (ref 1.8–7.3)
NUCLEATED RED BLOOD CELLS: 1 PER 100 WBC
PARTIAL THROMBOPLASTIN TIME: 28.6 SEC (ref 24.5–35.1)
PHOSPHATE SERPL-MCNC: 2.9 MG/DL (ref 2.5–4.5)
PLATELET, FLUORESCENCE: 211 K/UL (ref 130–450)
PMV BLD AUTO: 13.8 FL (ref 7–12)
POTASSIUM SERPL-SCNC: 3.8 MMOL/L (ref 3.5–5.1)
PROT SERPL-MCNC: 4.7 G/DL (ref 6.4–8.3)
PROTHROMBIN TIME: 11.5 SEC (ref 9.3–12.4)
RBC # BLD AUTO: 2.91 M/UL (ref 3.8–5.8)
RBC # BLD: ABNORMAL 10*6/UL
SODIUM SERPL-SCNC: 137 MMOL/L (ref 136–145)
WBC OTHER # BLD: 28 K/UL (ref 4.5–11.5)

## 2025-06-24 PROCEDURE — 94640 AIRWAY INHALATION TREATMENT: CPT

## 2025-06-24 PROCEDURE — 2500000003 HC RX 250 WO HCPCS: Performed by: INTERNAL MEDICINE

## 2025-06-24 PROCEDURE — 93306 TTE W/DOPPLER COMPLETE: CPT

## 2025-06-24 PROCEDURE — 84100 ASSAY OF PHOSPHORUS: CPT

## 2025-06-24 PROCEDURE — 85730 THROMBOPLASTIN TIME PARTIAL: CPT

## 2025-06-24 PROCEDURE — 6360000002 HC RX W HCPCS: Performed by: INTERNAL MEDICINE

## 2025-06-24 PROCEDURE — 93306 TTE W/DOPPLER COMPLETE: CPT | Performed by: INTERNAL MEDICINE

## 2025-06-24 PROCEDURE — 6360000002 HC RX W HCPCS

## 2025-06-24 PROCEDURE — 6360000002 HC RX W HCPCS: Performed by: NURSE PRACTITIONER

## 2025-06-24 PROCEDURE — 80053 COMPREHEN METABOLIC PANEL: CPT

## 2025-06-24 PROCEDURE — 76700 US EXAM ABDOM COMPLETE: CPT

## 2025-06-24 PROCEDURE — 6360000002 HC RX W HCPCS: Performed by: FAMILY MEDICINE

## 2025-06-24 PROCEDURE — 71045 X-RAY EXAM CHEST 1 VIEW: CPT

## 2025-06-24 PROCEDURE — 93971 EXTREMITY STUDY: CPT

## 2025-06-24 PROCEDURE — 6370000000 HC RX 637 (ALT 250 FOR IP): Performed by: INTERNAL MEDICINE

## 2025-06-24 PROCEDURE — 85025 COMPLETE CBC W/AUTO DIFF WBC: CPT

## 2025-06-24 PROCEDURE — 6370000000 HC RX 637 (ALT 250 FOR IP)

## 2025-06-24 PROCEDURE — 94003 VENT MGMT INPAT SUBQ DAY: CPT

## 2025-06-24 PROCEDURE — 2500000003 HC RX 250 WO HCPCS

## 2025-06-24 PROCEDURE — 2580000003 HC RX 258: Performed by: INTERNAL MEDICINE

## 2025-06-24 PROCEDURE — 83735 ASSAY OF MAGNESIUM: CPT

## 2025-06-24 PROCEDURE — 2000000000 HC ICU R&B

## 2025-06-24 PROCEDURE — 85610 PROTHROMBIN TIME: CPT

## 2025-06-24 RX ORDER — SULFAMETHOXAZOLE AND TRIMETHOPRIM 80; 16 MG/ML; MG/ML
379 INJECTION INTRAVENOUS EVERY 12 HOURS
Qty: 663.6 ML | Refills: 0 | Status: ON HOLD | OUTPATIENT
Start: 2025-06-20 | End: 2025-07-04

## 2025-06-24 RX ORDER — FUROSEMIDE 10 MG/ML
40 INJECTION INTRAMUSCULAR; INTRAVENOUS EVERY 8 HOURS
Status: COMPLETED | OUTPATIENT
Start: 2025-06-24 | End: 2025-06-24

## 2025-06-24 RX ORDER — POTASSIUM CHLORIDE 29.8 MG/ML
20 INJECTION INTRAVENOUS ONCE
Status: COMPLETED | OUTPATIENT
Start: 2025-06-24 | End: 2025-06-24

## 2025-06-24 RX ORDER — PANTOPRAZOLE SODIUM 40 MG/1
40 TABLET, DELAYED RELEASE ORAL
Qty: 90 TABLET | Refills: 1 | Status: SHIPPED | OUTPATIENT
Start: 2025-06-24 | End: 2025-06-24 | Stop reason: HOSPADM

## 2025-06-24 RX ORDER — POLYETHYLENE GLYCOL 3350 17 G/17G
17 POWDER, FOR SOLUTION ORAL DAILY PRN
Status: ON HOLD | DISCHARGE
Start: 2025-06-24 | End: 2025-07-24

## 2025-06-24 RX ORDER — SULFAMETHOXAZOLE AND TRIMETHOPRIM 800; 160 MG/1; MG/1
1 TABLET ORAL 2 TIMES DAILY
Qty: 20 TABLET | Refills: 0 | Status: SHIPPED | OUTPATIENT
Start: 2025-06-24 | End: 2025-06-24 | Stop reason: HOSPADM

## 2025-06-24 RX ORDER — MAGNESIUM SULFATE IN WATER 40 MG/ML
2000 INJECTION, SOLUTION INTRAVENOUS ONCE
Status: COMPLETED | OUTPATIENT
Start: 2025-06-24 | End: 2025-06-24

## 2025-06-24 RX ORDER — AMIODARONE HYDROCHLORIDE 200 MG/1
200 TABLET ORAL 2 TIMES DAILY
Status: ON HOLD | DISCHARGE
Start: 2025-06-24

## 2025-06-24 RX ORDER — LEVOTHYROXINE SODIUM 25 UG/1
25 TABLET ORAL DAILY
Status: ON HOLD | DISCHARGE
Start: 2025-06-25

## 2025-06-24 RX ADMIN — Medication 250 MG: at 20:49

## 2025-06-24 RX ADMIN — POTASSIUM CHLORIDE 20 MEQ: 29.8 INJECTION, SOLUTION INTRAVENOUS at 06:11

## 2025-06-24 RX ADMIN — SODIUM CHLORIDE, PRESERVATIVE FREE 10 ML: 5 INJECTION INTRAVENOUS at 09:03

## 2025-06-24 RX ADMIN — CHLORHEXIDINE GLUCONATE, 0.12% ORAL RINSE 15 ML: 1.2 SOLUTION DENTAL at 20:50

## 2025-06-24 RX ADMIN — FUROSEMIDE 40 MG: 10 INJECTION, SOLUTION INTRAMUSCULAR; INTRAVENOUS at 13:46

## 2025-06-24 RX ADMIN — FUROSEMIDE 40 MG: 10 INJECTION, SOLUTION INTRAMUSCULAR; INTRAVENOUS at 20:49

## 2025-06-24 RX ADMIN — SODIUM CHLORIDE, PRESERVATIVE FREE 10 ML: 5 INJECTION INTRAVENOUS at 20:49

## 2025-06-24 RX ADMIN — SODIUM CHLORIDE, PRESERVATIVE FREE 40 MG: 5 INJECTION INTRAVENOUS at 08:58

## 2025-06-24 RX ADMIN — ALBUTEROL SULFATE 2.5 MG: 2.5 SOLUTION RESPIRATORY (INHALATION) at 07:50

## 2025-06-24 RX ADMIN — HEPARIN SODIUM 5000 UNITS: 5000 INJECTION INTRAVENOUS; SUBCUTANEOUS at 06:03

## 2025-06-24 RX ADMIN — AMIODARONE HYDROCHLORIDE 200 MG: 200 TABLET ORAL at 20:49

## 2025-06-24 RX ADMIN — HEPARIN SODIUM 5000 UNITS: 5000 INJECTION INTRAVENOUS; SUBCUTANEOUS at 13:24

## 2025-06-24 RX ADMIN — AMIODARONE HYDROCHLORIDE 0.5 MG/MIN: 1.8 INJECTION, SOLUTION INTRAVENOUS at 05:08

## 2025-06-24 RX ADMIN — CHLORHEXIDINE GLUCONATE, 0.12% ORAL RINSE 15 ML: 1.2 SOLUTION DENTAL at 09:00

## 2025-06-24 RX ADMIN — ARFORMOTEROL TARTRATE 15 MCG: 15 SOLUTION RESPIRATORY (INHALATION) at 19:54

## 2025-06-24 RX ADMIN — FUROSEMIDE 40 MG: 10 INJECTION, SOLUTION INTRAMUSCULAR; INTRAVENOUS at 01:15

## 2025-06-24 RX ADMIN — PETROLATUM: 420 OINTMENT TOPICAL at 09:03

## 2025-06-24 RX ADMIN — ALBUTEROL SULFATE 2.5 MG: 2.5 SOLUTION RESPIRATORY (INHALATION) at 00:20

## 2025-06-24 RX ADMIN — ONDANSETRON 4 MG: 2 INJECTION, SOLUTION INTRAMUSCULAR; INTRAVENOUS at 20:49

## 2025-06-24 RX ADMIN — MAGNESIUM SULFATE HEPTAHYDRATE 2000 MG: 40 INJECTION, SOLUTION INTRAVENOUS at 06:12

## 2025-06-24 RX ADMIN — ARFORMOTEROL TARTRATE 15 MCG: 15 SOLUTION RESPIRATORY (INHALATION) at 07:50

## 2025-06-24 RX ADMIN — BUDESONIDE 250 MCG: 0.25 SUSPENSION RESPIRATORY (INHALATION) at 07:50

## 2025-06-24 RX ADMIN — LEVOTHYROXINE SODIUM 25 MCG: 0.03 TABLET ORAL at 06:03

## 2025-06-24 RX ADMIN — SULFAMETHOXAZOLE AND TRIMETHOPRIM 379.2 MG: 80; 16 INJECTION, SOLUTION, CONCENTRATE INTRAVENOUS at 01:19

## 2025-06-24 RX ADMIN — HEPARIN SODIUM 5000 UNITS: 5000 INJECTION INTRAVENOUS; SUBCUTANEOUS at 20:49

## 2025-06-24 RX ADMIN — AMIODARONE HYDROCHLORIDE 200 MG: 200 TABLET ORAL at 09:00

## 2025-06-24 RX ADMIN — Medication 250 MG: at 09:41

## 2025-06-24 RX ADMIN — BUDESONIDE 250 MCG: 0.25 SUSPENSION RESPIRATORY (INHALATION) at 19:54

## 2025-06-24 RX ADMIN — ALBUTEROL SULFATE 2.5 MG: 2.5 SOLUTION RESPIRATORY (INHALATION) at 15:52

## 2025-06-24 RX ADMIN — ALBUTEROL SULFATE 2.5 MG: 2.5 SOLUTION RESPIRATORY (INHALATION) at 19:59

## 2025-06-24 RX ADMIN — PETROLATUM: 420 OINTMENT TOPICAL at 20:49

## 2025-06-24 RX ADMIN — SULFAMETHOXAZOLE AND TRIMETHOPRIM 379.2 MG: 80; 16 INJECTION, SOLUTION, CONCENTRATE INTRAVENOUS at 13:22

## 2025-06-24 RX ADMIN — ACETYLCYSTEINE 600 MG: 200 INHALANT RESPIRATORY (INHALATION) at 00:20

## 2025-06-24 ASSESSMENT — PULMONARY FUNCTION TESTS
PIF_VALUE: 38
PIF_VALUE: 36
PIF_VALUE: 37
PIF_VALUE: 20
PIF_VALUE: 33
PIF_VALUE: 38
PIF_VALUE: 32
PIF_VALUE: 35
PIF_VALUE: 36
PIF_VALUE: 38
PIF_VALUE: 49
PIF_VALUE: 34
PIF_VALUE: 41
PIF_VALUE: 36
PIF_VALUE: 30
PIF_VALUE: 32
PIF_VALUE: 44
PIF_VALUE: 17
PIF_VALUE: 37
PIF_VALUE: 33
PIF_VALUE: 31
PIF_VALUE: 38
PIF_VALUE: 36
PIF_VALUE: 35
PIF_VALUE: 32
PIF_VALUE: 36
PIF_VALUE: 41
PIF_VALUE: 17
PIF_VALUE: 37
PIF_VALUE: 18
PIF_VALUE: 41

## 2025-06-24 ASSESSMENT — PAIN SCALES - GENERAL
PAINLEVEL_OUTOF10: 0

## 2025-06-24 NOTE — DISCHARGE INSTR - COC
Continuity of Care Form    Patient Name: Frank Niño   :  1955  MRN:  [id number]    Admit date:  6/15/2025  Discharge date:  ***    Code Status Order: Full Code   Advance Directives:     Admitting Physician:  No admitting provider for patient encounter.  PCP: No primary care provider on file.    Discharging Nurse: ***  Discharging Hospital Unit/Room#: 4407/4407-A  Discharging Unit Phone Number: ***    Emergency Contact:   Extended Emergency Contact Information  Primary Emergency Contact: Kathi Helm  Mobile Phone: [phone]  Relation: Spouse   needed? No  Secondary Emergency Contact: Mary Niño  Mobile Phone: [phone]  Relation: Child    Past Surgical History:  No past surgical history on file.    Immunization History:     There is no immunization history on file for this patient.    Active Problems:  Patient Active Problem List   Diagnosis Code    Acute hypoxic respiratory failure (HCC) J96.01    Septic shock ([facility]) A41.9, R65.21    Pneumonia of right lung due to infectious organism J18.9    Paroxysmal atrial fibrillation ([facility]) I48.0    Pleural effusion on right J90    Severe protein-calorie malnutrition E43    Palliative care encounter Z51.5       Isolation/Infection:   Isolation            No Isolation          Patient Infection Status    No active infections.   Resolved       Infection Onset Added Last Indicated Last Indicated By Resolved Resolved By    Influenza 25 Respiratory Panel, Molecular, with COVID-19 (Restricted: peds pts or suitable admitted adults) 02/10/25 history Infection                          Nurse Assessment:  Last Vital Signs: /65   Pulse 87   Temp 97.8 °F (36.6 °C) (Oral)   Resp 18   Ht 1.778 m (5' 10\")   Wt 75.8 kg (167 lb)   SpO2 96%   BMI 23.96 kg/m²     Last documented pain score (0-10 scale): Pain Level: 0  Last Weight:   Wt Readings from Last 1 Encounters:   25 75.8 kg (167 lb)     Mental Status:  {IP PT   Symptoms

## 2025-06-24 NOTE — PROGRESS NOTES
[facility]  [facility]  Progress Note    Chief complain:  pneumonia , resp failure     SUBJECTIVE:    All above noted   Pt is ventilated   Awake and alert   Afebrile   Off neosynephrine       Current Facility-Administered Medications   Medication Dose Route Frequency Provider Last Rate Last Admin    piperacillin-tazobactam (ZOSYN) 4,500 mg in sodium chloride 0.9 % 100 mL IVPB ([id number])  4,500 mg IntraVENous Q8H Iggy Espana MD 25 mL/hr at 06/20/25 1018 4,500 mg at 06/20/25 1018    levothyroxine (SYNTHROID) tablet 25 mcg  25 mcg Oral Daily Iggy Espana MD        ipratropium 0.5 mg-albuterol 2.5 mg (DUONEB) nebulizer solution 1 Dose  1 Dose Inhalation Q4H PRN Karen Alanis APRN - CNP   1 Dose at 06/20/25 0344    amiodarone (NEXTERONE) 360 mg in dextrose 5% 200 ml  0.5 mg/min IntraVENous Continuous Karen Alanis APRN - CNP 16.7 mL/hr at 06/20/25 0700 0.5 mg/min at 06/20/25 0700    chlorhexidine (PERIDEX) 0.12 % solution 15 mL  15 mL Mouth/Throat BID Iggy Espana MD   15 mL at 06/20/25 0826    fentaNYL (SUBLIMAZE) injection 50 mcg  50 mcg IntraVENous Q1H PRN Iggy Espana MD   50 mcg at 06/19/25 1040    midazolam PF (VERSED) injection 2 mg  2 mg IntraVENous Q4H PRN Iggy Espana MD   2 mg at 06/19/25 0940    acetylcysteine (MUCOMYST) 20 % solution 600 mg  600 mg Inhalation Q4H Candido Landa MD   600 mg at 06/20/25 0836    sodium chloride (OCEAN, BABY AYR) 0.65 % nasal spray 1 spray  1 spray Each Nostril Q2H PRN Candido Landa MD        pantoprazole (PROTONIX) 40 mg in sodium chloride (PF) 0.9 % 10 mL injection  40 mg IntraVENous Daily Candido Landa MD   40 mg at 06/20/25 0826    sodium chloride flush 0.9 % injection 5-40 mL  5-40 mL IntraVENous 2 times per day Eagle Bales, DO   10 mL at 06/20/25 0827    sodium chloride flush 0.9 % injection 5-40 mL  5-40 mL IntraVENous PRN Eagle Bales, DO        0.9 % sodium chloride infusion   
        [facility]  [facility]  Progress Note    Chief complain:  pneumonia , resp failure     SUBJECTIVE:    Patient is awake, alert, in resp distress  Denies fever or chills  Afebrile     Current Facility-Administered Medications   Medication Dose Route Frequency Provider Last Rate Last Admin    sodium chloride (OCEAN, BABY AYR) 0.65 % nasal spray 1 spray  1 spray Each Nostril Q2H PRN Candido Landa MD        pantoprazole (PROTONIX) 40 mg in sodium chloride (PF) 0.9 % 10 mL injection  40 mg IntraVENous Daily Candido Landa MD   40 mg at 06/17/25 0847    norepinephrine (LEVOPHED) 16 mg in sodium chloride 0.9 % 250 mL infusion (premix)  1-100 mcg/min IntraVENous Continuous Oj Funes MD 1.9 mL/hr at 06/17/25 0847 2 mcg/min at 06/17/25 0847    piperacillin-tazobactam (ZOSYN) 4,500 mg in sodium chloride 0.9 % 100 mL IVPB ([id number])  4,500 mg IntraVENous Q12H Eagle Bales DO   Stopped at 06/17/25 0530    sodium chloride flush 0.9 % injection 5-40 mL  5-40 mL IntraVENous 2 times per day Eagle Bales DO   10 mL at 06/17/25 0848    sodium chloride flush 0.9 % injection 5-40 mL  5-40 mL IntraVENous PRN Eagle Bales DO        0.9 % sodium chloride infusion   IntraVENous PRN Eagle Bales DO        heparin (porcine) injection 5,000 Units  5,000 Units SubCUTAneous 3 times per day Eagle Bales DO   5,000 Units at 06/17/25 0555    ondansetron (ZOFRAN-ODT) disintegrating tablet 4 mg  4 mg Oral Q8H PRN Eagle Bales DO        Or    ondansetron (ZOFRAN) injection 4 mg  4 mg IntraVENous Q6H PRN Eagle Bales DO        polyethylene glycol (GLYCOLAX) packet 17 g  17 g Oral Daily PRN Eagle Bales DO        acetaminophen (TYLENOL) tablet 650 mg  650 mg Oral Q6H PRN Eagle Bales DO        Or    acetaminophen (TYLENOL) suppository 650 mg  650 mg Rectal Q6H PRN Eagle Bales DO        hydrocortisone sodium succinate PF (SOLU-CORTEF) 100 mg in sterile water 
        [facility]  [facility]  Progress Note    Chief complain:  pneumonia , resp failure     SUBJECTIVE:    All above noted   Pt is ventilated   Awake and alert   Afebrile       Current Facility-Administered Medications   Medication Dose Route Frequency Provider Last Rate Last Admin    amiodarone (CORDARONE) tablet 200 mg  200 mg Oral BID Iggy Espana MD   200 mg at 06/22/25 0758    albuterol (PROVENTIL) (2.5 MG/3ML) 0.083% nebulizer solution 2.5 mg  2.5 mg Nebulization Q6H Myesha Howell MD   2.5 mg at 06/22/25 0813    acetylcysteine (MUCOMYST) 20 % solution 600 mg  600 mg Inhalation Q6H RT Myesha Howell MD   600 mg at 06/22/25 0812    levothyroxine (SYNTHROID) tablet 25 mcg  25 mcg Oral Daily Iggy Espana MD   25 mcg at 06/22/25 0636    sulfamethoxazole-trimethoprim (BACTRIM) 379.2 mg in dextrose 5 % 500 mL IVPB  5 mg/kg IntraVENous Q12H Trey Marshall MD   Stopped at 06/22/25 0256    ipratropium 0.5 mg-albuterol 2.5 mg (DUONEB) nebulizer solution 1 Dose  1 Dose Inhalation Q4H PRN Karen Alanis APRN - CNP   1 Dose at 06/21/25 0327    chlorhexidine (PERIDEX) 0.12 % solution 15 mL  15 mL Mouth/Throat BID Iggy Espana MD   15 mL at 06/22/25 0758    fentaNYL (SUBLIMAZE) injection 50 mcg  50 mcg IntraVENous Q1H PRN Iggy Espana MD   50 mcg at 06/19/25 1040    midazolam PF (VERSED) injection 2 mg  2 mg IntraVENous Q4H PRN Iggy Espana MD   2 mg at 06/21/25 1635    sodium chloride (OCEAN, BABY AYR) 0.65 % nasal spray 1 spray  1 spray Each Nostril Q2H PRN Candido Landa MD        pantoprazole (PROTONIX) 40 mg in sodium chloride (PF) 0.9 % 10 mL injection  40 mg IntraVENous Daily Candido Landa MD   40 mg at 06/22/25 0758    sodium chloride flush 0.9 % injection 5-40 mL  5-40 mL IntraVENous 2 times per day Eagle Bales, DO   10 mL at 06/22/25 0759    sodium chloride flush 0.9 % injection 5-40 mL  5-40 mL IntraVENous PRN Eagle Bales, DO        0.9 % 
        [facility]  [facility]  Progress Note    Chief complain:  pneumonia , resp failure     SUBJECTIVE:    All above noted   Pt was placed on Vent   Afebrile   On neosynephrine       Current Facility-Administered Medications   Medication Dose Route Frequency Provider Last Rate Last Admin    ipratropium 0.5 mg-albuterol 2.5 mg (DUONEB) nebulizer solution 1 Dose  1 Dose Inhalation Q4H PRN Karen Alanis, APRN - CNP   1 Dose at 06/18/25 0139    phenylephrine (KEI-SYNEPHRINE) 50 mg in sodium chloride 0.9 % 250 mL infusion   mcg/min IntraVENous Continuous Karen Alanis, APRN - CNP 12 mL/hr at 06/18/25 1011 40 mcg/min at 06/18/25 1011    amiodarone (NEXTERONE) 360 mg in dextrose 5% 200 ml  0.5 mg/min IntraVENous Continuous Karen Alanis, APRN - CNP 16.7 mL/hr at 06/18/25 0913 0.5 mg/min at 06/18/25 0913    sodium bicarbonate 150 mEq in dextrose 5 % 1,000 mL infusion   IntraVENous Continuous Karen Alanis, APRN -  mL/hr at 06/18/25 0649 Rate Change at 06/18/25 0649    VASOpressin 20 Units in sodium chloride 0.9 % 100 mL IVPB ([id number])  0.04 Units/min IntraVENous Continuous Iggy Espana MD        chlorhexidine (PERIDEX) 0.12 % solution 15 mL  15 mL Mouth/Throat BID Iggy Espana MD        acetylcysteine (MUCOMYST) 20 % solution 600 mg  600 mg Inhalation Q4H Candido Landa MD   600 mg at 06/18/25 0855    midazolam (VERSED) 100mg/100mL in NS infusion  1-10 mg/hr IntraVENous Continuous Candido Landa MD 3 mL/hr at 06/18/25 0921 3 mg/hr at 06/18/25 0921    fentaNYL (SUBLIMAZE) 1,000 mcg in sodium chloride 0.9% 100 mL infusion   mcg/hr IntraVENous Continuous Candido Landa MD 5 mL/hr at 06/18/25 0815 50 mcg/hr at 06/18/25 0815    sodium chloride (OCEAN, BABY AYR) 0.65 % nasal spray 1 spray  1 spray Each Nostril Q2H PRN Candido Landa MD        pantoprazole (PROTONIX) 40 mg in sodium chloride (PF) 0.9 % 10 mL injection  40 mg 
        [facility]  [facility]  Progress Note    Chief complain:  pneumonia , resp failure     SUBJECTIVE:    All above noted   Pt was placed on Vent   Afebrile   Off neosynephrine       Current Facility-Administered Medications   Medication Dose Route Frequency Provider Last Rate Last Admin    amiodarone (CORDARONE) 150 mg in dextrose 5 % 100 mL bolus ([id number])  150 mg IntraVENous Once Iggy Espana MD        calcium gluconate 2,000 mg in sodium chloride 100 mL  2,000 mg IntraVENous Once Iggy Espana MD        potassium chloride 40 mEq in 100 mL IVPB (Central Line)  40 mEq IntraVENous Once Iggy Espana MD        ipratropium 0.5 mg-albuterol 2.5 mg (DUONEB) nebulizer solution 1 Dose  1 Dose Inhalation Q4H PRN Karen Alanis APRN - CNP   1 Dose at 06/19/25 0332    amiodarone (NEXTERONE) 360 mg in dextrose 5% 200 ml  0.5 mg/min IntraVENous Continuous Karen Alanis APRN - CNP 16.7 mL/hr at 06/19/25 0800 0.5 mg/min at 06/19/25 0800    chlorhexidine (PERIDEX) 0.12 % solution 15 mL  15 mL Mouth/Throat BID Iggy Espana MD   15 mL at 06/19/25 0818    lactated ringers infusion   IntraVENous Continuous Ruben Art  mL/hr at 06/19/25 0631 Rate Verify at 06/19/25 0631    fentaNYL (SUBLIMAZE) injection 50 mcg  50 mcg IntraVENous Q1H PRN Iggy Espana MD        midazolam PF (VERSED) injection 2 mg  2 mg IntraVENous Q4H PRN Iggy Espana MD   2 mg at 06/19/25 0940    VASOpressin 20 Units in sodium chloride 0.9 % 100 mL IVPB ([id number])  0.04 Units/min IntraVENous Continuous Iggy Espana MD   Held at 06/18/25 1549    acetylcysteine (MUCOMYST) 20 % solution 600 mg  600 mg Inhalation Q4H Candido Landa MD   600 mg at 06/19/25 0842    sodium chloride (OCEAN, BABY AYR) 0.65 % nasal spray 1 spray  1 spray Each Nostril Q2H PRN Candido Landa MD        pantoprazole (PROTONIX) 40 mg in sodium chloride (PF) 0.9 % 10 mL injection  40 mg IntraVENous Daily Syeda 
        [facility]  [facility]  Progress Note    Chief complain:  pneumonia     SUBJECTIVE:    Patient is awake, alert , tolerating antibiotics well     ROS:  Constitutional:  denies fever , chills   Cardiovascular: denies chest pain or palpitation   Gastrointestinal: . Denies abdominal pain, diarrhea, no nausea or vomiting  Genitourinary:      Denies  dysuria or burning micturition  Musculoskeletal: no aches or pain   Allergic/Immunologic:  No rash or itching     OBJECTIVE:    Vitals:    06/16/25 1200 06/16/25 1300 06/16/25 1303 06/16/25 1400   BP: (!) 104/43 (!) 131/50  (!) 108/43   Pulse: 90 99 (!) 101    Resp: 17 20 22    Temp: 98.4 °F (36.9 °C)      TempSrc: Bladder      SpO2: 100% 100% 100%    Weight:       Height:           HEENT :         unremarkable   Heart:             RRR,  No murmurs, no gallops  Lungs:            bilateral rhonchi  Abdomen:       soft, non tender, bowel sounds present  Extremites:     No edema, no ulcers,  Skin:                Normal turgor,normal texture  Lines :             peripheral              Malone catheter :   absent  Wound : none present                        Current Facility-Administered Medications   Medication Dose Route Frequency Provider Last Rate Last Admin    sodium chloride (OCEAN, BABY AYR) 0.65 % nasal spray 1 spray  1 spray Each Nostril Q2H PRN Candido Landa MD        pantoprazole (PROTONIX) 40 mg in sodium chloride (PF) 0.9 % 10 mL injection  40 mg IntraVENous Daily Candido Landa MD   40 mg at 06/16/25 1238    norepinephrine (LEVOPHED) 16 mg in sodium chloride 0.9 % 250 mL infusion (premix)  1-100 mcg/min IntraVENous Continuous Oj Funes MD 6.6 mL/hr at 06/16/25 1108 7 mcg/min at 06/16/25 1108    piperacillin-tazobactam (ZOSYN) 4,500 mg in sodium chloride 0.9 % 100 mL IVPB ([id number])  4,500 mg IntraVENous Q12H Eagle Bales DO   Stopped at 06/16/25 1253    sodium chloride flush 0.9 % injection 5-40 mL  5-40 mL 
        [facility]  [facility]  Progress Note    Chief complain:  pneumonia , resp failure     SUBJECTIVE:    All above noted   Pt is ventilated   Awake and alert   Afebrile       Current Facility-Administered Medications   Medication Dose Route Frequency Provider Last Rate Last Admin    amiodarone (CORDARONE) tablet 200 mg  200 mg Oral BID Iggy Espana MD        levothyroxine (SYNTHROID) tablet 25 mcg  25 mcg Oral Daily Iggy Espana MD   25 mcg at 06/21/25 0551    sulfamethoxazole-trimethoprim (BACTRIM) 379.2 mg in dextrose 5 % 500 mL IVPB  5 mg/kg IntraVENous Q12H Trey Marshall MD   Stopped at 06/21/25 0315    ipratropium 0.5 mg-albuterol 2.5 mg (DUONEB) nebulizer solution 1 Dose  1 Dose Inhalation Q4H PRN Karen Alanis APRN - CNP   1 Dose at 06/21/25 0327    chlorhexidine (PERIDEX) 0.12 % solution 15 mL  15 mL Mouth/Throat BID Iggy Espana MD   15 mL at 06/21/25 0956    fentaNYL (SUBLIMAZE) injection 50 mcg  50 mcg IntraVENous Q1H PRN Iggy Espana MD   50 mcg at 06/19/25 1040    midazolam PF (VERSED) injection 2 mg  2 mg IntraVENous Q4H PRN Iggy Espana MD   2 mg at 06/21/25 1015    acetylcysteine (MUCOMYST) 20 % solution 600 mg  600 mg Inhalation Q4H Candido Landa MD   600 mg at 06/21/25 0820    sodium chloride (OCEAN, BABY AYR) 0.65 % nasal spray 1 spray  1 spray Each Nostril Q2H PRN Candido Landa MD        pantoprazole (PROTONIX) 40 mg in sodium chloride (PF) 0.9 % 10 mL injection  40 mg IntraVENous Daily Candido Landa MD   40 mg at 06/21/25 0956    sodium chloride flush 0.9 % injection 5-40 mL  5-40 mL IntraVENous 2 times per day Eagle Bales DO   10 mL at 06/21/25 0956    sodium chloride flush 0.9 % injection 5-40 mL  5-40 mL IntraVENous PRN Eagle Bales, DO        0.9 % sodium chloride infusion   IntraVENous PRN Eagle Bales, DO        heparin (porcine) injection 5,000 Units  5,000 Units SubCUTAneous 3 times per day Eagle Bales 
       [facility] Hospitalist Progress Note    Admitting Date and Time: 6/15/2025  1:19 AM  Admit Dx: Pleural effusion on right [J90]  Septic shock (HCC) [A41.9, R65.21]  Pneumonia of right lung due to infectious organism, unspecified part of lung [J18.9]  Sepsis with acute hypoxic respiratory failure, due to unspecified organism, unspecified whether septic shock present (HCC) [A41.9, R65.20, J96.01]  Acute hypoxic respiratory failure (HCC) [J96.01]    Synopsis: Patient is a 69 year old male with hx of adenocacinoma of the lung chronic respiratory failure, previously trache dependent but weaned to nasal cannula. He came from a SNF for shortness of breath and hypoxia, was found to have a right lower lobe pneumonia, right pleural effusion and in septic shock.  Patient was started on pressors, broad-spectrum antibiotics.  Infectious diseases following.  He also has H3 DARREN for which nephrology is following patient admitted to the MICU.  Pt was intubated on 6/17 given respiratory failure  6/18-pt developed a fib; back to nsr ; EP consulted; on  amiodarone drip; levophed switched to aarti  6/19-pressor weaned off; still on amio drip for a fib rvr  6/20-off sedation; pt awake; undergoing sbt  6/21-pt awake and interactive ; amio drip transistioned to po; still heart rate is in 110s; echo pending  6/22- leukocytosis -uptrending 26 K today; us abdomen to rule out cholecystitis  6/23-aarti synephrine started overnight ; on amio drip; echo pending; wbc 32K; us abdomen remains pending        Subjective:  Patient is being followed for Pleural effusion on right [J90]  Septic shock (HCC) [A41.9, R65.21]  Pneumonia of right lung due to infectious organism, unspecified part of lung [J18.9]  Sepsis with acute hypoxic respiratory failure, due to unspecified organism, unspecified whether septic shock present (HCC) [A41.9, R65.20, J96.01]  Acute hypoxic respiratory failure (HCC) [J96.01]   Seen and examined at bedside      ROS: could not 
       [facility] Hospitalist Progress Note    Admitting Date and Time: 6/15/2025  1:19 AM  Admit Dx: Pleural effusion on right [J90]  Septic shock (HCC) [A41.9, R65.21]  Pneumonia of right lung due to infectious organism, unspecified part of lung [J18.9]  Sepsis with acute hypoxic respiratory failure, due to unspecified organism, unspecified whether septic shock present (HCC) [A41.9, R65.20, J96.01]  Acute hypoxic respiratory failure (HCC) [J96.01]    Synopsis: Patient is a 69 year old male with hx of adenocacinoma of the lung chronic respiratory failure, previously trache dependent but weaned to nasal cannula. He came from a SNF for shortness of breath and hypoxia, was found to have a right lower lobe pneumonia, right pleural effusion and in septic shock.  Patient was started on pressors, broad-spectrum antibiotics.  Infectious diseases following.  He also has H3 DARREN for which nephrology is following patient admitted to the MICU.  Pt was intubated on 6/17 given respiratory failure    Subjective:  Patient is being followed for Pleural effusion on right [J90]  Septic shock (HCC) [A41.9, R65.21]  Pneumonia of right lung due to infectious organism, unspecified part of lung [J18.9]  Sepsis with acute hypoxic respiratory failure, due to unspecified organism, unspecified whether septic shock present (HCC) [A41.9, R65.20, J96.01]  Acute hypoxic respiratory failure (HCC) [J96.01]   Seen and examined at bedside      ROS: could not be obtained     acetylcysteine  600 mg Inhalation Q4H    pantoprazole (PROTONIX) 40 mg in sodium chloride (PF) 0.9 % 10 mL injection  40 mg IntraVENous Daily    piperacillin-tazobactam  4,500 mg IntraVENous Q12H    sodium chloride flush  5-40 mL IntraVENous 2 times per day    heparin (porcine)  5,000 Units SubCUTAneous 3 times per day    hydrocortisone sodium succinate PF (SOLU-CORTEF) 100 mg in sterile water 2 mL injection  100 mg IntraVENous Q8H    arformoterol tartrate  15 mcg Nebulization 
       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 He came from a SNF for shortness of breath and hypoxia, was found to have a right lower lobe pneumonia, right pleural effusion and in septic shock.  Patient was started on pressors, broad-spectrum antibiotics.  Infectious diseases following.  He also has H3 DARREN for which nephrology is following patient admitted to the MICU.  Pt was intubated on 6/17 given respiratory failure  6/18-pt developed a fib; back to nsr ; EP consulted; on  amiodarone drip; levophed switched to aarti  6/19-pressor weaned off; still on amio drip for a fib rvr  6/20-off sedation; pt awake; undergoing sbt      Subjective:  Patient is being followed for Pleural effusion on right [J90]  Septic shock (HCC) [A41.9, R65.21]  Pneumonia of right lung due to infectious organism, unspecified part of lung [J18.9]  Sepsis with acute hypoxic respiratory failure, due to unspecified organism, unspecified whether septic shock present (HCC) [A41.9, R65.20, J96.01]  Acute hypoxic respiratory failure (HCC) [J96.01]   Seen and examined at bedside      ROS: could not be obtained     levothyroxine  25 mcg Oral Daily    sulfamethoxazole-trimethoprim (BACTRIM) 379.2 mg in dextrose 5 % 500 mL IVPB  5 mg/kg IntraVENous Q12H    chlorhexidine  15 mL Mouth/Throat BID    acetylcysteine  600 mg Inhalation Q4H    pantoprazole (PROTONIX) 40 mg in sodium chloride (PF) 
       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 He came from a SNF for shortness of breath and hypoxia, was found to have a right lower lobe pneumonia, right pleural effusion and in septic shock.  Patient was started on pressors, broad-spectrum antibiotics.  Infectious diseases following.  He also has H3 DARREN for which nephrology is following patient admitted to the MICU.    Subjective:  Patient is being followed for Pleural effusion on right [J90]  Septic shock (HCC) [A41.9, R65.21]  Pneumonia of right lung due to infectious organism, unspecified part of lung [J18.9]  Sepsis with acute hypoxic respiratory failure, due to unspecified organism, unspecified whether septic shock present (HCC) [A41.9, R65.20, J96.01]  Acute hypoxic respiratory failure (HCC) [J96.01]   Seen and examined at bedside  Currently on Airvo saturating percent  Says since he has been on nasal cannula he is like he has a lot of crusting which cleared off overnight and has helped him breathe better through the nasal cannula  Otherwise no pain or other issues    ROS: denies fever, chills, cp, sob, n/v, HA unless stated above.      pantoprazole (PROTONIX) 40 mg in sodium chloride (PF) 0.9 % 10 mL injection  40 mg IntraVENous Daily    piperacillin-tazobactam  4,500 mg IntraVENous Q12H    sodium chloride flush  5-40 mL IntraVENous 2 times per day    heparin (porcine)  5,000 Units 
       [facility]             Pulmonary & Critical Care Medicine                MICU Progress Note                 Written by: Candido Landa MD  Name: Frank Niño : 1955       Age: 69 y.o. MR/Act #    : [id number],  Billing  #    : [id number]   Admit Date: 6/15/2025  1:19 AM LOS: 1,   Hospital Day: 2 Room #      : 4407/4407-A   PCP            : No primary care provider on file.,   Referred by: Gloria Brown * ICU Attending: MD Maggi Simpson date: 2025 11:52 AM   ICU Days:      2 Vent Days:     0 LOS: 1,                          Reason for ICU admission           Chief Complaint   Patient presents with    Respiratory Distress     From NH increased sob 75% on 10L HF at facility arrived on c-pap. Current ATB use. Capped trach                           Brief HPI, Presentation & Synopsis                       69-year-old male with past medical history of non-small lung cancer, status post radiation right lower lobe, with brain mets, status post stereotactic gamma knife surgery in the right frontal lobe for metastatic disease , status post trach at [facility] which is currently capped, presented from [facility] with shortness of breath.  Found to be in septic shock.  Right lower lobe pneumonia noted in the CT chest, Pleurx catheter in situ.  DARREN with hyperkalemia noted    Active problem list of yesterday:  Active Hospital Problems    Diagnosis Date Noted    Septic shock (HCC) [A41.9, R65.21] 06/15/2025    Pneumonia of right lung due to infectious organism [J18.9] 06/15/2025                           Events of last night                      Continues to be pressor dependent                      Subjective   2025               Continues to be Airvo dependent                      Objective  2025               Vitals:    25 0900   BP: (!) 117/46   Pulse: 92   Resp: 19   Temp:    SpO2: 98%     Temperature range : Temp  Av.7 °F (37.1 
       [facility] Hospitalist Progress Note    Admitting Date and Time: 6/15/2025  1:19 AM  Admit Dx: Pleural effusion on right [J90]  Septic shock (HCC) [A41.9, R65.21]  Pneumonia of right lung due to infectious organism, unspecified part of lung [J18.9]  Sepsis with acute hypoxic respiratory failure, due to unspecified organism, unspecified whether septic shock present (HCC) [A41.9, R65.20, J96.01]  Acute hypoxic respiratory failure (HCC) [J96.01]    Synopsis: Patient is a 69 year old male with hx of adenocacinoma of the lung chronic respiratory failure, previously trache dependent but weaned to nasal cannula. He came from a SNF for shortness of breath and hypoxia, was found to have a right lower lobe pneumonia, right pleural effusion and in septic shock.  Patient was started on pressors, broad-spectrum antibiotics.  Infectious diseases following.  He also has H3 DARREN for which nephrology is following patient admitted to the MICU.  Pt was intubated on 6/17 given respiratory failure  6/18-pt developed a fib; back to nsr ; EP consulted; on  amiodarone drip; levophed switched to aarti  6/19-pressor weaned off; still on amio drip for a fib rvr  6/20-off sedation; pt awake; undergoing sbt  6/21-pt awake and interactive ; amio drip transistioned to po; still heart rate is in 110s; echo pending    Subjective:  Patient is being followed for Pleural effusion on right [J90]  Septic shock (HCC) [A41.9, R65.21]  Pneumonia of right lung due to infectious organism, unspecified part of lung [J18.9]  Sepsis with acute hypoxic respiratory failure, due to unspecified organism, unspecified whether septic shock present (HCC) [A41.9, R65.20, J96.01]  Acute hypoxic respiratory failure (HCC) [J96.01]   Seen and examined at bedside      ROS: could not be obtained     amiodarone  200 mg Oral BID    albuterol  2.5 mg Nebulization Q6H    furosemide  40 mg IntraVENous Q8H    levothyroxine  25 mcg Oral Daily    sulfamethoxazole-trimethoprim 
       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 He came from a SNF for shortness of breath and hypoxia, was found to have a right lower lobe pneumonia, right pleural effusion and in septic shock.  Patient was started on pressors, broad-spectrum antibiotics.  Infectious diseases following.  He also has H3 DARREN for which nephrology is following patient admitted to the MICU.  Pt was intubated on 6/17 given respiratory failure  6/18-pt developed a fib; back to nsr ; EP consulted; on  amiodarone drip; levophed switched to aarti    Subjective:  Patient is being followed for Pleural effusion on right [J90]  Septic shock (HCC) [A41.9, R65.21]  Pneumonia of right lung due to infectious organism, unspecified part of lung [J18.9]  Sepsis with acute hypoxic respiratory failure, due to unspecified organism, unspecified whether septic shock present (HCC) [A41.9, R65.20, J96.01]  Acute hypoxic respiratory failure (HCC) [J96.01]   Seen and examined at bedside      ROS: could not be obtained     chlorhexidine  15 mL Mouth/Throat BID    acetylcysteine  600 mg Inhalation Q4H    pantoprazole (PROTONIX) 40 mg in sodium chloride (PF) 0.9 % 10 mL injection  40 mg IntraVENous Daily    piperacillin-tazobactam  4,500 mg IntraVENous Q12H    sodium chloride flush  5-40 mL IntraVENous 2 times per day    heparin (porcine)  5,000 Units SubCUTAneous 3 times per day    hydrocortisone 
       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 He came from a SNF for shortness of breath and hypoxia, was found to have a right lower lobe pneumonia, right pleural effusion and in septic shock.  Patient was started on pressors, broad-spectrum antibiotics.  Infectious diseases following.  He also has H3 DARREN for which nephrology is following patient admitted to the MICU.  Pt was intubated on 6/17 given respiratory failure  6/18-pt developed a fib; back to nsr ; EP consulted; on  amiodarone drip; levophed switched to aarti  6/19-pressor weaned off; still on amio drip for a fib rvr  6/20-off sedation; pt awake; undergoing sbt  6/21-pt awake and interactive ; amio drip transistioned to po; still heart rate is in 110s; echo pending  6/22- leukocytosis -uptrending 26 K today; us abdomen per ID  Subjective:  Patient is being followed for Pleural effusion on right [J90]  Septic shock (HCC) [A41.9, R65.21]  Pneumonia of right lung due to infectious organism, unspecified part of lung [J18.9]  Sepsis with acute hypoxic respiratory failure, due to unspecified organism, unspecified whether septic shock present (HCC) [A41.9, R65.20, J96.01]  Acute hypoxic respiratory failure (HCC) [J96.01]   Seen and examined at bedside      ROS: could not be obtained     sodium chloride flush  5-40 mL IntraVENous 2 times per day    lidocaine 1 % injection  50 mg IntraDERmal Once    
    Pulmonary Progress Note    Admit Date: 6/15/2025                            PCP: No primary care provider on file.  Principal Problem:    Septic shock (HCC)  Active Problems:    Pneumonia of right lung due to infectious organism    Paroxysmal atrial fibrillation (HCC)    Pleural effusion on right    Severe protein-calorie malnutrition    Palliative care encounter  Resolved Problems:    * No resolved hospital problems. *      Subjective:    Now sleepy.  Did 3 hours of pressure support of 8    Medications:   amiodarone 0.5 mg/min (25 0700)    sodium chloride          levothyroxine  25 mcg Oral Daily    sulfamethoxazole-trimethoprim (BACTRIM) 379.2 mg in dextrose 5 % 500 mL IVPB  5 mg/kg IntraVENous Q12H    chlorhexidine  15 mL Mouth/Throat BID    acetylcysteine  600 mg Inhalation Q4H    pantoprazole (PROTONIX) 40 mg in sodium chloride (PF) 0.9 % 10 mL injection  40 mg IntraVENous Daily    sodium chloride flush  5-40 mL IntraVENous 2 times per day    heparin (porcine)  5,000 Units SubCUTAneous 3 times per day    arformoterol tartrate  15 mcg Nebulization BID RT    ipratropium 0.5 mg-albuterol 2.5 mg  1 Dose Inhalation 4x Daily RT    budesonide  250 mcg Nebulization BID    white petrolatum   Topical BID       Vitals:  VITALS:  /80   Pulse (!) 113   Temp 97 °F (36.1 °C) (Axillary)   Resp 22   Ht 1.778 m (5' 10\")   Wt 75.8 kg (167 lb)   SpO2 95%   BMI 23.96 kg/m²   24HR INTAKE/OUTPUT:    Intake/Output Summary (Last 24 hours) at 2025 1518  Last data filed at 2025 1345  Gross per 24 hour   Intake 2132.96 ml   Output 2695 ml   Net -562.04 ml     CURRENT PULSE OXIMETRY:  SpO2: 95 %  24HR PULSE OXIMETRY RANGE:  SpO2  Av.9 %  Min: 90 %  Max: 100 %  CVP:    VENT SETTINGS:   Vent Information  Ventilator Day(s): 4  Ventilator ID: [id number]  Equipment Changed: (S) Expiratory Filter  Ventilator Initiate: (S) Yes  Vent Mode: (S) AC/PRVC  Additional Respiratory Assessments  Pulse: (!) 
    Pulmonary Progress Note    Admit Date: 6/15/2025                            PCP: No primary care provider on file.  Principal Problem:    Septic shock (HCC)  Active Problems:    Pneumonia of right lung due to infectious organism    Paroxysmal atrial fibrillation (HCC)    Pleural effusion on right    Severe protein-calorie malnutrition    Palliative care encounter  Resolved Problems:    * No resolved hospital problems. *      Subjective:  Anxious dyspneic on full support.  Now on low dose Neosynephrine    Medications:   phenylephrine (KEI-SYNEPHRINE) 50 mg in sodium chloride 0.9 % 250 mL infusion 25 mcg/min (25 0344)    sodium chloride      amiodarone 0.5 mg/min (25 0536)        acetylcysteine  600 mg Inhalation Q6H RT    sodium chloride flush  5-40 mL IntraVENous 2 times per day    lidocaine 1 % injection  50 mg IntraDERmal Once    amiodarone  200 mg Oral BID    albuterol  2.5 mg Nebulization Q6H    levothyroxine  25 mcg Oral Daily    sulfamethoxazole-trimethoprim (BACTRIM) 379.2 mg in dextrose 5 % 500 mL IVPB  5 mg/kg IntraVENous Q12H    chlorhexidine  15 mL Mouth/Throat BID    pantoprazole (PROTONIX) 40 mg in sodium chloride (PF) 0.9 % 10 mL injection  40 mg IntraVENous Daily    heparin (porcine)  5,000 Units SubCUTAneous 3 times per day    arformoterol tartrate  15 mcg Nebulization BID RT    budesonide  250 mcg Nebulization BID    white petrolatum   Topical BID       Vitals:  VITALS:  /62   Pulse 92   Temp 98.2 °F (36.8 °C) (Oral)   Resp (!) 35   Ht 1.778 m (5' 10\")   Wt 75.8 kg (167 lb)   SpO2 99%   BMI 23.96 kg/m²   24HR INTAKE/OUTPUT:    Intake/Output Summary (Last 24 hours) at 2025 1413  Last data filed at 2025 1400  Gross per 24 hour   Intake 1281.87 ml   Output 2350 ml   Net -1068.13 ml     CURRENT PULSE OXIMETRY:  SpO2: 99 %  24HR PULSE OXIMETRY RANGE:  SpO2  Av.3 %  Min: 89 %  Max: 100 %  CVP:    VENT SETTINGS:   Vent Information  Ventilator Day(s): 
    Pulmonary Progress Note    Admit Date: 6/15/2025                            PCP: No primary care provider on file.  Principal Problem:    Septic shock (HCC)  Active Problems:    Pneumonia of right lung due to infectious organism    Paroxysmal atrial fibrillation (HCC)    Pleural effusion on right    Severe protein-calorie malnutrition    Palliative care encounter  Resolved Problems:    * No resolved hospital problems. *      Subjective:  Off Kei and on full support. Denies dyspnea but does have abdominal pain.    Medications:   phenylephrine (KEI-SYNEPHRINE) 50 mg in sodium chloride 0.9 % 250 mL infusion Stopped (25)    sodium chloride          potassium & sodium phosphates  1 packet Per NG tube BID    furosemide  40 mg IntraVENous Q8H    sodium chloride flush  5-40 mL IntraVENous 2 times per day    lidocaine 1 % injection  50 mg IntraDERmal Once    amiodarone  200 mg Oral BID    albuterol  2.5 mg Nebulization Q6H    levothyroxine  25 mcg Oral Daily    sulfamethoxazole-trimethoprim (BACTRIM) 379.2 mg in dextrose 5 % 500 mL IVPB  5 mg/kg IntraVENous Q12H    chlorhexidine  15 mL Mouth/Throat BID    pantoprazole (PROTONIX) 40 mg in sodium chloride (PF) 0.9 % 10 mL injection  40 mg IntraVENous Daily    heparin (porcine)  5,000 Units SubCUTAneous 3 times per day    arformoterol tartrate  15 mcg Nebulization BID RT    budesonide  250 mcg Nebulization BID    white petrolatum   Topical BID       Vitals:  VITALS:  /65   Pulse 87   Temp 97.8 °F (36.6 °C) (Oral)   Resp 18   Ht 1.778 m (5' 10\")   Wt 75.8 kg (167 lb)   SpO2 96%   BMI 23.96 kg/m²   24HR INTAKE/OUTPUT:    Intake/Output Summary (Last 24 hours) at 2025 1419  Last data filed at 2025 1322  Gross per 24 hour   Intake 4650.99 ml   Output 2300 ml   Net 2350.99 ml     CURRENT PULSE OXIMETRY:  SpO2: 96 %  24HR PULSE OXIMETRY RANGE:  SpO2  Av.1 %  Min: 92 %  Max: 100 %  CVP:    VENT SETTINGS:   Vent Information  Ventilator Day(s): 
    Pulmonary Progress Note    Admit Date: 6/15/2025                            PCP: No primary care provider on file.  Principal Problem:    Septic shock (HCC)  Active Problems:    Pneumonia of right lung due to infectious organism  Resolved Problems:    * No resolved hospital problems. *      Subjective:  Events from overnight noted.  Patient now off of pressors and sedation.  Awake alert.  Wife and son at bedside.    Medications:   phenylephrine (KEI-SYNEPHRINE) 50 mg in sodium chloride 0.9 % 250 mL infusion Stopped (06/18/25 1223)    amiodarone 0.5 mg/min (06/18/25 0913)    VASOpressin 20 Units in sodium chloride 0.9 % 100 mL IVPB ([id number]) Stopped (06/18/25 1130)    lactated ringers 125 mL/hr at 06/18/25 1308    midazolam 3 mg/hr (06/18/25 1341)    fentaNYL 50 mcg/hr (06/18/25 0815)    sodium chloride          chlorhexidine  15 mL Mouth/Throat BID    acetylcysteine  600 mg Inhalation Q4H    pantoprazole (PROTONIX) 40 mg in sodium chloride (PF) 0.9 % 10 mL injection  40 mg IntraVENous Daily    piperacillin-tazobactam  4,500 mg IntraVENous Q12H    sodium chloride flush  5-40 mL IntraVENous 2 times per day    heparin (porcine)  5,000 Units SubCUTAneous 3 times per day    hydrocortisone sodium succinate PF (SOLU-CORTEF) 100 mg in sterile water 2 mL injection  100 mg IntraVENous Q8H    arformoterol tartrate  15 mcg Nebulization BID RT    ipratropium 0.5 mg-albuterol 2.5 mg  1 Dose Inhalation 4x Daily RT    budesonide  250 mcg Nebulization BID    white petrolatum   Topical BID       Vitals:  VITALS:  BP (!) 132/58   Pulse (!) 105   Temp 98.2 °F (36.8 °C) (Bladder)   Resp 16   Ht 1.778 m (5' 10\")   Wt 76.2 kg (168 lb)   SpO2 94%   BMI 24.11 kg/m²   24HR INTAKE/OUTPUT:    Intake/Output Summary (Last 24 hours) at 6/18/2025 1438  Last data filed at 6/18/2025 1400  Gross per 24 hour   Intake 3553.17 ml   Output 1710 ml   Net 1843.17 ml     CURRENT PULSE OXIMETRY:  SpO2: 94 %  24HR PULSE OXIMETRY RANGE:  SpO2  Avg: 
    Pulmonary Progress Note    Admit Date: 6/15/2025                            PCP: No primary care provider on file.  Principal Problem:    Septic shock (HCC)  Active Problems:    Pneumonia of right lung due to infectious organism  Resolved Problems:    * No resolved hospital problems. *      Subjective:  Now hooked up to the ventilator sedated on Versed and fentanyl      Medications:   midazolam 2 mg/hr (25 1112)    fentaNYL 50 mcg/hr (25 1114)    norepinephrine 2 mcg/min (25 1102)    sodium chloride      sodium chloride 100 mL/hr at 25 1345        acetylcysteine  600 mg Inhalation Q4H    pantoprazole (PROTONIX) 40 mg in sodium chloride (PF) 0.9 % 10 mL injection  40 mg IntraVENous Daily    piperacillin-tazobactam  4,500 mg IntraVENous Q12H    sodium chloride flush  5-40 mL IntraVENous 2 times per day    heparin (porcine)  5,000 Units SubCUTAneous 3 times per day    hydrocortisone sodium succinate PF (SOLU-CORTEF) 100 mg in sterile water 2 mL injection  100 mg IntraVENous Q8H    arformoterol tartrate  15 mcg Nebulization BID RT    ipratropium 0.5 mg-albuterol 2.5 mg  1 Dose Inhalation 4x Daily RT    budesonide  250 mcg Nebulization BID    white petrolatum   Topical BID       Vitals:  VITALS:  BP (!) 119/55   Pulse 83   Temp 98.4 °F (36.9 °C) (Bladder)   Resp 16   Ht 1.778 m (5' 10\")   Wt 76.2 kg (168 lb)   SpO2 98%   BMI 24.11 kg/m²   24HR INTAKE/OUTPUT:    Intake/Output Summary (Last 24 hours) at 2025 1521  Last data filed at 2025 1200  Gross per 24 hour   Intake 5945.16 ml   Output 2490 ml   Net 3455.16 ml     CURRENT PULSE OXIMETRY:  SpO2: 98 %  24HR PULSE OXIMETRY RANGE:  SpO2  Av.9 %  Min: 95 %  Max: 100 %  CVP:    VENT SETTINGS:   Vent Information  Ventilator Day(s): 1  Ventilator ID: [id number]  Equipment Changed: (S) Expiratory Filter  Ventilator Initiate: (S) Yes  Vent Mode: AC/VC  Additional Respiratory Assessments  Pulse: 83  Respirations: 16  SpO2: 98 
    Pulmonary Progress Note    Admit Date: 6/15/2025                            PCP: No primary care provider on file.  Principal Problem:    Septic shock (HCC)  Resolved Problems:    * No resolved hospital problems. *      Subjective:    Patient known to [facility]  Discharged to SNF ([facility]) on 6/10 from [facility]  Admitted at [facility] from  - 6/10/2025  Discharged on 5 L, trach capped on .    Patient was unable to be decannulated as he was unable to handle secretions    6/15: Presented to [facility] ED from nursing home with respiratory distress.  Shortness of breath, 75% on 10 L high flow nasal cannula.  EMS started CPAP while in route  In ED, sodium 129, potassium 5.8, chloride 88, BUN/creatinine 98/5.6, troponin 616, 540, proBNP 1389  WBC 27.6, hemoglobin 11.5, respiratory panel negative  Initial AB.34/48.6/69.7/25.9 on CPAP 6  Chest x-ray with worsening of right basilar opacities and increased right pleural effusion  CT abdomen pelvis: 2.1 cm linear metallic density within gastric lumen, small right pleural effusion with adjacent right lower lobe consolidation, punctuate hyperdensity within dependent bladder possibly bladder calculus  EKG with new right bundle branch block  Given vancomycin, Zosyn, hyperkalemia protocol  Hypotension s/p fluid bolus, Levophed started    Patient seen on ED cart on heated high flow nasal cannula 60 L/100% FiO2 with pulse oximetry 89 to 90%  Patient reports shortness of breath.  Reports his Pleurx catheter has been drained daily.  Last drained yesterday afternoon.  He reports increased cough and mucus production over the past few days.  Denies wheezing  He denies fever at the nursing home.  Denies chills.  Reports intermittent abdominal pain.  Reports he has been urinating.  Spoke with his daughter at the bedside  Spoke with RN at the bedside.  Patient previously on BiPAP but requested a break.  Just recently placed on Airvo    Medications:   norepinephrine 25 
   06/15/25 0738   NIV Type   NIV Started/Stopped On   Equipment Type v60   Mode Bilevel   Mask Type Full face mask   Mask Size Medium   Assessment   Pulse (!) 102   Respirations 17   /64   SpO2 95 %   Level of Consciousness 0   Comfort Level Good   Using Accessory Muscles No   Mask Compliance Good   Skin Assessment Clean, dry, & intact   Skin Protection for O2 Device Yes   Settings/Measurements   PIP Observed 12 cm H20   IPAP 12 cmH20   CPAP/EPAP 6 cmH2O   Vt (Measured) 620 mL   Rate Ordered 14   Insp Rise Time (%) 3 %   FiO2  90 %   I Time/ I Time % 0.9 s   Minute Volume (L/min) 10.5 Liters   Mask Leak (lpm) 60 lpm   Patient's Home Machine No   Alarm Settings   Alarms On Y   Low Pressure (cmH2O) 8 cmH2O   High Pressure (cmH2O) 30 cmH2O   Apnea (secs) 20 secs   RR Low (bpm) 14   RR High (bpm) 40 br/min       
   06/15/25 1105   Oxygen Therapy/Pulse Ox   O2 Therapy Oxygen humidified   O2 Device Heated high flow cannula   O2 Flow Rate (L/min) 60 L/min   FiO2  100 %   Pulse (!) 105   Respirations 14   SpO2 90 %   Pulse Oximeter Device Mode Continuous   Pulse Oximeter Device Location Finger       
   06/23/25 1231   Patient Observation   Pulse 96   Respirations 17   SpO2 100 %   Vent Information   Vent Mode AC/VC   Ventilator Settings   Vt (Set, mL) 450 mL   Resp Rate (Set) 16 bpm   PEEP/CPAP (cmH2O) 8   FiO2  45 %   Vent Patient Data (Readings)   Vt (Measured) 663 mL   Peak Inspiratory Pressure (cmH2O) 37 cmH2O   Rate Measured 19 br/min   Minute Volume (L/min) 7.62 Liters   Mean Airway Pressure (cmH2O) 14 cmH20   Plateau Pressure (cm H2O) 21 cm H2O   Driving Pressure 13   I:E Ratio 1:4.40   Flow Sensitivity 3 L/min   Backup Apnea On   Backup Rate 16 Breaths Per Minute   Backup Vt 450   Backup I Time 20   Vent Alarm Settings   High Pressure (cmH2O) 50 cmH2O   Low Minute Volume (lpm) 4 L/min   High Minute Volume (lpm) 15 L/min   Low Exhaled Vt (ml) 350 mL   High Exhaled Vt (ml) 700 mL   RR High (bpm) 30 br/min     Patient was tolerating PSV mode, placed back on ac mode per patient request, patient was stating he was tired of fighting for a breath.   
   06/24/25 0801   Patient Observation   Pulse 95   Respirations 17   SpO2 93 %   Breath Sounds   Respiratory Pattern Regular   Vent Information   Vent Mode (S)  CPAP/PS   Vent Patient Data (Readings)   Vt Spont (mL) (S)  559 mL   Mean Airway Pressure (cmH2O) 11 cmH20   I:E Ratio 1:3.2   Flow Sensitivity 3 L/min   Backup Apnea On   Backup Rate 16 Breaths Per Minute   Backup Vt 450   Backup I Time 20     Respiratory Wean Start time:  Order to wean ventilator Yes (if no, contact provider)    Patient placed on PS of 8, PEEP 8cm H2O, FIO2 45 at 0801AM.    HR 95 bpm  RR 17 bpm  SPO2 93 %      Will continue to attempt to wean based on patient tolerance.       Performed by  Vaughn Hernandez RCP RRT  
  [facility]  [facility]  Progress Note    SUBJECTIVE:  Chief Complaint   Patient presents with    Respiratory Distress     From NH increased sob 75% on 10L HF at facility arrived on c-pap. Current ATB use. Capped trach    CC: pneumonia, respiratory failure    Patient resting in bed. Denies fever or chills.  No nausea, vomiting, rash or diarrhea. Feeling better today.       Review of systems:  As stated above in the chief complaint, otherwise negative.    Medications:  Scheduled Meds:   potassium & sodium phosphates  1 packet Per NG tube BID    sodium chloride flush  5-40 mL IntraVENous 2 times per day    lidocaine 1 % injection  50 mg IntraDERmal Once    amiodarone  200 mg Oral BID    albuterol  2.5 mg Nebulization Q6H    levothyroxine  25 mcg Oral Daily    sulfamethoxazole-trimethoprim (BACTRIM) 379.2 mg in dextrose 5 % 500 mL IVPB  5 mg/kg IntraVENous Q12H    chlorhexidine  15 mL Mouth/Throat BID    pantoprazole (PROTONIX) 40 mg in sodium chloride (PF) 0.9 % 10 mL injection  40 mg IntraVENous Daily    heparin (porcine)  5,000 Units SubCUTAneous 3 times per day    arformoterol tartrate  15 mcg Nebulization BID RT    budesonide  250 mcg Nebulization BID    white petrolatum   Topical BID     Continuous Infusions:   phenylephrine (KEI-SYNEPHRINE) 50 mg in sodium chloride 0.9 % 250 mL infusion Stopped (25 0007)    sodium chloride      amiodarone 0.5 mg/min (25 0508)     PRN Meds:sodium chloride flush, sodium chloride, ipratropium 0.5 mg-albuterol 2.5 mg, fentanNYL, midazolam, sodium chloride, ondansetron **OR** ondansetron, polyethylene glycol, acetaminophen **OR** acetaminophen    OBJECTIVE:  /65   Pulse 97   Temp 98.3 °F (36.8 °C) (Oral)   Resp 18   Ht 1.778 m (5' 10\")   Wt 75.8 kg (167 lb)   SpO2 92%   BMI 23.96 kg/m²   Temp  Av.2 °F (36.8 °C)  Min: 98 °F (36.7 °C)  Max: 98.3 °F (36.8 °C)      Constitutional: NAD.  Skin: Warm and dry. No rashes were noted. 
  Palliative Care Department  [phone]  Palliative Care Progress Note  Provider GREGORIA Orta CNP     Frank Niño  [id number]  Hospital Day: 6  Date of Initial Consult: 6/18/2025  Referring Provider: Karen Alanis APRN - CNP  Palliative Medicine was consulted for assistance with: Goals of care    HPI:   Frank Niño is a 69 y.o. with a medical history of COPD,  who was admitted on 6/15/2025 from nursing facility with a CHIEF COMPLAINT of respiratory distress.  Patient was as at [facility] for an extended period of time [facility], at [facility] he was weaned to a capped trach and was utilizing nasal cannula however was transferred to the ICU from [facility] yesterday due to increasing respiratory distress requiring 10 L high flow at the facility.  Upon arrival to the emergency room he was noted to be tachycardic and hypotensive.  Started on vasopressors.  White blood cell count is significantly elevated at 27. He does have a history of right lower lobe non-small cell lung cancer he has undergone radiation both to the mass in the right lower lobe and then subsequently underwent gamma knife stereotactic radiosurgery on April 30, 2025 to a lesion in the right frontal lobe concerning for metastatic disease. The patient was transferred to the ICU for further medical management.     ASSESSMENT/PLAN:     Pertinent Hospital Diagnoses     Septic shock  Acute on chronic hypoxic respiratory failure  Pneumonia  Hyponatremia  Small cell lung cancer  Acute on chronic anemia      Palliative Care Encounter / Counseling Regarding Goals of Care  Please see detailed goals of care discussion as below  At this time, Frank iNño, Does Not have capacity for medical decision-making.  Capacity is time limited and situation/question specific  During encounter Kathi was surrogate medical decision-maker  Outcome of goals of care meeting:   Continue current medical 
  [facility] Quality Flow/Interdisciplinary Rounds Progress Note        Quality Flow Rounds held on Debbie 15, 2025    Disciplines Attending:  Bedside Nurse, , , Nursing Unit Leadership, and Respiratory Therapy    Frank Niño was admitted on 6/15/2025  1:19 AM    Anticipated Discharge Date:       Disposition: ICU    Freeman Score:  Freeman Scale Score: 12    [facility] RISK OF UNPLANNED READMISSION 2.0             14.4 Total Score        Discussed patient goal for the day, patient clinical progression, and barriers to discharge.  The following Goal(s) of the Day/Commitment(s) have been identified:  Insert arterial line for blood pressure management. Hyperkalemia protocol. Continue ICU management.       Tanisha See RN  Debbie 15, 2025      
  [facility]  [facility]  Progress Note    SUBJECTIVE:  Chief Complaint   Patient presents with    Respiratory Distress     From NH increased sob 75% on 10L HF at facility arrived on c-pap. Current ATB use. Capped trach    CC: pneumonia, respiratory failure    Patient resting in bed. Denies fever or chills. States he's more tired today. Having trouble breathing. No nausea, vomiting, rash or diarrhea      Review of systems:  As stated above in the chief complaint, otherwise negative.    Medications:  Scheduled Meds:   acetylcysteine  600 mg Inhalation Q6H RT    potassium phosphate IVPB (PERIPHERAL LINE)  15 mmol IntraVENous Once    sodium chloride flush  5-40 mL IntraVENous 2 times per day    lidocaine 1 % injection  50 mg IntraDERmal Once    amiodarone  200 mg Oral BID    albuterol  2.5 mg Nebulization Q6H    levothyroxine  25 mcg Oral Daily    sulfamethoxazole-trimethoprim (BACTRIM) 379.2 mg in dextrose 5 % 500 mL IVPB  5 mg/kg IntraVENous Q12H    chlorhexidine  15 mL Mouth/Throat BID    pantoprazole (PROTONIX) 40 mg in sodium chloride (PF) 0.9 % 10 mL injection  40 mg IntraVENous Daily    heparin (porcine)  5,000 Units SubCUTAneous 3 times per day    arformoterol tartrate  15 mcg Nebulization BID RT    budesonide  250 mcg Nebulization BID    white petrolatum   Topical BID     Continuous Infusions:   phenylephrine (KEI-SYNEPHRINE) 50 mg in sodium chloride 0.9 % 250 mL infusion 25 mcg/min (25 0344)    sodium chloride      amiodarone 0.5 mg/min (25 0536)     PRN Meds:sodium chloride flush, sodium chloride, ipratropium 0.5 mg-albuterol 2.5 mg, fentanNYL, midazolam, sodium chloride, ondansetron **OR** ondansetron, polyethylene glycol, acetaminophen **OR** acetaminophen    OBJECTIVE:  BP (!) 97/54   Pulse 90   Temp 98.3 °F (36.8 °C) (Oral)   Resp 19   Ht 1.778 m (5' 10\")   Wt 75.8 kg (167 lb)   SpO2 95%   BMI 23.96 kg/m²   Temp  Av.4 °F (36.9 °C)  Min: 98.2 °F (36.8 
4 Eyes Skin Assessment     NAME:  Frank Niño  YOB: 1955  MEDICAL RECORD NUMBER:  [id number]    The patient is being assessed for  Admission    I agree that at least one RN has performed a thorough Head to Toe Skin Assessment on the patient. ALL assessment sites listed below have been assessed.      Areas assessed by both nurses:    Head, Face, Ears, Shoulders, Back, Chest, Arms, Elbows, Hands, Sacrum. Buttock, Coccyx, Ischium, Legs. Feet and Heels, and Under Medical Devices         Does the Patient have a Wound? Yes wound(s) were present on assessment. LDA wound assessment was Initiated and completed by RN    Nonblanchable redness to coccyx 10 X 10   Blanchable redness to checks and nose from bipap - off now on airvo  Bilateral heels and elbows dusky but blanchable       Freeman Prevention initiated by RN: Yes  Wound Care Orders initiated by RN: No    Pressure Injury (Stage 3,4, Unstageable, DTI, NWPT, and Complex wounds) if present, place Wound referral order by RN under : No    New Ostomies, if present place, Ostomy referral order under : No     Nurse 1 eSignature: Electronically signed by Tanisha See RN on 6/15/25 at 5:13 PM EDT    **SHARE this note so that the co-signing nurse can place an eSignature**    Nurse 2 eSignature: Electronically signed by Nena Amaro on 6/15/25 at 5:14 PM EDT  
Antibiotic Extended Infusion Policy     This patient is on medication that requires renal, weight, and/or indication dose adjustment.      Date Body Weight IBW  Adjusted BW SCr  CrCl Dialysis status BMI   6/15/2025 76.2 kg (168 lb) Ideal body weight: 73 kg (160 lb 15 oz)  Adjusted ideal body weight: 74.3 kg (163 lb 12.2 oz) Serum creatinine: 5.6 mg/dL (H) 06/15/25 0230  Estimated creatinine clearance: 13 mL/min (A) N/a Body mass index is 24.11 kg/m².       Pharmacy has dose-adjusted the following medication(s):    Ordered Medication: Zosyn 3375mg q8H     Order Changed/converted to: Zosyn 4500mg q12h    These changes were made per protocol according to the [facility]   Automatic Extended Infusion Dose Adjustment Policy.     *Please note this dose may need readjusted if patient's condition changes.    Please contact pharmacy with any questions regarding these changes.    Tahir Buckley RPH  6/15/2025  5:29 AM    
[facility]  MD Christian Parrish MD Lisa Kniska, CNP   Johnna Montero, EMORY Melvin, CNP  Progress Note    6/21/2025    SUBJECTIVE:   6/16: Seen while in the ICU. Eyes closed, seems to be resting comfortably. He does awaken to voice. He does appear to be mildly dyspneic on exam, though denies any complaints of worsening shortness of breath. He denies any diarrhea, nausea, or vomiting. Pleurix cath drained yesterday for 650 cc. Urine output picked up last evening, he is nonoliguric. He continues on levophed at 6.6 mcg.     6/17: Remains critically ill, in the ICU. Trach exchanged to cuffed, now on the ventilator. He is sedated. He remains on levophed. Blood pressure dropped slightly after receiving sedation. Urine output is excellent. Aggressive pulmonary hygiene started. CXR to be repeated, may need a bronch pending those results.     6/18: Awake, alert, interactive and appropriate.  On ventilator, vent settings stable.  Breathing comfortably.  BP stable.    6/19: Hemodynamically stable.  On ventilator via tracheostomy.  Denies new complaint.  Does have bilateral upper extremity edema, mild     6/20: Seen while laying in bed, in the ICU. Awake and alert. Mechanically ventilated via trach. Undergoing SBT earlier this morning. He does admit to some dyspnea and feels he needs suctioned. He is awaiting CorePak placement. No lower extremity edema noted. On amiodarone.     6/21: Remains critically ill, in the ICU.  On ventilator via tracheostomy tube.  Vent settings stable.  Breathing comfortably on vent.  Awake alert interactive.  Denies complaint.  Swelling a little bit worse.  Leukocytosis, Bactrim added    PROBLEM LIST:    Principal Problem:    Septic shock (HCC)  Active Problems:    Pneumonia of right lung due to infectious organism    Paroxysmal atrial fibrillation (HCC)    Pleural effusion on right    Severe protein-calorie malnutrition    Palliative care 
[facility]  MD Christian Parrish MD Lisa Kniska, CNP   Johnna Montero, EMORY Melvin, HANK  Progress Note    6/16/2025    SUBJECTIVE:   6/16: Seen while in the ICU. Eyes closed, seems to be resting comfortably. He does awaken to voice. He does appear to be mildly dyspneic on exam, though denies any complaints of worsening shortness of breath. He denies any diarrhea, nausea, or vomiting. Pleurix cath drained yesterday for 650 cc. Urine output picked up last evening, he is nonoliguric. He continues on levophed at 6.6 mcg.     PROBLEM LIST:    Principal Problem:    Septic shock (HCC)  Active Problems:    Pneumonia of right lung due to infectious organism  Resolved Problems:    * No resolved hospital problems. *         DIET:    Diet NPO     MEDS (scheduled):    piperacillin-tazobactam  4,500 mg IntraVENous Q12H    sodium chloride flush  5-40 mL IntraVENous 2 times per day    heparin (porcine)  5,000 Units SubCUTAneous 3 times per day    hydrocortisone sodium succinate PF (SOLU-CORTEF) 100 mg in sterile water 2 mL injection  100 mg IntraVENous Q8H    arformoterol tartrate  15 mcg Nebulization BID RT    ipratropium 0.5 mg-albuterol 2.5 mg  1 Dose Inhalation 4x Daily RT    budesonide  250 mcg Nebulization BID    albumin human 25%  25 g IntraVENous Q6H    white petrolatum   Topical BID       MEDS (infusions):   norepinephrine 7 mcg/min (06/16/25 1108)    sodium chloride      sodium chloride 150 mL/hr at 06/16/25 0950       MEDS (prn):  sodium chloride, sodium chloride flush, sodium chloride, ondansetron **OR** ondansetron, polyethylene glycol, acetaminophen **OR** acetaminophen    PHYSICAL EXAM:     Patient Vitals for the past 24 hrs:   BP Temp Temp src Pulse Resp SpO2 Height   06/16/25 0900 (!) 117/46 -- -- 92 19 98 % --   06/16/25 0848 -- -- -- 91 16 99 % --   06/16/25 0800 (!) 111/46 98.6 °F (37 °C) Bladder 100 16 100 % --   06/16/25 0700 (!) 119/51 -- -- (!) 108 15 90 % -- 
[facility]  MD Christian Parrish MD Lisa Kniska, CNP   Johnna Montero, EMORY Melvin, HANK  Progress Note    6/17/2025    SUBJECTIVE:   6/16: Seen while in the ICU. Eyes closed, seems to be resting comfortably. He does awaken to voice. He does appear to be mildly dyspneic on exam, though denies any complaints of worsening shortness of breath. He denies any diarrhea, nausea, or vomiting. Pleurix cath drained yesterday for 650 cc. Urine output picked up last evening, he is nonoliguric. He continues on levophed at 6.6 mcg.     6/17: Remains critically ill, in the ICU. Trach exchanged to cuffed, now on the ventilator. He is sedated. He remains on levophed. Blood pressure dropped slightly after receiving sedation. Urine output is excellent. Aggressive pulmonary hygiene started. CXR to be repeated, may need a bronch pending those results.      PROBLEM LIST:    Principal Problem:    Septic shock (HCC)  Active Problems:    Pneumonia of right lung due to infectious organism  Resolved Problems:    * No resolved hospital problems. *         DIET:    Diet NPO     MEDS (scheduled):    acetylcysteine  600 mg Inhalation Q4H    pantoprazole (PROTONIX) 40 mg in sodium chloride (PF) 0.9 % 10 mL injection  40 mg IntraVENous Daily    piperacillin-tazobactam  4,500 mg IntraVENous Q12H    sodium chloride flush  5-40 mL IntraVENous 2 times per day    heparin (porcine)  5,000 Units SubCUTAneous 3 times per day    hydrocortisone sodium succinate PF (SOLU-CORTEF) 100 mg in sterile water 2 mL injection  100 mg IntraVENous Q8H    arformoterol tartrate  15 mcg Nebulization BID RT    ipratropium 0.5 mg-albuterol 2.5 mg  1 Dose Inhalation 4x Daily RT    budesonide  250 mcg Nebulization BID    white petrolatum   Topical BID       MEDS (infusions):   midazolam 2 mg/hr (06/17/25 1112)    fentaNYL 50 mcg/hr (06/17/25 1114)    norepinephrine 2 mcg/min (06/17/25 1102)    sodium chloride      sodium chloride 150 
[facility]  MD Christian Parrish MD Lisa Kniska, CNP   Johnna Montero, EMORY Melvin, HANK  Progress Note    6/20/2025    SUBJECTIVE:   6/16: Seen while in the ICU. Eyes closed, seems to be resting comfortably. He does awaken to voice. He does appear to be mildly dyspneic on exam, though denies any complaints of worsening shortness of breath. He denies any diarrhea, nausea, or vomiting. Pleurix cath drained yesterday for 650 cc. Urine output picked up last evening, he is nonoliguric. He continues on levophed at 6.6 mcg.     6/17: Remains critically ill, in the ICU. Trach exchanged to cuffed, now on the ventilator. He is sedated. He remains on levophed. Blood pressure dropped slightly after receiving sedation. Urine output is excellent. Aggressive pulmonary hygiene started. CXR to be repeated, may need a bronch pending those results.     6/18: Awake, alert, interactive and appropriate.  On ventilator, vent settings stable.  Breathing comfortably.  BP stable.    6/19: Hemodynamically stable.  On ventilator via tracheostomy.  Denies new complaint.  Does have bilateral upper extremity edema, mild     6/20: Seen while laying in bed, in the ICU. Awake and alert. Mechanically ventilated via trach. Undergoing SBT earlier this morning. He does admit to some dyspnea and feels he needs suctioned. He is awaiting CorePak placement. No lower extremity edema noted. On amiodarone.     PROBLEM LIST:    Principal Problem:    Septic shock (HCC)  Active Problems:    Pneumonia of right lung due to infectious organism    Paroxysmal atrial fibrillation (HCC)    Pleural effusion on right    Severe protein-calorie malnutrition    Palliative care encounter  Resolved Problems:    * No resolved hospital problems. *         DIET:    Diet NPO  ADULT TUBE FEEDING; Nasogastric; Peptide Based; Continuous; 20; Yes; 10; Q 4 hours; 55; 30; Q 4 hours; Protein; 1 Dose; Daily     MEDS (scheduled):    
[facility]  MD Christian Parrish MD Lisa Kniska, CNP   Johnna Montero, EMORY Mlevin, HANK  Progress Note    6/19/2025    SUBJECTIVE:   6/16: Seen while in the ICU. Eyes closed, seems to be resting comfortably. He does awaken to voice. He does appear to be mildly dyspneic on exam, though denies any complaints of worsening shortness of breath. He denies any diarrhea, nausea, or vomiting. Pleurix cath drained yesterday for 650 cc. Urine output picked up last evening, he is nonoliguric. He continues on levophed at 6.6 mcg.     6/17: Remains critically ill, in the ICU. Trach exchanged to cuffed, now on the ventilator. He is sedated. He remains on levophed. Blood pressure dropped slightly after receiving sedation. Urine output is excellent. Aggressive pulmonary hygiene started. CXR to be repeated, may need a bronch pending those results.     6/18: Awake, alert, interactive and appropriate.  On ventilator, vent settings stable.  Breathing comfortably.  BP stable.    6/19: Hemodynamically stable.  On ventilator via tracheostomy.  Denies new complaint.  Does have bilateral upper extremity edema, mild     PROBLEM LIST:    Principal Problem:    Septic shock (HCC)  Active Problems:    Pneumonia of right lung due to infectious organism    Paroxysmal atrial fibrillation (HCC)    Pleural effusion on right    Severe protein-calorie malnutrition  Resolved Problems:    * No resolved hospital problems. *         DIET:    Diet NPO  ADULT TUBE FEEDING; Nasogastric; Peptide Based; Continuous; 20; Yes; 10; Q 4 hours; 55; 30; Q 4 hours; Protein; 1 Dose; Daily     MEDS (scheduled):    chlorhexidine  15 mL Mouth/Throat BID    acetylcysteine  600 mg Inhalation Q4H    pantoprazole (PROTONIX) 40 mg in sodium chloride (PF) 0.9 % 10 mL injection  40 mg IntraVENous Daily    piperacillin-tazobactam  4,500 mg IntraVENous Q12H    sodium chloride flush  5-40 mL IntraVENous 2 times per day    heparin (porcine) 
[facility]  MD Christian Parrish, MD Tri Santana, CNP   Johnna Montero, EMORY Melvin, HANK  Progress Note    6/18/2025    SUBJECTIVE:   6/16: Seen while in the ICU. Eyes closed, seems to be resting comfortably. He does awaken to voice. He does appear to be mildly dyspneic on exam, though denies any complaints of worsening shortness of breath. He denies any diarrhea, nausea, or vomiting. Pleurix cath drained yesterday for 650 cc. Urine output picked up last evening, he is nonoliguric. He continues on levophed at 6.6 mcg.     6/17: Remains critically ill, in the ICU. Trach exchanged to cuffed, now on the ventilator. He is sedated. He remains on levophed. Blood pressure dropped slightly after receiving sedation. Urine output is excellent. Aggressive pulmonary hygiene started. CXR to be repeated, may need a bronch pending those results.     6/18: Awake, alert, interactive and appropriate.  On ventilator, vent settings stable.  Breathing comfortably.  BP stable.     PROBLEM LIST:    Principal Problem:    Septic shock (HCC)  Active Problems:    Pneumonia of right lung due to infectious organism  Resolved Problems:    * No resolved hospital problems. *         DIET:    Diet NPO     MEDS (scheduled):    chlorhexidine  15 mL Mouth/Throat BID    acetylcysteine  600 mg Inhalation Q4H    pantoprazole (PROTONIX) 40 mg in sodium chloride (PF) 0.9 % 10 mL injection  40 mg IntraVENous Daily    piperacillin-tazobactam  4,500 mg IntraVENous Q12H    sodium chloride flush  5-40 mL IntraVENous 2 times per day    heparin (porcine)  5,000 Units SubCUTAneous 3 times per day    hydrocortisone sodium succinate PF (SOLU-CORTEF) 100 mg in sterile water 2 mL injection  100 mg IntraVENous Q8H    arformoterol tartrate  15 mcg Nebulization BID RT    ipratropium 0.5 mg-albuterol 2.5 mg  1 Dose Inhalation 4x Daily RT    budesonide  250 mcg Nebulization BID    white petrolatum   Topical BID       MEDS 
[facility]  MD Christian Parrish, MD Tri Santana, CNP   Johnna Montero, EMORY Melvin, HANK  Progress Note    6/22/2025    SUBJECTIVE:   6/16: Seen while in the ICU. Eyes closed, seems to be resting comfortably. He does awaken to voice. He does appear to be mildly dyspneic on exam, though denies any complaints of worsening shortness of breath. He denies any diarrhea, nausea, or vomiting. Pleurix cath drained yesterday for 650 cc. Urine output picked up last evening, he is nonoliguric. He continues on levophed at 6.6 mcg.     6/17: Remains critically ill, in the ICU. Trach exchanged to cuffed, now on the ventilator. He is sedated. He remains on levophed. Blood pressure dropped slightly after receiving sedation. Urine output is excellent. Aggressive pulmonary hygiene started. CXR to be repeated, may need a bronch pending those results.     6/18: Awake, alert, interactive and appropriate.  On ventilator, vent settings stable.  Breathing comfortably.  BP stable.    6/19: Hemodynamically stable.  On ventilator via tracheostomy.  Denies new complaint.  Does have bilateral upper extremity edema, mild     6/20: Seen while laying in bed, in the ICU. Awake and alert. Mechanically ventilated via trach. Undergoing SBT earlier this morning. He does admit to some dyspnea and feels he needs suctioned. He is awaiting CorePak placement. No lower extremity edema noted. On amiodarone.     6/21: Remains critically ill, in the ICU.  On ventilator via tracheostomy tube.  Vent settings stable.  Breathing comfortably on vent.  Awake alert interactive.  Denies complaint.  Swelling a little bit worse.  Leukocytosis, Bactrim added    6/22: Seen in the ICU.  Awake, alert, interactive and appropriate.  Wife at bedside.  Swelling improving, though still present.  Breathing little more easily.    PROBLEM LIST:    Principal Problem:    Sepsis with acute hypoxic respiratory failure (HCC)  Active Problems:    
[facility]  MD Christian Parrish, MD Tri Santana, CNP   Johnna Montero, EMORY Melvin, HANK  Progress Note    6/23/2025    SUBJECTIVE:   6/16: Seen while in the ICU. Eyes closed, seems to be resting comfortably. He does awaken to voice. He does appear to be mildly dyspneic on exam, though denies any complaints of worsening shortness of breath. He denies any diarrhea, nausea, or vomiting. Pleurix cath drained yesterday for 650 cc. Urine output picked up last evening, he is nonoliguric. He continues on levophed at 6.6 mcg.     6/17: Remains critically ill, in the ICU. Trach exchanged to cuffed, now on the ventilator. He is sedated. He remains on levophed. Blood pressure dropped slightly after receiving sedation. Urine output is excellent. Aggressive pulmonary hygiene started. CXR to be repeated, may need a bronch pending those results.     6/18: Awake, alert, interactive and appropriate.  On ventilator, vent settings stable.  Breathing comfortably.  BP stable.    6/19: Hemodynamically stable.  On ventilator via tracheostomy.  Denies new complaint.  Does have bilateral upper extremity edema, mild     6/20: Seen while laying in bed, in the ICU. Awake and alert. Mechanically ventilated via trach. Undergoing SBT earlier this morning. He does admit to some dyspnea and feels he needs suctioned. He is awaiting CorePak placement. No lower extremity edema noted. On amiodarone.     6/21: Remains critically ill, in the ICU.  On ventilator via tracheostomy tube.  Vent settings stable.  Breathing comfortably on vent.  Awake alert interactive.  Denies complaint.  Swelling a little bit worse.  Leukocytosis, Bactrim added    6/22: Seen in the ICU.  Awake, alert, interactive and appropriate.  Wife at bedside.  Swelling improving, though still present.  Breathing little more easily.    6/23: Seen while sitting up in bed, no acute distress. He is mechanically ventilated via trach. Was on pressure 
CRITICAL CARE   SERVICE DAILY PROGRESS  NOTE     6/24/2025   Hospital  LOS: 9 days      Admit date- 6/15/2025       Initially admitted for -   Respiratory Distress (From NH increased sob 75% on 10L HF at facility arrived on c-pap. Current ATB use. Capped trach )       Subjective:      New leukocytosis , Upper quadrant US pending   No new symptoms   Awake and alert     Review of Systems        General- No headaches , dizzinesss, syncope   Pulmonary - No chest pain , hemoptysis   Cardiovascular- No chest pain,   GI- No abd pain ,No difficulty swallowing, diarrhea , no vomiting   Musculoskeletal- No extremity weakness, no edema , no cyanosis   Genitourinary- No burning urination, no dysuria, no incontinence  Neuro- NO syncope , AMS  Or weakness , No tingling , no numbness.   Skin- No rashes , no cyanosis.   Psychiatric/Behavioral: Negative for confusion, hallucinations and sleep disturbance. The patient is not nervous/anxious.                     Allergies:  Allergies   Allergen Reactions    Rocephin [Ceftriaxone] Angioedema     DO NOT GIVE!    Shellfish-Derived Products Anaphylaxis and Nausea And Vomiting     Home Meds:  Prior to Admission medications    Medication Sig Start Date End Date Taking? Authorizing Provider   amLODIPine (NORVASC) 5 MG tablet Take 1 tablet by mouth daily 2/4/25   Bouchra Vazquez APRN - CNP   guaiFENesin 400 MG tablet Take 1 tablet by mouth in the morning, at noon, and at bedtime 2/3/25   Bouchra Vazquez APRN - CNP   budesonide-formoterol (SYMBICORT) 160-4.5 MCG/ACT AERO Inhale 2 puffs into the lungs 2 times daily    ProviderRaul MD   tiotropium (SPIRIVA) 18 MCG inhalation capsule Inhale 1 capsule into the lungs daily    Raul Orozco MD   albuterol sulfate  (90 Base) MCG/ACT inhaler Inhale 2 puffs into the lungs every 4 hours as needed for Wheezing or Shortness of Breath    ProviderRaul MD     Scheduled Meds:   potassium & sodium phosphates  1 packet Per NG tube 
Called and updated patients' wife Kathi.  Notified of patient's tachycardia and new medications now on, as well as new consults to EP and palliative care. Answered all questions.  
Chart accessed for admission to MICU.  
Comprehensive Nutrition Assessment    Type and Reason for Visit:  Initial, Consult, Nutrition support (TF O&M)    Nutrition Recommendations/Plan:     Continue NPO, Start Tube Feeding/ modify order to better meet needs:    Rec Peptide Based (Vital AF 1.2) @ 55 ml/hr + 1 proteinex daily.   Will provide:1320 ml tv, 1584 kcals, 99 gm pro (1656 kcals & 117 gm pro w/ mod), 1070 ml free water  Regimen meets 100% est calorie & protein needs    Pt at high risk for re-feeding syndrome   Monitor electrolytes/ replace prn- current K 3.3       Malnutrition Assessment:  Malnutrition Status:  Severe malnutrition (06/19/25 1307)    Context:  Chronic Illness     Findings of the 6 clinical characteristics of malnutrition:  Energy Intake:  75% or less estimated energy requirements for 1 month or longer  Weight Loss:  7.5% over 3 months (overall 16.5% x 4 mon per EMR review)     Body Fat Loss:   (Moderate) Orbital   Muscle Mass Loss:   (Moderate) Clavicles (pectoralis & deltoids), Temples (temporalis)  Fluid Accumulation:  No fluid accumulation    Strength:  Not Performed    Nutrition Assessment:    Pt admit from [facility] w/ Respiratory failure/ Septic Shock 2/2 PNA + R pleural effusion s/p bronch w/ trach exchange, now on vent. Noted DARREN. PMHx Lung CA w/ mets to brain s/p XRT (follows @ [facility]), COPD, Respiratory insufficiency w/ R Pleurx cath placed/ trach placement 4/2024, & Recent [facility] stay x 1 mon ago. Noted coccyx wound site. Pt meets criteria for Severe Malnutrition. NGT in place for EN support. Will provide TF recs & monitor.    Nutrition Related Findings:    vent via trach, hypotension improved pressor weaned, +I/O's 5L, trace/+2 gen edema, active BS, NGT clamped, hypokalemia     Wound Type:  (coccyx wound site)       Current Nutrition Intake & Therapies:    Average Meal Intake: NPO    Current Tube Feeding (TF) Orders:  Feeding Route: Nasogastric  Formula: Standard with Fiber  Schedule: Continuous  Feeding Regimen: 50 
Coordination of care discussion and chart review with PM team.LSW met with pt and his wife for support while hospitalized. He does have a trach and is currently on vent. pt alert today, trying to interact during visit.He was admitted MICU with acute on chronic resp failure, hx of non small cell lung ca s/p radiation. Brain mets, RLL pnea, septic shock. No immediate PM psychosocial needs identified for Frank Niño.     
DAILY VENTILATOR WEANING ASSESSMENT PERFORMED    P/FIO2 Ratio =   NO AM ABG       (<100= do not Wean)                  Cs =                          (<32= Instability)  Plat. Pressure =     Was SAT completed no,     If yes proceed with the Spontaneous Breathing Trial (SBT) as long as none of the following exclusion criteria are met:     [] P/FIO2 <100  [] FiO2 >50%  [] Peep >10 CMH2O  [] Patient on vasopressor  [] Failed SAT  [] SpO2 < 88%  [] Active MI  [] Lack of inspiratory effort     If any exclusion criteria are meet Do Not Proceed to SBT.    Was SBT completed no    Ask Physician for a weaning plan no    Instabilities:    Cardiovascular     CNS      [] Mean BP less than or equal to 75   [] Neuromuscular blockade  [] Heart Rate greater than 130   [] RASS of -3, -4, -5  [] Mechanical Assist Device    []  RASS of +3, +4         [] ICP > 15 or Intracranial Hypertension         Respiratory      Metabolic   [] Temp. (8hrs) < 95 or > 103  [] Respiratory Rate greater than 35   [x] WBC < 5000 or > 97909  [] Minute Volume greater than 15L  [] pH less than 7.30  [] Deteriorating chest X-ray         Parameters    no    NIF=  VC=  MV =  RSBI =      Additional Comments:     Performed by Gloria Mcclendon RCP RRT                 
DAILY VENTILATOR WEANING ASSESSMENT PERFORMED    P/FIO2 Ratio =  1.96        (<100= do not Wean)                  Cs =   42                       (<32= Instability)  Plat. Pressure = 21    Was SAT completed yes,     If yes proceed with the Spontaneous Breathing Trial (SBT) as long as none of the following exclusion criteria are met:     [] P/FIO2 <100  [] FiO2 >50%  [] Peep >10 CMH2O  [] Patient on vasopressor  [] Failed SAT  [] SpO2 < 88%  [] Active MI  [] Lack of inspiratory effort     If any exclusion criteria are meet Do Not Proceed to SBT.    Was SBT completed yes    Ask Physician for a weaning plan yes    Instabilities:    Cardiovascular     CNS      [] Mean BP less than or equal to 75   [] Neuromuscular blockade  [] Heart Rate greater than 130   [] RASS of -3, -4, -5  [] Mechanical Assist Device    []  RASS of +3, +4         [] ICP > 15 or Intracranial Hypertension         Respiratory      Metabolic   [] Temp. (8hrs) < 95 or > 103  [] Respiratory Rate greater than 35   [] WBC < 5000 or > 88884  [] Minute Volume greater than 15L  [] pH less than 7.30  [] Deteriorating chest X-ray         Additional Comments: Patient has a 6XLT cuffed tracheostomy. Patient currently is in SBT PS 8 PEEP 8 FiO2 40%.    Performed by Karen Clement RCP RRT                
Date: 6/15/2025    Time: 1:23 AM    Patient Placed On BIPAP/CPAP/ Non-Invasive Ventilation?  Yes    If no must comment.  Facial area red/color change? No           If YES are Blister/Lesion present?No   If yes must notify nursing staff  BIPAP/CPAP skin barrier?  Yes    Skin barrier type:mepilexlite        06/15/25 0116   NIV Type   $NIV $Daily Charge   NIV Started/Stopped On   Equipment Type v60   Mode Bilevel   Mask Type Full face mask   Mask Size Medium   Assessment   Pulse (!) 102   Respirations 18   BP (!) 85/58   SpO2 94 %   Level of Consciousness 0   Comfort Level Fair   Using Accessory Muscles Yes   Mask Compliance Fair   Skin Assessment Clean, dry, & intact   Skin Protection for O2 Device Yes   Settings/Measurements   IPAP 12 cmH20   CPAP/EPAP 6 cmH2O   Vt (Measured) 477 mL   Insp Rise Time (%) 3 %   FiO2  100 %   I Time/ I Time % 0.9 s   Minute Volume (L/min) 11.8 Liters   Mask Leak (lpm) 38 lpm       Comments: Pt has Capped #6 Shiley XLT distal : Uncuffed. Per Dr. Funes, ok to put on NIV        Bel Tavarez RCP  
Date: 6/17/2025    Time: 4:51 AM    Patient Placed On BIPAP/CPAP/ Non-Invasive Ventilation?  No    If no must comment.  Facial area red/color change? No           If YES are Blister/Lesion present?No   If yes must notify nursing staff  BIPAP/CPAP skin barrier?  Yes    Skin barrier type:mepilexlite       Comments:        Roger Toro RCP  
End of Weaning attempt:     beginning at 0801 and ending at 0935.     HR 96bpm  RR 19bpm  SPO2 93%    Vaughn Hernandez RCP RRT       06/24/25 0935   Patient Observation   Pulse 96   Respirations 19   SpO2 93 %   Patient Observations Placed back on VC due to discomfort and tiredness.   Breath Sounds   Respiratory Pattern Regular   Vent Information   Ventilator ID [id number]   Vent Mode (S)  AC/VC   Ventilator Settings   Vt (Set, mL) 450 mL   Resp Rate (Set) 16 bpm   PEEP/CPAP (cmH2O) 8   FiO2  45 %   Peak Inspiratory Flow (Set) 70 L/sec   Vent Patient Data (Readings)   Vt (Measured) 382 mL   Peak Inspiratory Pressure (cmH2O) 36 cmH2O   Rate Measured 28 br/min   Minute Volume (L/min) 10.5 Liters   Peak Inspiratory Flow (lpm) 70 L/sec   Mean Airway Pressure (cmH2O) 18 cmH20   Plateau Pressure (cm H2O) 20 cm H2O   Driving Pressure 12   I:E Ratio 1:2.10   Flow Sensitivity 3 L/min   Backup Apnea On   Backup Rate 16 Breaths Per Minute   Backup Vt 450   Backup I Time 20   Vent Alarm Settings   Low Pressure (cmH2O)   (Plugged into wall)   High Pressure (cmH2O) 50 cmH2O   Low Minute Volume (lpm) 4 L/min   High Minute Volume (lpm) 15 L/min   Low Exhaled Vt (ml) 350 mL   High Exhaled Vt (ml) 700 mL   RR High (bpm) 30 br/min   Apnea (secs) 20 secs       
Inserted a small bowel feeding tube to 100cm robert using [employer] guidance system. Guidewire removed,bridle and xray ordered  
OCCUPATIONAL THERAPY INITIAL EVALUATION    [facility]  [address] [place], [place]     Date:2025                                                               Patient Name: Frank Niño  MRN: [id number]  : 1955  Room: [address] [place] [address]    Evaluating OT: Venecia Landa, OTD,  OTR/L; [id number]  Referring Provider:     Candido Landa MD     Specific Provider Orders/Date: OT eval and treat (25)       Diagnosis: Pleural effusion on right [J90]  Septic shock (HCC) [A41.9, R65.21]  Pneumonia of right lung due to infectious organism, unspecified part of lung [J18.9]  Sepsis with acute hypoxic respiratory failure, due to unspecified organism, unspecified whether septic shock present (HCC) [A41.9, R65.20, J96.01]  Acute hypoxic respiratory failure (HCC) [J96.01]     Reason for admission: Pt admitted with sepsis, lung CA with brain mets, A-fib.      Surgery/Procedures: : bronchoscopy, : intubated,   Previous admission: tracheostomy , liberated .    Pertinent Medical History:    No past medical history on file.     *Precautions:  Fall Risk, chronic trach, trach to vent, PEG, R pleurx catheter, alarms    Assessment of current deficits   [x] Functional mobility  [x]ADLs  [x] Strength               []Cognition   [x] Functional transfers   [x] IADLs         [x] Safety Awareness   [x]Endurance   [] Fine Coordination        [] ROM     [] Vision/perception   []Sensation    []Gross Motor Coordination [x] Balance   [] Delirium                  []Motor Control     [] Communication    OT PLAN OF CARE   OT POC based on physician orders, patient diagnosis and results of clinical assessment.       Frequency/Duration: 1-3 days/wk for 1-2 weeks PRN    Specific OT Treatment Interventions to include:   * Instruction/training on adapted ADL techniques and AE recommendations to increase functional independence within precautions       * Training on energy 
OCCUPATIONAL THERAPY TREATMENT SESSION  [facility]  [address] [place], [place]     Date:2025                                                               Patient Name: Frank Niño  MRN: [id number]  : 1955  Room: [address] [place] [address]    Evaluating OT: Venecia Landa, OTD,  OTR/L; [id number]  Referring Provider:     Candido Landa MD     Specific Provider Orders/Date: OT eval and treat (25)       Diagnosis: Pleural effusion on right [J90]  Septic shock (HCC) [A41.9, R65.21]  Pneumonia of right lung due to infectious organism, unspecified part of lung [J18.9]  Sepsis with acute hypoxic respiratory failure, due to unspecified organism, unspecified whether septic shock present (HCC) [A41.9, R65.20, J96.01]  Acute hypoxic respiratory failure (HCC) [J96.01]     Reason for admission: Pt admitted with sepsis, lung CA with brain mets, A-fib.      Surgery/Procedures: : bronchoscopy, : intubated,   Previous admission: tracheostomy , liberated .    Pertinent Medical History:    No past medical history on file.     *Precautions:  Fall Risk, chronic trach, trach to vent, NGT, R pleurx catheter, alarms    Assessment of current deficits   [x] Functional mobility  [x]ADLs  [x] Strength               []Cognition   [x] Functional transfers   [x] IADLs         [x] Safety Awareness   [x]Endurance   [] Fine Coordination        [] ROM     [] Vision/perception   []Sensation    []Gross Motor Coordination [x] Balance   [] Delirium                  []Motor Control     [] Communication    OT PLAN OF CARE   OT POC based on physician orders, patient diagnosis and results of clinical assessment.       Frequency/Duration: 1-3 days/wk for 1-2 weeks PRN    Specific OT Treatment Interventions to include:   * Instruction/training on adapted ADL techniques and AE recommendations to increase functional independence within precautions       * Training on energy 
OT SESSION ATTEMPT     Date:2025  Patient Name: Frank Niño  MRN: [id number]  : 1955  [facility]     Attempted OT session this date:    [x] unavailable due to other medical staff currently with pt   [] on hold, await MRI/ neurosurgical recommendations.   [] on hold per nursing staff secondary to lab / radiology results    [] declined Occupational Therapy  this date due to ___.  Benefits of participation in therapy reviewed with pt.    [] off unit   [] Other:     Will reattempt OT evaluation at a later time.    Venecia Landa OTD, OTR/L, [id number]    
PULMONARY  CRITICAL CARE   SERVICE DAILY PROGRESS  NOTE     6/18/2025   Hospital  LOS: 3 days      Admit date- 6/15/2025       Initially admitted for -   Respiratory Distress (From NH increased sob 75% on 10L HF at facility arrived on c-pap. Current ATB use. Capped trach )       Subjective:        Trached and sedated   Tracheostomy exchanged to cuffed , now on MV   S/p Bronchoscpoy on 06/17    Review of Systems      Unable to obtain secondary to mental status. Pt is trached  and sedated                      Allergies:  Allergies   Allergen Reactions    Rocephin [Ceftriaxone] Angioedema     DO NOT GIVE!    Shellfish-Derived Products Anaphylaxis and Nausea And Vomiting     Home Meds:  Prior to Admission medications    Medication Sig Start Date End Date Taking? Authorizing Provider   amLODIPine (NORVASC) 5 MG tablet Take 1 tablet by mouth daily 2/4/25   Bouchra Vazquez APRN - CNP   guaiFENesin 400 MG tablet Take 1 tablet by mouth in the morning, at noon, and at bedtime 2/3/25   Bouchra Vazquez APRN - CNP   budesonide-formoterol (SYMBICORT) 160-4.5 MCG/ACT AERO Inhale 2 puffs into the lungs 2 times daily    ProviderRaul MD   tiotropium (SPIRIVA) 18 MCG inhalation capsule Inhale 1 capsule into the lungs daily    ProviderRaul MD   albuterol sulfate  (90 Base) MCG/ACT inhaler Inhale 2 puffs into the lungs every 4 hours as needed for Wheezing or Shortness of Breath    ProviderRaul MD     Scheduled Meds:   chlorhexidine  15 mL Mouth/Throat BID    acetylcysteine  600 mg Inhalation Q4H    pantoprazole (PROTONIX) 40 mg in sodium chloride (PF) 0.9 % 10 mL injection  40 mg IntraVENous Daily    piperacillin-tazobactam  4,500 mg IntraVENous Q12H    sodium chloride flush  5-40 mL IntraVENous 2 times per day    heparin (porcine)  5,000 Units SubCUTAneous 3 times per day    hydrocortisone sodium succinate PF (SOLU-CORTEF) 100 mg in sterile water 2 mL injection  100 mg IntraVENous Q8H    arformoterol 
PULMONARY  CRITICAL CARE   SERVICE DAILY PROGRESS  NOTE     6/19/2025   Hospital  LOS: 4 days      Admit date- 6/15/2025       Initially admitted for -   Respiratory Distress (From NH increased sob 75% on 10L HF at facility arrived on c-pap. Current ATB use. Capped trach )       Subjective:      Sedatives off , on PRN fentanyl - not requiring   Trached and awake following commands   Tracheostomy exchanged to cuffed , now on MV   S/p Bronchoscpoy on 06/17    Review of Systems      General- No headaches , dizzinesss, syncope   Pulmonary - No chest pain , hemoptysis   Cardiovascular- No chest pain,   GI- No abd pain ,No difficulty swallowing, diarrhea , no vomiting   Musculoskeletal- No extremity weakness, no edema , no cyanosis   Genitourinary- No burning urination, no dysuria, no incontinence  Neuro- NO syncope , AMS  Or weakness , No tingling , no numbness.   Skin- No rashes , no cyanosis.   Psychiatric/Behavioral: Negative for confusion, hallucinations and sleep disturbance. The patient is not nervous/anxious.                     Allergies:  Allergies   Allergen Reactions    Rocephin [Ceftriaxone] Angioedema     DO NOT GIVE!    Shellfish-Derived Products Anaphylaxis and Nausea And Vomiting     Home Meds:  Prior to Admission medications    Medication Sig Start Date End Date Taking? Authorizing Provider   amLODIPine (NORVASC) 5 MG tablet Take 1 tablet by mouth daily 2/4/25   Bouchra Vazquez APRN - CNP   guaiFENesin 400 MG tablet Take 1 tablet by mouth in the morning, at noon, and at bedtime 2/3/25   Bouchra Vazquez APRN - CNP   budesonide-formoterol (SYMBICORT) 160-4.5 MCG/ACT AERO Inhale 2 puffs into the lungs 2 times daily    Raul Orozco MD   tiotropium (SPIRIVA) 18 MCG inhalation capsule Inhale 1 capsule into the lungs daily    Raul Orozco MD   albuterol sulfate  (90 Base) MCG/ACT inhaler Inhale 2 puffs into the lungs every 4 hours as needed for Wheezing or Shortness of Breath    Provider 
PULMONARY  CRITICAL CARE   SERVICE DAILY PROGRESS  NOTE     6/20/2025   Hospital  LOS: 5 days      Admit date- 6/15/2025       Initially admitted for -   Respiratory Distress (From NH increased sob 75% on 10L HF at facility arrived on c-pap. Current ATB use. Capped trach )       Subjective:      Awake and alert   Continues to require MV   Need to start SBT today     Review of Systems        General- No headaches , dizzinesss, syncope   Pulmonary - No chest pain , hemoptysis   Cardiovascular- No chest pain,   GI- No abd pain ,No difficulty swallowing, diarrhea , no vomiting   Musculoskeletal- No extremity weakness, no edema , no cyanosis   Genitourinary- No burning urination, no dysuria, no incontinence  Neuro- NO syncope , AMS  Or weakness , No tingling , no numbness.   Skin- No rashes , no cyanosis.   Psychiatric/Behavioral: Negative for confusion, hallucinations and sleep disturbance. The patient is not nervous/anxious.                     Allergies:  Allergies   Allergen Reactions    Rocephin [Ceftriaxone] Angioedema     DO NOT GIVE!    Shellfish-Derived Products Anaphylaxis and Nausea And Vomiting     Home Meds:  Prior to Admission medications    Medication Sig Start Date End Date Taking? Authorizing Provider   amLODIPine (NORVASC) 5 MG tablet Take 1 tablet by mouth daily 2/4/25   Bouchra Vazquez APRN - CNP   guaiFENesin 400 MG tablet Take 1 tablet by mouth in the morning, at noon, and at bedtime 2/3/25   Bouchra Vazquez APRN - CNP   budesonide-formoterol (SYMBICORT) 160-4.5 MCG/ACT AERO Inhale 2 puffs into the lungs 2 times daily    ProviderRaul MD   tiotropium (SPIRIVA) 18 MCG inhalation capsule Inhale 1 capsule into the lungs daily    Raul Orozco MD   albuterol sulfate  (90 Base) MCG/ACT inhaler Inhale 2 puffs into the lungs every 4 hours as needed for Wheezing or Shortness of Breath    ProviderRaul MD     Scheduled Meds:   chlorhexidine  15 mL Mouth/Throat BID    
PULMONARY  CRITICAL CARE   SERVICE DAILY PROGRESS  NOTE     6/21/2025   Hospital  LOS: 6 days      Admit date- 6/15/2025       Initially admitted for -   Respiratory Distress (From NH increased sob 75% on 10L HF at facility arrived on c-pap. Current ATB use. Capped trach )       Subjective:      No new sx   Has worsening leukocytosis   HD stable , not on pressors      Review of Systems        General- No headaches , dizzinesss, syncope   Pulmonary - No chest pain , hemoptysis   Cardiovascular- No chest pain,   GI- No abd pain ,No difficulty swallowing, diarrhea , no vomiting   Musculoskeletal- No extremity weakness, no edema , no cyanosis   Genitourinary- No burning urination, no dysuria, no incontinence  Neuro- NO syncope , AMS  Or weakness , No tingling , no numbness.   Skin- No rashes , no cyanosis.   Psychiatric/Behavioral: Negative for confusion, hallucinations and sleep disturbance. The patient is not nervous/anxious.                     Allergies:  Allergies   Allergen Reactions    Rocephin [Ceftriaxone] Angioedema     DO NOT GIVE!    Shellfish-Derived Products Anaphylaxis and Nausea And Vomiting     Home Meds:  Prior to Admission medications    Medication Sig Start Date End Date Taking? Authorizing Provider   amLODIPine (NORVASC) 5 MG tablet Take 1 tablet by mouth daily 2/4/25   Bouchra Vazquez APRN - CNP   guaiFENesin 400 MG tablet Take 1 tablet by mouth in the morning, at noon, and at bedtime 2/3/25   Bouchra Vazquez APRN - CNP   budesonide-formoterol (SYMBICORT) 160-4.5 MCG/ACT AERO Inhale 2 puffs into the lungs 2 times daily    ProviderRaul MD   tiotropium (SPIRIVA) 18 MCG inhalation capsule Inhale 1 capsule into the lungs daily    Raul Orozco MD   albuterol sulfate  (90 Base) MCG/ACT inhaler Inhale 2 puffs into the lungs every 4 hours as needed for Wheezing or Shortness of Breath    ProviderRaul MD     Scheduled Meds:   levothyroxine  25 mcg Oral Daily    
Patient admitted to MICU with bracelet and glasses. Belongings remain with patient.   
Patient becoming more tachycardic in 120's and O 2 sat 89%-91%, RT to bedside to eval, then NP Elyse to bedside, orders received and portable CXR obtained. Drained pleur -X for 850 cc of straw colored fluid with NP at bedside. Patient O 2 sat immediately responded up to 95%.  
Patient is uncomfortable with the NGT placed, family spoke with the intensivist about a small bore feeding tube. NGT removed and consult placed to IV team.   
Patient lower extremities from thighs to feet are note ably modeled, D Pulses are at +3, NP Elyse notified.  
Patient placed back on VC+ due to patient wanting to rest.       06/20/25 1213   Patient Observation   Pulse (!) 115   Respirations 19   SpO2 100 %   Breath Sounds   Respiratory Pattern Regular   Breath Sounds Bilateral Diminished   Right Upper Lobe Diminished   Right Middle Lobe Diminished   Right Lower Lobe Diminished   Left Upper Lobe Diminished   Left Lower Lobe Diminished   Vent Information   Ventilator ID [id number]   Vent Mode (S)  AC/PRVC   Ventilator Settings   Vt (Set, mL) 450 mL   Resp Rate (Set) 16 bpm   PEEP/CPAP (cmH2O) 8   FiO2  40 %   Insp Time (sec) 0.8 sec   Vent Patient Data (Readings)   Vt (Measured) 413 mL   Peak Inspiratory Pressure (cmH2O) 31 cmH2O   Rate Measured 17 br/min   Minute Volume (L/min) 7.98 Liters   Mean Airway Pressure (cmH2O) 14 cmH20   Plateau Pressure (cm H2O) 21 cm H2O   Driving Pressure 13   I:E Ratio 1:2.90   Flow Sensitivity 3 L/min   I Time/ I Time % 0.8 s   Backup Apnea On   Backup Rate 16 Breaths Per Minute   Backup Vt 450   Backup I Time 20   Vent Alarm Settings   Low Pressure (cmH2O) 11.5 cmH2O  (Plugged into wall)   High Pressure (cmH2O) 50 cmH2O   Low Minute Volume (lpm) 4 L/min   High Minute Volume (lpm) 15 L/min   Low Exhaled Vt (ml) 350 mL   High Exhaled Vt (ml) 700 mL   RR High (bpm) 30 br/min   Apnea (secs) 20 secs   Additional Respiratoray Assessments   Humidification Source Heated wire   Humidification Temp 37   Circuit Condensation Drained   Ambu Bag With Mask At Bedside Yes   Backup Trachs Available (Size) 6.0  (6.)   Airway Clearance   Suction Trach   Subglottic Suction Done No   Suction Device Inline suction catheter   Sputum Method Obtained Tracheal   Sputum Amount Small   Sputum Color/Odor Tan;White   Sputum Consistency Thick       
Patient placed in SBT at this time.      06/20/25 1003   Patient Observation   Pulse (!) 112   SpO2 97 %   Vent Information   Ventilator ID [id number]   Vent Mode (S)  CPAP/PS   Ventilator Settings   PEEP/CPAP (cmH2O) 8   FiO2  40 %   Pressure Support (cm H2O) (S)  8 cm H2O   Vent Patient Data (Readings)   Vt (Measured) 394 mL   Peak Inspiratory Pressure (cmH2O) 17 cmH2O   Rate Measured 17 br/min   Minute Volume (L/min) 7.34 Liters   Mean Airway Pressure (cmH2O) 11 cmH20   Plateau Pressure (cm H2O) 21 cm H2O   Driving Pressure 13   I:E Ratio 1:2.20   Flow Sensitivity 3 L/min   Backup Apnea On   Backup Rate 16 Breaths Per Minute   Backup Vt 450   Backup I Time 20   Vent Alarm Settings   Low Pressure (cmH2O) 11.5 cmH2O  (Red cord plugged into the wall)   High Pressure (cmH2O) 50 cmH2O   Low Minute Volume (lpm) 4 L/min   High Minute Volume (lpm) 15 L/min   Low Exhaled Vt (ml) 350 mL   High Exhaled Vt (ml) 700 mL   RR High (bpm) 30 br/min   Apnea (secs) 20 secs   Additional Respiratoray Assessments   Humidification Source Heated wire   Humidification Temp 36.9   Circuit Condensation Not drained   Ambu Bag With Mask At Bedside Yes       
Patient tachycardic in 200's, stat EKG obtained and NP Elyse called to bedside, orders received, amiodarone bolus and gtt started. Levophed gtt weaned off and neosynephrine gtt started. Labs obtained, dose of albumin given. Additional peripheral IV obtained.  
Perfect serve sent to Dr. Art regarding BMP results.   
Perfect serve sent to Dr. Art regarding BMP results.   
Pharmacy Consultation Note  (Antibiotic Dosing and Monitoring)    Initial consult date: 6/15/25  Consulting physician/provider: Eagle Bales DO   Drug: Vancomycin  Indication: Pneumonia (Nosocomial) / Sepsis    Age/  Gender Height Weight IBW  Allergy Information   69 y.o./male  177.8 cm (5' 10\") 76.2 kg (168 lb)     Ideal body weight: 73 kg (160 lb 15 oz)  Adjusted ideal body weight: 74.3 kg (163 lb 12.2 oz)   Rocephin [ceftriaxone] and Shellfish-derived products      Renal Function:  Recent Labs     06/15/25  0132 06/15/25  0230   BUN 98* 98*   CREATININE 5.6* 5.6*     No intake or output data in the 24 hours ending 06/15/25 0534    Vancomycin Monitoring:  Trough:  No results for input(s): \"VANCOTROUGH\" in the last 72 hours.  Random:  No results for input(s): \"VANCORANDOM\" in the last 72 hours.    Vancomycin Administration Times:      Recent vancomycin administrations                     vancomycin (VANCOCIN) 1,500 mg in sodium chloride 0.9 % 250 mL IVPB ([id number]) (mg) 1,500 mg New Bag 06/15/25 0534                   Assessment:  Patient is a 69 y.o. male who has been initiated on vancomycin  Estimated Creatinine Clearance: 13 mL/min (A) (based on SCr of 5.6 mg/dL (H)).  To dose vancomycin, pharmacy will be utilizing dosing based off of levels because of patient's renal impairment/insufficiency    Plan:  Vancomycin 1500 mg once.   Will check a vancomycin random level 6/16 am.   Will continue to monitor renal function   Pharmacy to follow      Mono Berger, PharmD, [facility]      SEY: [phone]    
Pharmacy Consultation Note  (Antibiotic Dosing and Monitoring)    Initial consult date: 6/15/25  Consulting physician/provider: Eagle Bales DO   Drug: Vancomycin  Indication: Pneumonia (Nosocomial) / Sepsis    Vancomycin has been discontinued   Clinical Pharmacy to sign-off  Physician to re-consult pharmacy if future dosing is needed    Thank you for the consult,    David Lutz, PharmD, BCPS, BCCCP 6/16/2025 12:32 PM  Phone: [phone]    
Physical Therapy    PT consult to evaluate/treat received and appreciated.  Pt chart reviewed and dicussed at quality flow rounds.  Will hold d/t recent tachycardia with HR over 200 per chart review.  Pt has EP consult.  Will check back as able.  Thank you.        Jun Cox, PT, DPT   [id number]        
Physical Therapy    PT consult to evaluate/treat received and appreciated.  Pt chart reviewed and evaluation attempted.  Pt is currently not available for PT d/t other medical staff present at time of attempt.  Will check back as able.  Thank you.        Jun Cox, PT, DPT   [id number]       
Physical Therapy    PT consult to evaluate/treat received and appreciated.  Pt chart reviewed and evaluation attempted.  Pt's HR in 170s-180s at time of attempt and pt c/o SOB.  Nursing staff was notified.  Will check back as able.  Thank you.        Jun Cox, PT, DPT   [id number]       
Physical Therapy    Physical Therapy Daily Treatment Note       Name: Frank Niño  : 1955  MRN: [id number]      Date of Service: 2025    Evaluating PT:  Jun Cox, PT, DPT  [id number]     Room #:  4407/4407-A  Diagnosis:  Pleural effusion on right [J90]  Septic shock (HCC) [A41.9, R65.21]  Pneumonia of right lung due to infectious organism, unspecified part of lung [J18.9]  Sepsis with acute hypoxic respiratory failure, due to unspecified organism, unspecified whether septic shock present (HCC) [A41.9, R65.20, J96.01]  Acute hypoxic respiratory failure (HCC) [J96.01]  PMHx/PSHx:   has no past medical history on file.   Procedure/Surgery:  Bronch    Precautions:  Falls, Chronic trach, Trach to vent, R pleurx, PEG, Alarms   Equipment Needs:  TBD    SUBJECTIVE:    Pt admitted from [facility]- reports he was primarily bed level, completing SPT with assistance.    OBJECTIVE:   Initial Evaluation  Date: 25 Treatment  25  Short Term/ Long Term   Goals   AM-PAC 6 Clicks      Was pt agreeable to Eval/treatment? Yes  Yes     Does pt have pain? No c/o pain  No c/o pain     Bed Mobility  Rolling: Max A  Supine to sit: Max A x2  Sit to supine: Max A x2  Scooting: Max A to EOB  Rolling: Max A  Supine to sit: Refused   Sit to supine: Refused   Scooting: Max A to EOB  Rolling: Min A  Supine to sit: Min A  Sit to supine: Min A  Scooting: Min A   Transfers Sit to stand: NT  Stand to sit: NT  Stand pivot: NT NT Sit to stand: Mod A  Stand to sit: Mod A  Stand pivot: Mod A with AAD    Ambulation    NT NT >5 feet with AAD Max A   Stair negotiation: ascended and descended  NT NT NA   ROM BUE:  Per OT eval   BLE:  WFL BLE:  WFL    Strength BUE:  Per OT eval   BLE:  grossly 3-/5 BLE:  grossly 3-/5    Balance Sitting EOB:  Min<>mod A  Dynamic Standing:  NT Refused  Sitting EOB:  SBA  Dynamic Standing:  Mod A with AAD      Pt is A & O x 4  RASS:  0  CAM-ICU:  NT  Sensation:  Pt denies numbness and tingling to 
Pt refused BiPAP stating that he felt like it was suffocating him. Placed back on AirVo.  
Respiratory Wean Start time: 0828 AM  Order to wean ventilator Yes (if no, contact provider)    Patient placed on PS of 8, PEEP 8 cmH2O, FIO2 40 at 0828 AM.     bpm  RR 15 bpm  SPO2 96%      End of Weaning attempt: 1004 AM    HR 116bpm  RR 21 bpm  SPO2 96%    Patient complained of feeling short of breath. Patient was suctioned for a small amount of secretions. Patient requested to be taken off of wean. Spoke with RN about possibly treating anxiety. Will try to wean again as able.      Performed by  Maddy Whyte RRT  
Trach exchange requested by Dr. Howell.  Pt currently on Air-Vo.  Appears comfortable.      Explained to pt request for trach exchange.  Pt states he doesn't want trach exchanged at this time.    Vik Alarcon MD, FACS  6/16/2025  6:52 PM      
Vascular Access Procedure Note    Procedure Date:   6/22/2025    Pre-procedure Verification/Time-Out:  The proposed risks versus benefits of this procedure were discussed in detail by the physician with spouse Kathi Niño and Frank ayala. written consent was obtained from Spouse Kathi and Mauro Marie . Relevant documentation was reviewed prior to procedure including signed consent form and medications.   All necessary equipment for procedure is available at time of procedure yes.  An audible time out was done at 1105AM by team members, correctly identifying patients name, medical record number, correct side, correct site, and correct procedure to be performed with registered nurse members of the procedure team all in agreement.        Indication for Procedure:   Reason for Insertion: intravenous antibiotics    ASA Assessment (Required for Moderate & Deep Sedation):      Procedure:   Reason for Consultation: power PICC line    Clinician Performing Procedure:   NORBERTO Sheets RN    Assistant:  none    Sedation:   Analgesia Used: lidocaine 1%    Procedure Details/Findings:  Catheter [place] Size: 5.5  Lot Number: [id number]  Product #: [id number]  Expiration Date: 02/27/2026  Maximum Barrier Precautions: cap, eye shield, full body drape, gloves, gown, handwashing, and mask  Skin Prep: chlorhexidine  Technique: modified seldinger and ultrasound guided with VPS  Attempts: 1  Exposed (cm): 0.5  Total (cm): 41  Placement Site: right brachial vein  Vessel Size: 0.50  Dressing: securement device, transparent dressing, and biopatch  Blood Return: Yes   Ultrasound Guidance: Yes   Arm Circumference Mid-Bicep (cm): 28  Chest X-Ray Ordered: VasoNova VPS  End Placement: caj/lower svc    Complications:   none     Post-operative Condition:  stable  Patient Tolerated Procedure: well     Comments/Post-operative Education:   Post Procedure Interventions: no blood pressure sign placed above bed    Hiram Sheets RN  06/22/25  11:39 AM     
discharge. Pharynx clear.  Dry mucous membranes  Neck: #6 XLT trach intact capped  Resp: No accessory muscle use.  Diminished breath sounds throughout  CV: Regular rate. Regular rhythm. No mumur or rub. No edema.   ABD: Non-tender. Non-distended.  Normal bowel sounds.   Skin: Warm and dry. No nodule on exposed extremities. No rash on exposed extremities.  M/S: No cyanosis. No joint deformity. No clubbing.   Neuro: Awake. Follows commands.  Oriented x 4    I/O: I/O last 3 completed shifts:  In: 4027.2 [I.V.:1774.2; IV Piggyback:2253]  Out: 2860 [Urine:810; Other:650; Chest Tube:1400]  No intake/output data recorded.     Results:  CBC:   Recent Labs     06/15/25  0132 06/15/25  1446 06/16/25  0402   WBC 27.6* 26.4* 16.4*   HGB 11.5* 10.2* 7.8*   HCT 36.0* 33.4* 25.7*   MCV 91.4 94.9 94.8    336 238     BMP:   Recent Labs     06/15/25  1446 06/15/25  1759 06/15/25  2323 06/16/25  0402   * 134* 132* 135*   K 6.1* 5.5* 5.5* 4.8   CL 94* 97* 96* 99   CO2 24 20* 22 22   PHOS 8.7*  --   --  7.3*   BUN 93* 86* 86* 81*   CREATININE 5.2* 4.7* 4.7* 4.5*     LFT:   Recent Labs     06/15/25  0132 06/16/25  0402   ALKPHOS 106 59   ALT 25 13   AST 20 10   BILITOT 0.2 0.3     PT/INR:   Recent Labs     06/15/25  1446   PROTIME 11.8   INR 1.1     Cultures:  No results for input(s): \"CULTRESP\" in the last 72 hours.    ABG:   Recent Labs     06/16/25  0413   PH 7.276*   PO2 79.1   PCO2 50.0*   HCO3 22.8   BE -4.0*   O2SAT 95.1       Films:        CT ABDOMEN PELVIS WO CONTRAST Additional Contrast? None  Result Date: 6/15/2025    1. 2.1 cm linear metallic density within the gastric lumen. Correlate with ingestion history. 2. Small right pleural effusion with adjacent right lower lobe consolidation. Right basilar pleural drainage catheter. 3. Punctate hyperdensity within the dependent bladder, possibly bladder calculus.     XR CHEST PORTABLE  Result Date: 6/15/2025  EXAMINATION: ONE XRAY VIEW OF THE CHEST 6/15/2025 2:12 am 
adequate with decreased at the bases. no rhonchi , no crackles.   Cardiovascular-  S1, S2 , Mo, No rubs or gallops.    Abdomen-  Has PEG  tube in place ,  Soft , slightly distended  , Bowel sounds diminished , No organomegaly   Neuro-Grossly non focal ,  Pupils equal and reactive.   Extremities- 1 + pitting edema , No deformities   Skin- warm , Capillary refill normal , no new rashes.         Ventilator Settings:    Vent Mode: CPAP/PS  Resp Rate (Set): 16 bpm   Vt (Set, mL): 450 mL       PEEP/CPAP (cmH2O): 8   FiO2 : 45 %      Recent Labs     06/21/25  1400 06/22/25  0454 06/23/25  0436   WBC 24.1* 28.5* 32.3*   HGB 8.6* 8.6* 8.5*   HCT 28.4* 28.5* 26.9*     Recent Labs     06/22/25  0454 06/22/25  2353 06/23/25  0436    141 139   K 3.4* 3.4* 3.6    103 100   CO2 24 24 24   BUN 43* 45* 43*   CREATININE 2.5* 2.4* 2.3*   MG 1.6 1.9 2.0   PHOS 2.6  --  2.1*     No results for input(s): \"GLU\" in the last 72 hours.    Results       Procedure Component Value Units Date/Time    Culture, Blood 2 [[id number]] Collected: 06/23/25 0957    Order Status: Sent Specimen: Blood Updated: 06/23/25 1017    Culture, Blood 1 [[id number]] Collected: 06/23/25 0954    Order Status: Sent Specimen: Blood Updated: 06/23/25 1016    Clostridium Difficile Toxin/Antigen [[id number]] Collected: 06/23/25 0947    Order Status: Sent Specimen: Stool Updated: 06/23/25 1017    Culture, Respiratory (with Gram Stain) [[id number]]     Order Status: Sent Specimen: Tracheal Aspirate     Clostridium Difficile Toxin/Antigen [[id number]]     Order Status: Sent Specimen: Stool     Clostridium Difficile Toxin/Antigen [[id number]]     Order Status: Canceled Specimen: Stool     Culture, Respiratory (with Gram Stain) [[id number]]  (Abnormal)  (Susceptibility) Collected: 06/17/25 1715    Order Status: Completed Specimen: Bronchial Washing Updated: 06/21/25 0642     Specimen Description .BRONCHIAL WASHINGS     Direct Exam RARE EPITHELIAL CELLS      
position and alignment of the tubes and catheters are within the   normal range   3. Moderate right pleural effusion, unchanged         XR CHEST PORTABLE   Final Result   1. No significant interval change.   2. Right middle and lower lung zone airspace disease with small right pleural   effusion.         XR CHEST PORTABLE   Final Result   Moderate to large size right pleural effusion with unchanged ill-defined   opacification seen within the right mid and lower lung field. Trace left   pleural effusion.         XR CHEST PORTABLE   Final Result   1. No significant change from 1355 hours.   2. Moderate right pleural effusion with near complete   atelectasis/consolidation of the right lower lobe.   3. Stable tracheostomy tube position.         XR CHEST PORTABLE   Final Result   1. Pneumatization of the right upper lobe, right middle and lower lobes   remain mostly atelectatic due to a moderate right pleural effusion.   2. No pneumothorax.   3. Endotracheal tube in good position.         XR CHEST PORTABLE   Final Result   1. Volume loss in the right hemithorax with improved pneumatization of   portions of the right upper lobe and a large right pleural effusion.   2. Endotracheal tube in good position.   3. Diffuse interstitial opacity and mild ground-glass opacity in both lungs   which could reflect pulmonary edema in the proper clinical setting.         XR CHEST PORTABLE   Final Result   New complete opacification of the right chest.  Differentials include large   pleural effusion with consolidation or mucous plugging with atelectasis.   Other pathology not excluded.  Correlate with CT if needed.  Clinical and   imaging follow-up to resolution.      Physician call report initiated at 8:58 a.m. on 06/17/2025.         CT ABDOMEN PELVIS WO CONTRAST Additional Contrast? None   Final Result   1. 2.1 cm linear metallic density within the gastric lumen. Correlate with   ingestion history.   2. Small right pleural effusion 
107/57 -- -- 95 23 100 %   06/23/25 1700 111/69 -- -- 95 17 100 %   @      Intake/Output Summary (Last 24 hours) at 6/24/2025 1645  Last data filed at 6/24/2025 1600  Gross per 24 hour   Intake 4794.99 ml   Output 2300 ml   Net 2494.99 ml         Wt Readings from Last 3 Encounters:   06/19/25 75.8 kg (167 lb)   02/01/25 90.7 kg (199 lb 15.3 oz)   04/28/22 90.7 kg (200 lb)       Constitutional:  awake and alert, mechanically ventilated via trach   HEENT: NC/AT, EOMI, sclera and conjunctiva are clear and anicteric, mucus membranes moist  Neck: Trachea midline, no JVD, tracheostomy to vent   Cardiovascular: S1, S2 regular rhythm, no murmur,or rub  Respiratory:  Clear in the upper lobes, diminished in bilateral lung bases. No wheeze. Pleurix cath on the right  Gastrointestinal:  Soft, nontender, nondistended, NABS  Ext: +1 edema to the lower extremities, +1 upper extremity edema, (edema improving) feet warm  Skin: dry, no rash  Neuro: awake and alert        DATA:    Recent Labs     06/22/25  0454 06/23/25  0436 06/24/25  0400   WBC 28.5* 32.3* 28.0*   HGB 8.6* 8.5* 8.5*   HCT 28.5* 26.9* 27.8*   MCV 95.6 96.1 95.5     Recent Labs     06/22/25  0454 06/22/25  2353 06/23/25  0436 06/24/25  0400    141 139 137   K 3.4* 3.4* 3.6 3.8    103 100 98   CO2 24 24 24 25   MG 1.6 1.9 2.0 1.8   PHOS 2.6  --  2.1* 2.9   BUN 43* 45* 43* 45*   CREATININE 2.5* 2.4* 2.3* 2.1*   ALT 25  --  21 20   AST 15  --  14 14   BILITOT <0.2  --  <0.2 <0.2   ALKPHOS 93  --  89 88       No results found for: \"LABPROT\"    Assessment  69-year-old gentleman with advanced COPD, respiratory insufficiency status post tracheostomy now, ECF resident, presents with hypoxemia and hypotension/shock     DARREN, hemodynamically mediated, due to septic shock, volume contraction.  Baseline creatinine 1.0 mg/dL.  Recent history of DARREN at the clinic during hospitalization at the [facility], for same.  UO not recorded, though he is making urine so I 
Culture NO GROWTH 3 DAYS    Culture, Body Fluid (with Gram Stain) [[id number]]     Order Status: Sent Specimen: Body Fluid from Pleural     Culture, Respiratory [[id number]] Collected: 06/16/25 0402    Order Status: Completed Specimen: Sputum Expectorated Updated: 06/18/25 0716     Specimen Description .EXPECTORATED SPUTUM     Direct Exam NO Polymorphonuclear leukocytes      FEW EPITHELIAL CELLS      FEW GRAM POSITIVE RODS      RARE YEAST     Culture NORMAL RESPIRATORY MAICO    Culture, Urine [[id number]] Collected: 06/15/25 1229    Order Status: Completed Specimen: Urine, clean catch Updated: 06/16/25 1342     Specimen Description .CLEAN CATCH URINE     Special Requests Site: Urine     Culture NO GROWTH    Culture, MRSA, Screening [[id number]] Collected: 06/15/25 1229    Order Status: Completed Specimen: Nares Updated: 06/16/25 1358     Specimen Description .NARES     Culture NEGATIVE FOR: METHICILLIN RESISTANT STAPHYLOCOCCUS AUREUS    STREP PNEUMONIAE ANTIGEN [[id number]] Collected: 06/15/25 1229    Order Status: Completed Specimen: Urine, straight catheter Updated: 06/16/25 0632     Source .URINE     Strep pneumo Ag NEGATIVE     Comment:       Presumptive Negative  suggests no current or recent pneumococcal infection. Infection due to Strep pneumoniae   cannot be ruled out since the antigen present in the sample may be below the detection limit   of the test.         Legionella antigen, urine [[id number]] Collected: 06/15/25 1229    Order Status: Completed Specimen: Urine Updated: 06/16/25 0632     Legionella Pneumophilia Ag, Urine NEGATIVE     Comment:       L. pneumophila serogroup 1 antigen not detected.  A negative result does not exclude infection with Leginella pnemophila serogroup 1 nor does   it rule out other microbial-caused respiratory infections of disease caused by other   serogroups of Legionella pneumophila.         Culture, Body Fluid (with Gram Stain) [[id number]] Collected: 06/15/25 1130    
HOMAR  [id number]        
cannula  7.32/47/89/26/96%  Requiring Levophed, patient to be admitted to ICU.  proBNP 1389 troponins 540  CT A/P small right effusion right lower lobe consolidation right pleural catheter  6/16 60L 70% HHFNC, PleurX -750 mL overnight Tmax 99 on norepinephrine  7.36/45/91/19/96%.  Refused tracheostomy change  6/17 tracheostomy changed to cuffed Shiley due to increased respiratory distress.  Now on mechanical ventilation 16/450/55/+8.  On Levophed off vasopressin.  Chest x-ray in right pleural effusion  On Versed fentanyl.  Underwent bronchoscopy for mucous plugging  6/18 tachycardic A-fib RVR treated with amiodarone drip.  Overnight desaturated.  Chest x-ray moderate large right effusion Pleurx drained 850 cc.  Patient switched to Misael-Synephrine.  Seen by palliative care.  Currently on fentanyl and Versed  Seen by EPS.  ABGs with metabolic acidosis AC/16/450/50%/+8  Chest x-ray right middle RLL airspace disease small right effusion.  Off fluids sedation and pressors awake alert  6/19 AC 16/450 40/+8.  +5.6 L  6/20/25 hours of pressure support of 8.  Back on amiodarone drip.  With NG tube  6/21/2025 on on pressure support trial  ABG this morning mild acidosis.  AC 16/450/40/+8.  Positive for 5.1 L  Off amiodarone drip now on p.o.      Assessment/Plan:  S/p septic shock   Acute on chronic Hypoxic and hypercapnic respiratory failure, baseline was 4lnc   With tracheostomy placed 4/11 #7.5 bivona ,downsized trach to #6 XLT cuffless on 5/24, liberated 5/24, capped since 5/25 6/17 changed back to cuffed trach/back on mechanical ventilation   Mucous plugging  S/p bronchoscopy 6/17   on Mucinex aerosols  Acute renal failure  Bladder calculus on CT Abd  Improving  Stenotrophomonas maltoplilia RLL pneumonia   Now on Bactrim  Malignant right effusion  Drain daily   (+) staph epidermidis felt to be contaminant 6/15  6/17 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 
downsized to #6 XLT cuffless  6/10: capped on 5L on discharge to [facility].     6/10/2025 - 6/15/2025 at [facility]    6/15/25: ER with respiratory distress from [facility], 75% on 10 L high flow nasal cannula  7.32/47/89/26/96%  Requiring Levophed, patient to be admitted to ICU.  proBNP 1389 troponins 540  CT A/P small right effusion right lower lobe consolidation right pleural catheter  6/16 60L 70% HHFNC, PleurX -750 mL overnight Tmax 99 on norepinephrine  7.36/45/91/19/96%.  Refused tracheostomy change  6/17 tracheostomy changed to cuffed Shiley due to increased respiratory distress.  Now on mechanical ventilation 16/450/55/+8.  On Levophed off vasopressin.  Chest x-ray in right pleural effusion  On Versed fentanyl.  Underwent bronchoscopy for mucous plugging  6/18 tachycardic A-fib RVR treated with amiodarone drip.  Overnight desaturated.  Chest x-ray moderate large right effusion Pleurx drained 850 cc.  Patient switched to Misael-Synephrine.  Seen by palliative care.  Currently on fentanyl and Versed  Seen by EPS.  ABGs with metabolic acidosis AC/16/450/50%/+8  Chest x-ray right middle RLL airspace disease small right effusion.  Off fluids sedation and pressors awake alert  6/19 AC 16/450 40/+8.  +5.6 L    Assessment/Plan:  S/p septic shock   Acute on chronic Hypoxic and hypercapnic respiratory failure, baseline was 4lnc   Required intubation x2  Status post tracheostomy 4/11 #7.5 bivona  Downsized trach to #6 XLT cuffless on 5/24,Liberated 5/24, capped since 5/25 6/17 cuffed trach/back on mechanical ventilation   Mucous plugging  Status post bronchoscopy 6/17   on Mucinex  Acute renal failure  Bladder calculus on CT Abd  Stenotrophomonas maltoplilia RLL pneumonia   broad-spectrum antibiotics with Zosyn   Malignant right effusion  Drain daily   (+) staph epidermidis felt to be contaminant 6/15  6/17 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 
Cardiology.  Echocardiogram: 4/3/25  CONCLUSIONS:   - Technically difficult exam due to body habitus, mechanical ventilation and   suboptimal positioning.   - Exam indication: Hypoxemia of uncertain etiology   - The left ventricle is normal in size. Left ventricular systolic function is   normal. EF = 55 ± 5% (visual est.)   - The right ventricle is normal in size. Right ventricular systolic function is  normal.   - Agitated saline was administered to rule out shunt [clips: 18]. There is evidence of intrapulmonary shunting as detected by agitated saline contrast after 6 beats.    Very limited study     I have independently reviewed all of the ECGs and rhythm strips per above     Assessment/Plan: This is a 69 y.o. male with a history of     1. New onset atrial fibrillation with RVR  -First noted 4/10/25 on EKG at [facility], converted to sinus after bolus of amiodarone was given and lytes repletion.   -on amiodarone drip presently  -repeat EKG, showing NSR, patient has converted  -on heparin Q8hrs    Patient maintains sinus rhythm on oral amiodarone    2. Acute on chronic respiratory failure  -Required intubation x2  -Status post tracheostomy 4/11   Near complete collapse of right hemithorax secondary to endobronchial obstruction by tumor as well as mucous plugging  Severe COPD  Malignant right pleural effusion    3. Right lower lobe pneumonia  -On Zosyn ,blood cultures and respiratory cultures no growth to date PleurX catheter culture showing Staph epidermidis which is likely contamination.   -ID following patient    4. Acute renal failure  -nephrology following patient  Lab Results   Component Value Date    CREATININE 2.6 (H) 06/21/2025       5. Metastatic Stage IV right lower lobe adenocarcinoma with malignant pleural effusion and brain mets      Recommendations:    Agree with oral amiodarone to maintain sinus rhythm  IV amiodarone can be restarted if needed-in case of recurrent episodes of paroxysmal A-fib 
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 malignant pleural effusion and brain mets  4/3 bronchoscopy lymph node positive  Status post palliative radiation 04/11/2025  For outpatient treatment with immunotherapy  Chronic pain on fentanyl  Anxiety on as needed Versed  Elevated troponins, new right bundle branch block  A-fib RVR on amiodarone  post extubation dysphonia seen by ENT on steroids  3/27 flexible laryngoscopy right vocal fold immobility and incomplete glottic closure   Very severe obstructive lung disease/COPD emphysema with FEV1 17% predicted    Continue Grzegorz Fernándze  Completed prednisone on 6/11   Bilateral DVT   3/28 Right soleal DVT, left posterior tibial DVT and left soleal DVT  On heparin subq   Anemia, stable  S/p EGD 4/5 gastric ulcer treated with cautery and clips  Was to have repeat EGD in 8 weeks to check for healing  On pantoprazole  Pulmonary hypertension           RVSP 61mm Hg  History of atrial fibrillation  4/3 ECHO EF 55%, RV normal, there is evidence of intrapulmonary shunting as detected by agitated saline contrast after 6 beats  4/9 New onset A-fib, self-converted following 1x amio bolus and lytes repletion  History of depression on trazodone, hypertension, angioedema with ceftriaxone, 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 Very poor long term prognosis    Being diuresed  To go to [place] [facility]  Continue with trials as tolerated  Discussed PEG tube patient does not want it at this time  DC ipratropium/albuterol changed to  albuterol  Off of IV amiodarone  Being diuresed    Electronically signed by Myesha Howell MD on 6/22/2025 at 9:29 AM      
16  SpO2: 99 %  Humidification Source: Heated wire  Humidification Temp: 31  Circuit Condensation: Drained  .    Imaging Studies:    CXR portable: Results for orders placed during the hospital encounter of 06/15/25    XR CHEST PORTABLE    Narrative  EXAMINATION:  ONE XRAY VIEW OF THE CHEST    6/15/2025 2:12 am    COMPARISON:  03/07/2025    HISTORY:  ORDERING SYSTEM PROVIDED HISTORY: SOB  TECHNOLOGIST PROVIDED HISTORY:  Reason for exam:->SOB    FINDINGS:  Cardiomediastinal silhouette is stable.  There has been interval worsening of  right basilar interstitial and airspace opacities with increased right  pleural effusion.  The left lung is clear.  No pneumothorax.    Impression  Interval worsening of right basilar opacities with increased right pleural  effusion.      CXR PA/LAT : Results for orders placed during the hospital encounter of 03/07/25    XR CHEST (2 VW)    Narrative  EXAMINATION:  TWO XRAY VIEWS OF THE CHEST    3/7/2025 9:27 am    COMPARISON:  01/31/2025    HISTORY:  ORDERING SYSTEM PROVIDED HISTORY: Obstructive chronic bronchitis with  exacerbation (HCC)  TECHNOLOGIST PROVIDED HISTORY:  Was in the hospital a couple weeks ago. Hard time breathing on O2 all the  time.  Former smoker    FINDINGS:  There is significant hyperinflation lungs.  There is progressive fibrosis and  some infiltrative changes in the right lower hemithorax.  There is a small  right effusion.  There is prominence of the right hilum which is not  significantly changed but could be related to either the pulmonary artery or  some adenopathy.    Impression  1. Progressive fibrosis and some infiltrative changes in the right lower  hemithorax.  2. Small right pleural effusion.  3. Prominence of the right hilum which is not significantly changed but could  be related to either the pulmonary artery or some adenopathy.  4. Follow-up is recommended      CT Chest w/o contrast: No results found for this or any previous visit.      CT Chest w/

## 2025-06-24 NOTE — DISCHARGE SUMMARY
Hospital Medicine Discharge Summary    Patient ID: Frank Niño      Patient's PCP: No primary care provider on file.    Admit Date: 6/15/2025     Discharge Date:   06/25/2025    Admitting Physician: No admitting provider for patient encounter.     Discharge Physician: Scooter Giang MD     Discharge Diagnoses:  Septic shock  Afib with RVR  Acute on Chronic Hypoxic respiratory failure 2/2 Pneumonia and near complete collapse of right hemithorax     Active Hospital Problems    Diagnosis Date Noted    Palliative care encounter [Z51.5] 06/20/2025    Severe protein-calorie malnutrition [E43] 06/19/2025    Paroxysmal atrial fibrillation (HCC) [I48.0] 06/18/2025    Pleural effusion on right [J90] 06/18/2025    Septic shock (HCC) [A41.9, R65.21] 06/15/2025    Pneumonia of right lung due to infectious organism [J18.9] 06/15/2025       The patient was seen and examined on day of discharge and this discharge summary is in conjunction with any daily progress note from day of discharge.    Hospital Course:   Patient is a 69 year old male with hx of adenocacinoma of the lung chronic respiratory failure, previously trache dependent but weaned to nasal cannula. He came from a SNF for shortness of breath and hypoxia, was found to have a right lower lobe pneumonia, right pleural effusion and  septic shock.      Patient was started on pressors, broad-spectrum antibiotics.  Infectious diseases was consulted.  He also had DARREN for which nephrology was consulted Patient was treated in MICU.    Pt was intubated on 6/17   S/p bronchoscopy 6/17 6/18-pt developed a fib; back to nsr ; EP consulted; started on amiodarone drip; levophed switched to aarti    6/19-pressor weaned off; still on amio drip for a fib rvr    6/20-off sedation; pt awake; undergoing sbt    6/21-pt awake and interactive ; amio drip transistioned to po; still heart rate is in 110s;     6/22- leukocytosis -uptrending 26 K  us abdomen no evidence of

## 2025-06-24 NOTE — CARE COORDINATION
CM Update: Discharge Order Noted: Met with Ed (brother) at bedside to discuss transition of care plan. Discharge plan is [facility]. Transport set with [facility] for 2000. Nursing Notified, Kathi and Ed Notified, Facility Notified. Note- [facility] will not have the room ready prior to 2000. CM/SW to follow. Torito Bhat [phone]

## 2025-06-25 PROCEDURE — 99239 HOSP IP/OBS DSCHRG MGMT >30: CPT | Performed by: STUDENT IN AN ORGANIZED HEALTH CARE EDUCATION/TRAINING PROGRAM

## 2025-06-25 NOTE — PLAN OF CARE
Problem: Discharge Planning  Goal: Discharge to home or other facility with appropriate resources  6/17/2025 1857 by Francisca Quick RN  Outcome: Progressing  Flowsheets (Taken 6/16/2025 1934)  Discharge to home or other facility with appropriate resources:   Identify barriers to discharge with patient and caregiver   Arrange for needed discharge resources and transportation as appropriate   Identify discharge learning needs (meds, wound care, etc)  6/17/2025 0947 by Namita Peck RN  Outcome: Progressing     Problem: Pain  Goal: Verbalizes/displays adequate comfort level or baseline comfort level  6/17/2025 1857 by Francisca Quick RN  Outcome: Progressing  Flowsheets (Taken 6/16/2025 1934)  Verbalizes/displays adequate comfort level or baseline comfort level:   Encourage patient to monitor pain and request assistance   Assess pain using appropriate pain scale   Administer analgesics based on type and severity of pain and evaluate response   Implement non-pharmacological measures as appropriate and evaluate response  6/17/2025 0947 by Namita Peck RN  Outcome: Progressing     Problem: Skin/Tissue Integrity  Goal: Skin integrity remains intact  Description: 1.  Monitor for areas of redness and/or skin breakdown  2.  Assess vascular access sites hourly  3.  Every 4-6 hours minimum:  Change oxygen saturation probe site  4.  Every 4-6 hours:  If on nasal continuous positive airway pressure, respiratory therapy assess nares and determine need for appliance change or resting period  6/17/2025 1857 by Francisca Quick RN  Outcome: Progressing  Flowsheets (Taken 6/15/2025 2105 by Zena Miranda, RN)  Skin Integrity Remains Intact:   Monitor for areas of redness and/or skin breakdown   Assess vascular access sites hourly   Every 4-6 hours minimum:  Change oxygen saturation probe site  6/17/2025 0947 by Namita Peck RN  Outcome: Progressing     Problem: Safety - Adult  Goal: Free from fall injury  6/17/2025 
  Problem: Discharge Planning  Goal: Discharge to home or other facility with appropriate resources  6/20/2025 1033 by Argelia Raymundo RN  Outcome: Progressing  Flowsheets (Taken 6/20/2025 0800)  Discharge to home or other facility with appropriate resources: Identify barriers to discharge with patient and caregiver     Problem: Pain  Goal: Verbalizes/displays adequate comfort level or baseline comfort level  6/20/2025 1033 by Argelia Raymundo RN  Outcome: Progressing     Problem: Skin/Tissue Integrity  Goal: Skin integrity remains intact  Description: 1.  Monitor for areas of redness and/or skin breakdown  2.  Assess vascular access sites hourly  3.  Every 4-6 hours minimum:  Change oxygen saturation probe site  4.  Every 4-6 hours:  If on nasal continuous positive airway pressure, respiratory therapy assess nares and determine need for appliance change or resting period  6/20/2025 1033 by Argelia Raymundo RN  Outcome: Progressing  Flowsheets (Taken 6/20/2025 0800)  Skin Integrity Remains Intact:   Monitor for areas of redness and/or skin breakdown   Assess vascular access sites hourly   Every 4-6 hours minimum:  Change oxygen saturation probe site     Problem: Safety - Adult  Goal: Free from fall injury  6/20/2025 1033 by Argelia Raymundo RN  Outcome: Progressing     Problem: ABCDS Injury Assessment  Goal: Absence of physical injury  6/20/2025 1033 by Argelia Raymundo RN  Outcome: Progressing     Problem: Respiratory - Adult  Goal: Achieves optimal ventilation and oxygenation  6/20/2025 1033 by Argelia Raymundo RN  Outcome: Progressing  Flowsheets (Taken 6/20/2025 0800)  Achieves optimal ventilation and oxygenation: Assess for changes in respiratory status     
  Problem: Discharge Planning  Goal: Discharge to home or other facility with appropriate resources  6/21/2025 1052 by Adin Sanches RN  Outcome: Progressing  Flowsheets (Taken 6/21/2025 0800)  Discharge to home or other facility with appropriate resources:   Identify barriers to discharge with patient and caregiver   Arrange for needed discharge resources and transportation as appropriate   Identify discharge learning needs (meds, wound care, etc)   Arrange for interpreters to assist at discharge as needed   Refer to discharge planning if patient needs post-hospital services based on physician order or complex needs related to functional status, cognitive ability or social support system     Problem: Pain  Goal: Verbalizes/displays adequate comfort level or baseline comfort level  6/21/2025 1052 by Adin Sanches RN  Outcome: Progressing  Flowsheets (Taken 6/21/2025 0800)  Verbalizes/displays adequate comfort level or baseline comfort level:   Encourage patient to monitor pain and request assistance   Assess pain using appropriate pain scale   Administer analgesics based on type and severity of pain and evaluate response   Implement non-pharmacological measures as appropriate and evaluate response   Consider cultural and social influences on pain and pain management     Problem: Skin/Tissue Integrity  Goal: Skin integrity remains intact  Description: 1.  Monitor for areas of redness and/or skin breakdown  2.  Assess vascular access sites hourly  3.  Every 4-6 hours minimum:  Change oxygen saturation probe site  4.  Every 4-6 hours:  If on nasal continuous positive airway pressure, respiratory therapy assess nares and determine need for appliance change or resting period  6/21/2025 1052 by Adin Sanches RN  Outcome: Progressing  Flowsheets (Taken 6/21/2025 0800)  Skin Integrity Remains Intact:   Monitor for areas of redness and/or skin breakdown   Assess vascular access sites hourly   Every 4-6 
  Problem: Discharge Planning  Goal: Discharge to home or other facility with appropriate resources  Outcome: Progressing     Problem: Pain  Goal: Verbalizes/displays adequate comfort level or baseline comfort level  6/16/2025 0945 by Namita Peck RN  Outcome: Progressing  6/15/2025 2107 by Zena Miranda RN  Outcome: Progressing     Problem: Skin/Tissue Integrity  Goal: Skin integrity remains intact  Description: 1.  Monitor for areas of redness and/or skin breakdown  2.  Assess vascular access sites hourly  3.  Every 4-6 hours minimum:  Change oxygen saturation probe site  4.  Every 4-6 hours:  If on nasal continuous positive airway pressure, respiratory therapy assess nares and determine need for appliance change or resting period  6/16/2025 0945 by Namita Peck RN  Outcome: Progressing  6/15/2025 2107 by Zena Miranda RN  Outcome: Progressing  Flowsheets (Taken 6/15/2025 2105)  Skin Integrity Remains Intact:   Monitor for areas of redness and/or skin breakdown   Assess vascular access sites hourly   Every 4-6 hours minimum:  Change oxygen saturation probe site     Problem: Safety - Adult  Goal: Free from fall injury  6/16/2025 0945 by Namita Peck RN  Outcome: Progressing  6/15/2025 2107 by Zena Miranda RN  Outcome: Progressing     Problem: ABCDS Injury Assessment  Goal: Absence of physical injury  6/16/2025 0945 by Namita Peck RN  Outcome: Progressing  6/15/2025 2107 by Zena Miranda RN  Outcome: Progressing  Flowsheets (Taken 6/15/2025 2105)  Absence of Physical Injury: Implement safety measures based on patient assessment     Problem: Respiratory - Adult  Goal: Achieves optimal ventilation and oxygenation  6/16/2025 0945 by Namita Peck RN  Outcome: Progressing  6/15/2025 2108 by Zena Miranda RN  Outcome: Progressing     Problem: Cardiovascular - Adult  Goal: Maintains optimal cardiac output and hemodynamic stability  6/16/2025 0945 by Namita Peck RN  Outcome: 
  Problem: Discharge Planning  Goal: Discharge to home or other facility with appropriate resources  Outcome: Progressing     Problem: Pain  Goal: Verbalizes/displays adequate comfort level or baseline comfort level  Outcome: Progressing     Problem: Skin/Tissue Integrity  Goal: Skin integrity remains intact  Description: 1.  Monitor for areas of redness and/or skin breakdown  2.  Assess vascular access sites hourly  3.  Every 4-6 hours minimum:  Change oxygen saturation probe site  4.  Every 4-6 hours:  If on nasal continuous positive airway pressure, respiratory therapy assess nares and determine need for appliance change or resting period  Outcome: Progressing     Problem: Safety - Adult  Goal: Free from fall injury  Outcome: Progressing     Problem: ABCDS Injury Assessment  Goal: Absence of physical injury  Outcome: Progressing     Problem: Respiratory - Adult  Goal: Achieves optimal ventilation and oxygenation  Outcome: Progressing     Problem: Cardiovascular - Adult  Goal: Maintains optimal cardiac output and hemodynamic stability  Outcome: Progressing  Goal: Absence of cardiac dysrhythmias or at baseline  Outcome: Progressing     Problem: Genitourinary - Adult  Goal: Absence of urinary retention  Outcome: Progressing     Problem: Infection - Adult  Goal: Absence of infection at discharge  Outcome: Progressing     Problem: Metabolic/Fluid and Electrolytes - Adult  Goal: Electrolytes maintained within normal limits  Outcome: Progressing  Goal: Glucose maintained within prescribed range  Outcome: Progressing     Problem: Hematologic - Adult  Goal: Maintains hematologic stability  Outcome: Progressing     Problem: Infection - Adult  Goal: Absence of infection during hospitalization  Outcome: Not Progressing     Problem: Metabolic/Fluid and Electrolytes - Adult  Goal: Hemodynamic stability and optimal renal function maintained  Outcome: Not Progressing     
  Problem: Discharge Planning  Goal: Discharge to home or other facility with appropriate resources  Outcome: Progressing     Problem: Pain  Goal: Verbalizes/displays adequate comfort level or baseline comfort level  Outcome: Progressing     Problem: Skin/Tissue Integrity  Goal: Skin integrity remains intact  Outcome: Progressing     Problem: Safety - Adult  Goal: Free from fall injury  Outcome: Progressing     Problem: ABCDS Injury Assessment  Goal: Absence of physical injury  Outcome: Progressing     Problem: Respiratory - Adult  Goal: Achieves optimal ventilation and oxygenation  Outcome: Progressing     Problem: Cardiovascular - Adult  Goal: Maintains optimal cardiac output and hemodynamic stability  Outcome: Progressing  Goal: Absence of cardiac dysrhythmias or at baseline  Outcome: Progressing     Problem: Genitourinary - Adult  Goal: Absence of urinary retention  Outcome: Progressing     Problem: Infection - Adult  Goal: Absence of infection at discharge  Outcome: Progressing  Goal: Absence of infection during hospitalization  Outcome: Progressing     Problem: Metabolic/Fluid and Electrolytes - Adult  Goal: Electrolytes maintained within normal limits  Outcome: Progressing  Goal: Hemodynamic stability and optimal renal function maintained  Outcome: Progressing  Goal: Glucose maintained within prescribed range  Outcome: Progressing     Problem: Hematologic - Adult  Goal: Maintains hematologic stability  Outcome: Progressing     Problem: Neurosensory - Adult  Goal: Achieves maximal functionality and self care  Outcome: Progressing     Problem: Skin/Tissue Integrity - Adult  Goal: Skin integrity remains intact  Outcome: Progressing     Problem: Gastrointestinal - Adult  Goal: Maintains adequate nutritional intake  Outcome: Progressing     
  Problem: Discharge Planning  Goal: Discharge to home or other facility with appropriate resources  Outcome: Progressing  Flowsheets (Taken 6/19/2025 2000)  Discharge to home or other facility with appropriate resources:   Identify barriers to discharge with patient and caregiver   Arrange for needed discharge resources and transportation as appropriate     Problem: Pain  Goal: Verbalizes/displays adequate comfort level or baseline comfort level  Outcome: Progressing  Flowsheets (Taken 6/19/2025 2000)  Verbalizes/displays adequate comfort level or baseline comfort level:   Encourage patient to monitor pain and request assistance   Assess pain using appropriate pain scale   Administer analgesics based on type and severity of pain and evaluate response   Implement non-pharmacological measures as appropriate and evaluate response     Problem: Skin/Tissue Integrity  Goal: Skin integrity remains intact  Description: 1.  Monitor for areas of redness and/or skin breakdown  2.  Assess vascular access sites hourly  3.  Every 4-6 hours minimum:  Change oxygen saturation probe site  4.  Every 4-6 hours:  If on nasal continuous positive airway pressure, respiratory therapy assess nares and determine need for appliance change or resting period  Outcome: Progressing  Flowsheets (Taken 6/19/2025 2000)  Skin Integrity Remains Intact:   Assess vascular access sites hourly   Monitor for areas of redness and/or skin breakdown   Every 4-6 hours minimum:  Change oxygen saturation probe site   Every 4-6 hours:  If on nasal continuous positive airway pressure, assess nares and determine need for appliance change or resting period   Turn and reposition as indicated   Assess need for specialty bed   Check visual cues for pain   Monitor skin under medical devices   Pressure redistribution bed/mattress (bed type)   Positioning devices     Problem: Safety - Adult  Goal: Free from fall injury  Outcome: Progressing  Flowsheets (Taken 6/19/2025 
  Problem: Discharge Planning  Goal: Discharge to home or other facility with appropriate resources  Outcome: Progressing  Flowsheets (Taken 6/21/2025 2000)  Discharge to home or other facility with appropriate resources: Identify barriers to discharge with patient and caregiver     Problem: Pain  Goal: Verbalizes/displays adequate comfort level or baseline comfort level  Outcome: Progressing  Flowsheets (Taken 6/21/2025 2000)  Verbalizes/displays adequate comfort level or baseline comfort level:   Encourage patient to monitor pain and request assistance   Assess pain using appropriate pain scale   Administer analgesics based on type and severity of pain and evaluate response   Implement non-pharmacological measures as appropriate and evaluate response     Problem: Skin/Tissue Integrity  Goal: Skin integrity remains intact  Description: 1.  Monitor for areas of redness and/or skin breakdown  2.  Assess vascular access sites hourly  3.  Every 4-6 hours minimum:  Change oxygen saturation probe site  4.  Every 4-6 hours:  If on nasal continuous positive airway pressure, respiratory therapy assess nares and determine need for appliance change or resting period  Outcome: Progressing  Flowsheets (Taken 6/21/2025 2000)  Skin Integrity Remains Intact:   Monitor for areas of redness and/or skin breakdown   Assess vascular access sites hourly   Every 4-6 hours minimum:  Change oxygen saturation probe site   Every 4-6 hours:  If on nasal continuous positive airway pressure, assess nares and determine need for appliance change or resting period   Turn and reposition as indicated   Assess need for specialty bed   Positioning devices   Check visual cues for pain   Monitor skin under medical devices   Pressure redistribution bed/mattress (bed type)     Problem: Safety - Adult  Goal: Free from fall injury  Outcome: Progressing  Flowsheets (Taken 6/21/2025 2000)  Free From Fall Injury:   Instruct family/caregiver on patient safety   
  Problem: Discharge Planning  Goal: Discharge to home or other facility with appropriate resources  Outcome: Progressing  Flowsheets (Taken 6/24/2025 2000)  Discharge to home or other facility with appropriate resources:   Identify barriers to discharge with patient and caregiver   Arrange for needed discharge resources and transportation as appropriate   Identify discharge learning needs (meds, wound care, etc)   Refer to discharge planning if patient needs post-hospital services based on physician order or complex needs related to functional status, cognitive ability or social support system     Problem: Pain  Goal: Verbalizes/displays adequate comfort level or baseline comfort level  Outcome: Progressing  Flowsheets (Taken 6/24/2025 2000)  Verbalizes/displays adequate comfort level or baseline comfort level:   Encourage patient to monitor pain and request assistance   Assess pain using appropriate pain scale   Administer analgesics based on type and severity of pain and evaluate response   Consider cultural and social influences on pain and pain management   Implement non-pharmacological measures as appropriate and evaluate response     Problem: Skin/Tissue Integrity  Goal: Skin integrity remains intact  Description: 1.  Monitor for areas of redness and/or skin breakdown  2.  Assess vascular access sites hourly  3.  Every 4-6 hours minimum:  Change oxygen saturation probe site  4.  Every 4-6 hours:  If on nasal continuous positive airway pressure, respiratory therapy assess nares and determine need for appliance change or resting period  Outcome: Progressing  Flowsheets (Taken 6/24/2025 2000)  Skin Integrity Remains Intact:   Monitor for areas of redness and/or skin breakdown   Assess vascular access sites hourly     Problem: Musculoskeletal - Adult  Goal: Return mobility to safest level of function  Outcome: Not Progressing  Flowsheets (Taken 6/24/2025 2000)  Return Mobility to Safest Level of Function:   
  Problem: Infection - Adult  Goal: Absence of infection during hospitalization  6/16/2025 0945 by Namita Peck RN  Outcome: Not Progressing     Problem: Metabolic/Fluid and Electrolytes - Adult  Goal: Hemodynamic stability and optimal renal function maintained  6/16/2025 1934 by Francisca Quick RN  Flowsheets (Taken 6/16/2025 1934)  Hemodynamic stability and optimal renal function maintained:   Monitor labs and assess for signs and symptoms of volume excess or deficit   Monitor intake, output and patient weight   Monitor urine specific gravity, serum osmolarity and serum sodium as indicated or ordered  6/16/2025 0945 by Namita Peck RN  Outcome: Not Progressing     Problem: Infection - Adult  Goal: Absence of infection during hospitalization  6/16/2025 0945 by Namita Peck RN  Outcome: Not Progressing     Problem: Metabolic/Fluid and Electrolytes - Adult  Goal: Hemodynamic stability and optimal renal function maintained  6/16/2025 1934 by Francisca Quick RN  Flowsheets (Taken 6/16/2025 1934)  Hemodynamic stability and optimal renal function maintained:   Monitor labs and assess for signs and symptoms of volume excess or deficit   Monitor intake, output and patient weight   Monitor urine specific gravity, serum osmolarity and serum sodium as indicated or ordered  6/16/2025 0945 by Namita Peck RN  Outcome: Not Progressing     
  Problem: Pain  Goal: Verbalizes/displays adequate comfort level or baseline comfort level  6/24/2025 0837 by Tripp Saavedra RN  Outcome: Progressing     Problem: Safety - Adult  Goal: Free from fall injury  6/24/2025 0837 by Tripp Saavedra RN  Outcome: Progressing     Problem: ABCDS Injury Assessment  Goal: Absence of physical injury  6/24/2025 0837 by Tripp Saavedra RN  Outcome: Progressing     Problem: Respiratory - Adult  Goal: Achieves optimal ventilation and oxygenation  6/24/2025 0837 by Tripp Saavedra RN  Outcome: Progressing     Problem: Neurosensory - Adult  Goal: Absence of seizures  6/24/2025 0837 by Tripp Saavedra RN  Outcome: Progressing     Problem: Nutrition Deficit:  Goal: Optimize nutritional status  6/24/2025 0837 by Tripp Saavedra RN  Outcome: Progressing       
  Problem: Pain  Goal: Verbalizes/displays adequate comfort level or baseline comfort level  Outcome: Progressing     Problem: Skin/Tissue Integrity  Goal: Skin integrity remains intact  Description: 1.  Monitor for areas of redness and/or skin breakdown  2.  Assess vascular access sites hourly  3.  Every 4-6 hours minimum:  Change oxygen saturation probe site  4.  Every 4-6 hours:  If on nasal continuous positive airway pressure, respiratory therapy assess nares and determine need for appliance change or resting period  Outcome: Progressing  Flowsheets (Taken 6/15/2025 2105)  Skin Integrity Remains Intact:   Monitor for areas of redness and/or skin breakdown   Assess vascular access sites hourly   Every 4-6 hours minimum:  Change oxygen saturation probe site     Problem: Safety - Adult  Goal: Free from fall injury  Outcome: Progressing     Problem: ABCDS Injury Assessment  Goal: Absence of physical injury  Outcome: Progressing  Flowsheets (Taken 6/15/2025 2105)  Absence of Physical Injury: Implement safety measures based on patient assessment     Problem: Respiratory - Adult  Goal: Achieves optimal ventilation and oxygenation  Outcome: Progressing     Problem: Cardiovascular - Adult  Goal: Maintains optimal cardiac output and hemodynamic stability  Outcome: Progressing     Problem: Cardiovascular - Adult  Goal: Absence of cardiac dysrhythmias or at baseline  Outcome: Progressing     Problem: Genitourinary - Adult  Goal: Absence of urinary retention  Outcome: Progressing     Problem: Genitourinary - Adult  Goal: Urinary catheter remains patent  Outcome: Progressing     Problem: Infection - Adult  Goal: Absence of infection during hospitalization  Outcome: Progressing     Problem: Metabolic/Fluid and Electrolytes - Adult  Goal: Electrolytes maintained within normal limits  Outcome: Progressing     Problem: Metabolic/Fluid and Electrolytes - Adult  Goal: Hemodynamic stability and optimal renal function 
  Problem: Respiratory - Adult  Goal: Achieves optimal ventilation and oxygenation  6/17/2025 1725 by Simerlink, Patricia, RCP  Outcome: Progressing     Problem: Infection - Adult  Goal: Absence of infection during hospitalization  6/17/2025 0947 by Namita Peck, RN  Outcome: Not Progressing     Problem: Metabolic/Fluid and Electrolytes - Adult  Goal: Hemodynamic stability and optimal renal function maintained  6/17/2025 0947 by Namita Peck, RN  Outcome: Not Progressing     
  Problem: Respiratory - Adult  Goal: Achieves optimal ventilation and oxygenation  6/19/2025 1016 by Gloria Mcclendon RCP  Outcome: Progressing     
  Problem: Respiratory - Adult  Goal: Achieves optimal ventilation and oxygenation  Outcome: Progressing  Flowsheets  Taken 6/20/2025 0019 by Nikki Ding RCP  Achieves optimal ventilation and oxygenation:   Assess for changes in respiratory status   Assess for changes in mentation and behavior   Position to facilitate oxygenation and minimize respiratory effort   Oxygen supplementation based on oxygen saturation or arterial blood gases   Assess the need for suctioning and aspirate as needed   Respiratory therapy support as indicated     
  Problem: Skin/Tissue Integrity  Goal: Skin integrity remains intact  Description: 1.  Monitor for areas of redness and/or skin breakdown  2.  Assess vascular access sites hourly  3.  Every 4-6 hours minimum:  Change oxygen saturation probe site  4.  Every 4-6 hours:  If on nasal continuous positive airway pressure, respiratory therapy assess nares and determine need for appliance change or resting period  6/23/2025 2028 by Savi Hooper RN  Outcome: Progressing  Flowsheets (Taken 6/23/2025 2000)  Skin Integrity Remains Intact:   Monitor for areas of redness and/or skin breakdown   Assess vascular access sites hourly  6/23/2025 0911 by Sylvester Kidd RN  Outcome: Not Progressing  Flowsheets  Taken 6/23/2025 0000 by Lizzy Montaño RN  Skin Integrity Remains Intact: Monitor for areas of redness and/or skin breakdown  Taken 6/22/2025 2000 by Oanh Seymour RN  Skin Integrity Remains Intact:   Monitor for areas of redness and/or skin breakdown   Assess vascular access sites hourly   Every 4-6 hours minimum:  Change oxygen saturation probe site   Every 4-6 hours:  If on nasal continuous positive airway pressure, assess nares and determine need for appliance change or resting period   Turn and reposition as indicated   Assess need for specialty bed   Positioning devices   Pressure redistribution bed/mattress (bed type)   Check visual cues for pain   Monitor skin under medical devices     Problem: Infection - Adult  Goal: Absence of infection at discharge  6/23/2025 2028 by Savi Hooper RN  Outcome: Progressing  Flowsheets (Taken 6/23/2025 2000)  Absence of infection at discharge:   Assess and monitor for signs and symptoms of infection   Monitor lab/diagnostic results   Monitor all insertion sites i.e., indwelling lines, tubes and drains   [place] appropriate cooling/warming therapies per order   Administer medications as ordered  6/23/2025 0911 by Sylvester Kidd RN  Outcome: Not 
Non-billable rounding,     Patient is a 69 year old male with hx of adenocacinoma of the lung chronic respiratory failure, previously trache dependent but weaned to nasal cannula. He came from a SNF for shortness of breath and hypoxia, was found to have a right lower lobe pneumonia, right pleural effusion and in septic shock. Admitted by my colleague overnight. Patient awaiting bed in the ICU. BIPAP trialed to be off this morning due to patient intolerance, but desaturated. Currently on airvo. At beside, no complaints apart from dry nose, feeling comfortable on the airvo, however saturating 88-90%.     Plan   Septic shock - on levophed, add vaso, add hydrocortisone 100 mg q8, follow cultures, check pro mone, CRP, infectious disease recommendations appreciated. On Vancomycin and Zosyn, awaiting ICU bed.  Acute on Chronic hypoxic respiratory failure 2/2 to pneumonia and pleural effusion- previously trache dependent and weaned to nasal cannula. Currently on airvo, if hypoxic may need trache to vent, pulmunology consulted.   Hyponatremia, DARREN - creatinine baseline 1.0, currently 5.6, follow urine studies, nephrology on board, will follow recommendations.     
and family to use good hand hygiene technique     Problem: Metabolic/Fluid and Electrolytes - Adult  Goal: Electrolytes maintained within normal limits  Outcome: Progressing  Flowsheets  Taken 6/23/2025 0000 by Lizzy Montaño RN  Electrolytes maintained within normal limits: Monitor labs and assess patient for signs and symptoms of electrolyte imbalances  Taken 6/22/2025 2000 by Oanh Seymour RN  Electrolytes maintained within normal limits:   Monitor labs and assess patient for signs and symptoms of electrolyte imbalances   Administer electrolyte replacement as ordered   Monitor response to electrolyte replacements, including repeat lab results as appropriate  Goal: Hemodynamic stability and optimal renal function maintained  Outcome: Progressing  Flowsheets (Taken 6/23/2025 0000 by Lizzy Montaño RN)  Hemodynamic stability and optimal renal function maintained: Monitor labs and assess for signs and symptoms of volume excess or deficit  Goal: Glucose maintained within prescribed range  Outcome: Progressing  Flowsheets (Taken 6/23/2025 0000 by Lizzy Montaño RN)  Glucose maintained within prescribed range: Monitor blood glucose as ordered     Problem: Hematologic - Adult  Goal: Maintains hematologic stability  Outcome: Progressing  Flowsheets  Taken 6/23/2025 0000 by Lizzy Montaño RN  Maintains hematologic stability: Assess for signs and symptoms of bleeding or hemorrhage  Taken 6/22/2025 2000 by Oanh Seymour RN  Maintains hematologic stability:   Monitor labs for bleeding or clotting disorders   Assess for signs and symptoms of bleeding or hemorrhage   Administer blood products/factors as ordered     Problem: Neurosensory - Adult  Goal: Achieves maximal functionality and self care  Outcome: Progressing  Flowsheets (Taken 6/22/2025 2000 by Oanh Seymour RN)  Achieves maximal functionality and self care: Monitor swallowing and airway patency with patient fatigue and changes in neurological 
specific gravity, serum osmolarity and serum sodium as indicated or ordered   Encourage oral intake as appropriate   Monitor response to interventions for patient's volume status, including labs, urine output, blood pressure (other measures as available)   Instruct patient on fluid and nutrition restrictions as appropriate  6/17/2025 1857 by Francisca Quick, RN  Outcome: Progressing  Flowsheets (Taken 6/16/2025 1934)  Hemodynamic stability and optimal renal function maintained:   Monitor labs and assess for signs and symptoms of volume excess or deficit   Monitor intake, output and patient weight   Monitor urine specific gravity, serum osmolarity and serum sodium as indicated or ordered  Goal: Glucose maintained within prescribed range  6/18/2025 0500 by Francisca Quick, RN  Outcome: Progressing  Flowsheets (Taken 6/18/2025 0400)  Glucose maintained within prescribed range:   Monitor blood glucose as ordered   Assess for signs and symptoms of hyperglycemia and hypoglycemia   Administer ordered medications to maintain glucose within target range   Assess barriers to adequate nutritional intake and initiate nutrition consult as needed   Instruct patient on self management of diabetes and initiate consult as needed  6/17/2025 1857 by Francisca Quick, RN  Outcome: Progressing  Flowsheets (Taken 6/16/2025 1934)  Glucose maintained within prescribed range:   Monitor blood glucose as ordered   Assess for signs and symptoms of hyperglycemia and hypoglycemia   Administer ordered medications to maintain glucose within target range   Assess barriers to adequate nutritional intake and initiate nutrition consult as needed   Instruct patient on self management of diabetes and initiate consult as needed     Problem: Hematologic - Adult  Goal: Maintains hematologic stability  6/18/2025 0500 by Francisca Quick, RN  Outcome: Progressing  Flowsheets (Taken 6/18/2025 0400)  Maintains hematologic stability:   Assess for signs and 
under medical devices    Problem: Gastrointestinal - Adult  Goal: Maintains adequate nutritional intake  Outcome: Progressing  Flowsheets (Taken 6/20/2025 2000)  Maintains adequate nutritional intake: Monitor intake and output, weight and lab values     
RN  Outcome: Progressing  6/22/2025 0128 by Oanh Seymour RN  Outcome: Progressing     Problem: Anxiety  Goal: Will report anxiety at manageable levels  Description: INTERVENTIONS:  1. Administer medication as ordered  2. Teach and rehearse alternative coping skills  3. Provide emotional support with 1:1 interaction with staff  Outcome: Progressing     Problem: Coping  Goal: Pt/Family able to verbalize concerns and demonstrate effective coping strategies  Description: INTERVENTIONS:  1. Assist patient/family to identify coping skills, available support systems and cultural and spiritual values  2. Provide emotional support, including active listening and acknowledgement of concerns of patient and caregivers  3. Reduce environmental stimuli, as able  4. Instruct patient/family in relaxation techniques, as appropriate  5. Assess for spiritual pain/suffering and initiate Spiritual Care, Psychosocial Clinical Specialist consults as needed  Outcome: Progressing     Problem: Spiritual Care  Goal: Pt/Family able to move forward in process of forgiving self, others, and/or higher power  Description: INTERVENTIONS:  1. Assist patient/family to overcome blocks to healing by use of spiritual practices (prayer, meditation, guided imagery, reiki, breath work, etc).  2. De-myth guilt and help patient/family identify possible irrational spiritual/cultural beliefs and values.  3. Explore possibilities of making amends & reconciliation with self, others, and/or a greater power.  4. Guide patient/family in identifying painful feelings of guilt.  5. Help patient/famiy explore and identify spiritual beliefs, cultural understandings or values that may help or hinder letting go of issue.  6. Help patient/family explore feelings of anger, bitterness, resentment.  7. Help patient/family identify and examine the situation in which these feelings are experienced.  8. Help patient/family identify destructive displacement of feelings

## 2025-06-26 LAB
MICROORGANISM SPEC CULT: ABNORMAL
MICROORGANISM/AGENT SPEC: ABNORMAL
SPECIMEN DESCRIPTION: ABNORMAL

## 2025-06-27 ENCOUNTER — HOSPITAL ENCOUNTER (OUTPATIENT)
Dept: CT IMAGING | Age: 70
Discharge: HOME | End: 2025-06-29

## 2025-06-27 PROCEDURE — 71250 CT THORAX DX C-: CPT

## 2025-06-28 LAB
MICROORGANISM SPEC CULT: NORMAL
MICROORGANISM SPEC CULT: NORMAL
SERVICE CMNT-IMP: NORMAL
SERVICE CMNT-IMP: NORMAL
SPECIMEN DESCRIPTION: NORMAL
SPECIMEN DESCRIPTION: NORMAL